# Patient Record
Sex: MALE | Race: WHITE | NOT HISPANIC OR LATINO | Employment: OTHER | URBAN - METROPOLITAN AREA
[De-identification: names, ages, dates, MRNs, and addresses within clinical notes are randomized per-mention and may not be internally consistent; named-entity substitution may affect disease eponyms.]

---

## 2022-09-19 ENCOUNTER — TELEPHONE (OUTPATIENT)
Dept: HEMATOLOGY/ONCOLOGY | Facility: CLINIC | Age: 62
End: 2022-09-19
Payer: COMMERCIAL

## 2022-10-12 ENCOUNTER — TELEPHONE (OUTPATIENT)
Dept: HEMATOLOGY/ONCOLOGY | Facility: CLINIC | Age: 62
End: 2022-10-12
Payer: COMMERCIAL

## 2022-10-12 NOTE — TELEPHONE ENCOUNTER
"----- Message from Toby Benítez sent at 10/12/2022 10:38 AM CDT -----  Scheduling Request        Patient Status: Np      Scheduling Appt: NFL, Injection (Velcaid)      Time/Date Preference: 10/19      Contact Preference?: [phone] (Mobile)      Treating Provider: Carmel      Do you feel you need to be seen today? No      Additional Notes:  "Thank you for all that you do for our patients"     "

## 2022-10-17 DIAGNOSIS — C90.01 MULTIPLE MYELOMA IN REMISSION: Primary | ICD-10-CM

## 2022-10-18 NOTE — PROGRESS NOTES
Chief Complaint : Multiple myeloma, hematology consultation      History of Present Illness : Vicente Connors is a 62 y.o. male here for hematology consultation. He has recently moved to [place] from [place]. He has history of stage IIIA, International Staging System stage II IgA kappa multiple myeloma, which is likely intermediate-risk based upon the presence of t(4;14) with hyperdiploidy, based on records available through care everywhere.    IgA Kappa multiple Myeloma was diagnosed in 2014. Started on Velcade, Revlimid and Dexamethasone at diagnosis. Of note, he developed a popliteal DVT and was started on Lovenox and later switched Xarelto. Completed a stem cell transplant with Dr Schuster in May 2015. He is s/p  tandem autologous transplant in 2015 and has been on triple maintenance with bortezomib, lenalidomide and dexamethasone since then.   He also has peripheral neuropathy, h/o LE DVT on chronic anti-coagulation, GERD, vertebral fracture. He has history of DVT and remains on chronic anticoagulation.  He has had multiple skeletal complications since his original diagnosis.    Medical history:    1. Multiple myeloma    2.  Vitamin B12 deficiency   3. Vertebral fracture   4. Peripheral neuropathy   5. Xerostomia   6. Posterior subcapsular polar age-related cataract, bilateral   7. Posterior vitreous detachment   8. Immunoglobulin deficiency   9. DVT, bilateral lower limbs  10. GERD without esophagitis     Surgereis:    1. Bone marrow biopsy  2. Colonoscopy      Social History     Socioeconomic History    Marital status:        No family history on file.      Review of patient's allergies indicates:  No Known Allergies        Current Outpatient Medications   Medication Sig    acyclovir (ZOVIRAX) 400 MG tablet Take 1 tablet by mouth once daily.    bortezomib (VELCADE INJ) Inject as directed. Pt gets every month    calcium carb/vit D3/minerals (CALCIUM-VITAMIN D ORAL)     dexAMETHasone (DECADRON) 4 MG Tab TAKE  10 TABLETS (40 MG DOSE) BY MOUTH DAY OF AND DAY AFTER VELCADE.    folic acid-vit B6-vit B12 2.5-25-2 mg (FOLBIC) 2.5-25-2 mg Tab Take 1 tablet by mouth once daily.    ibuprofen (ADVIL,MOTRIN) 600 MG tablet PRN    Lactobacillus acidophilus (PROBIOTIC ORAL) Take by mouth. 24 billion CFU    lenalidomide 10 mg Cap TAKE 1 CAPSULE DAILY FOR 21 DAYS, FOLLOWED BY 7 DAYS OFF  Indications: auth [id number]    magnesium 250 mg Tab     multivitamin with minerals tablet Take 1 tablet by mouth once daily.    omeprazole (PRILOSEC OTC) 20 MG tablet Take 1 tablet by mouth once daily.    rivaroxaban (XARELTO) 15 mg Tab Take 1 tablet by mouth once daily.    sulfamethoxazole-trimethoprim 800-160mg (BACTRIM DS) 800-160 mg Tab TAKE 1 TABLET EVERY MONDAY, WEDNESDAY AND FRIDAY    UNABLE TO FIND medication name: Benefiber Prebiotic    zoledronic acid (ZOMETA IV) Inject into the vein. Pt gets every 3 months     No current facility-administered medications for this visit.      Review of Systems   Constitutional:  Positive for malaise/fatigue. Negative for chills, fever and weight loss.   HENT:  Negative for congestion, ear pain and sinus pain.    Eyes:  Negative for double vision, photophobia and pain.   Respiratory:  Negative for sputum production and stridor.    Cardiovascular:  Negative for chest pain, palpitations, orthopnea and claudication.   Gastrointestinal:  Negative for blood in stool, constipation, diarrhea, melena and nausea.   Genitourinary:  Negative for dysuria, frequency and urgency.   Musculoskeletal:  Negative for myalgias and neck pain.   Neurological:  Positive for tingling. Negative for dizziness, tremors and speech change.   Endo/Heme/Allergies:  Negative for environmental allergies. Does not bruise/bleed easily.   Psychiatric/Behavioral:  Negative for substance abuse and suicidal ideas.           Vitals:    10/19/22 0937   BP: (!) 143/79   Pulse: 72   Resp: 16   Temp: 98 °F (36.7 °C)       PS: ECOG 1    Physical Exam  HENT:       Head: Normocephalic and atraumatic.      Mouth/Throat:      Pharynx: No posterior oropharyngeal erythema.   Eyes:      General: No scleral icterus.  Cardiovascular:      Rate and Rhythm: Normal rate and regular rhythm.   Pulmonary:      Effort: Pulmonary effort is normal. No respiratory distress.      Breath sounds: Normal breath sounds.   Abdominal:      General: There is no distension.      Palpations: There is no mass.      Tenderness: There is no abdominal tenderness.   Musculoskeletal:         General: No swelling.   Lymphadenopathy:      Cervical: No cervical adenopathy.   Neurological:      General: No focal deficit present.      Mental Status: He is alert and oriented to person, place, and time.      Cranial Nerves: No cranial nerve deficit.      1/9/2021 MRI Pelvis    IMPRESSION:   DIFFUSE PELVIC, PROXIMAL FEMORAL AND LOWER LUMBAR HETEROGENEOUS MARROW SIGNAL ABNORMALITY COULD RELATE TO HEMATOPOIETIC RED MARROW RECONVERSION. INFILTRATIVE MYELOMA CANNOT BE EXCLUDED. THERE IS A MORE DISCRETE 1.2 CM ENHANCING LESION IN THE LEFT FEMORAL HEAD THAT COULD ALSO REPRESENT AN ISLAND OF HEMATOPOIETIC RED MARROW. HOWEVER, CONTINUED ATTENTION ON FOLLOW-UP IS RECOMMENDED.    1/9/2021 MRI Lumbar spine/THoracic/Cervical  IMPRESSION:     1. There is interval increase in T2/STIR hyperintensity with enhancement extending from the left pedicle into the articular pillar and left transverse process of the T1 vertebra, although there is no definite decrease in the intrinsic T1 hypointense signal in this location. This may also be a degenerative process, although focal myeloma is not definitely excluded. Clinical correlation is recommended, and follow-up is suggested on future examinations.      2. There has been interval increase in STIR hyperintensity and enhancement along the posterior endplates at the T1-T2 level, most likely progressive degenerative endplate marrow signal changes.     3. No other focal suspicious STIR  hyperintense enhancing signal abnormalities are identified.     4. Stable multilevel compression fracture deformities      11/25/2020 Bone survey    IMPRESSION:   1. No lytic or blastic lesions are noted.      2. Stable compression fractures, as above.     3. Chronic changes, as above.       Assessment:    1. IgA kappa multiple myeloma in remission     -Vicente Connors is a 62 y.o.-year-old male with history of stage IIIA, International Staging System stage II IgA kappa multiple myeloma, which is likely intermediate-risk based upon the presence of t(4;14) with hyperdiploidy   -S/ p  tandem auto stem cell transplant in 2015 with a good biochemical remission  -Now on triple maintenance: velcade 1.3 mg/m2 sub q monthly, dex 40 mg PO day of and day after monthly velcade , revlimid 10 mg  PO for 21 days on/ 7 days off every 28 days  --Repeat 24 hour urine completed 4/2022  --He has been getting MRI spine every 2 years, next due Jan 2023    2. Low Back Pain: chronic and unchanged; not on any analgesic at this time    --Spine MRI done 1/21/21    3. History of LE DVT:     --Continue Xarelto   --Take with largest meal of the day  --Notify office of any scheduled procedure/surgery as Xarelto will need to be interrupted   -no bleeding diathesis reported    4. Infectious disease      --Continue prophylactic bactrim and acyclovir   --Tested for measles and ok no booster recommended  --Additional tdep recommended,  last in 2011  --completed Hep B series  --recommend annual flu vaccine, COVID 19 booster      5. Bone health:     --MRI spine1/21 neg   --Repeat 2023   --Last Zometa given 7/8/22, q3 months    6. Health Maintenance:     --Colonoscopy 2011 and 2017, repeat 2022   --PSA  up to date   --covid vaccine 3/31 pfizer booster done  --Got second booster 2/17/22          BMT Chart Routing      Follow up with physician . F/u before NEXT velcade treatmernt ( he can recieve velcad ein 1-2 weeks, when scheduled; next treatm\,ent  will be 4 weeks miko with clinic follow up on day of )   Follow up with SIGRID    Provider visit type    Infusion scheduling note zometa in 1-2 weeks ( when he comes for velcade) and then every 3 months   Injection scheduling note velcade once MONTHLY , to start in 1-2 weeks   Labs CBC, CMP, immunoglobulins, SPEP, beta 2 microglobulin, immunofixation and free light chains   Lab interval:  labs today; then cbc, cmp, spep, light chains on day of NEXT velcade treatment in 6 weeks   Imaging    Pharmacy appointment    Other referrals

## 2022-10-19 ENCOUNTER — LAB VISIT (OUTPATIENT)
Dept: LAB | Facility: HOSPITAL | Age: 62
End: 2022-10-19
Attending: INTERNAL MEDICINE
Payer: COMMERCIAL

## 2022-10-19 ENCOUNTER — OFFICE VISIT (OUTPATIENT)
Dept: HEMATOLOGY/ONCOLOGY | Facility: CLINIC | Age: 62
End: 2022-10-19
Payer: COMMERCIAL

## 2022-10-19 VITALS
TEMPERATURE: 98 F | SYSTOLIC BLOOD PRESSURE: 143 MMHG | HEART RATE: 72 BPM | DIASTOLIC BLOOD PRESSURE: 79 MMHG | WEIGHT: 175.63 LBS | RESPIRATION RATE: 16 BRPM | OXYGEN SATURATION: 98 % | HEIGHT: 69 IN | BODY MASS INDEX: 26.01 KG/M2

## 2022-10-19 DIAGNOSIS — G62.9 PERIPHERAL POLYNEUROPATHY: ICD-10-CM

## 2022-10-19 DIAGNOSIS — C90.01 MULTIPLE MYELOMA IN REMISSION: ICD-10-CM

## 2022-10-19 DIAGNOSIS — Z86.718 HISTORY OF DVT (DEEP VEIN THROMBOSIS): ICD-10-CM

## 2022-10-19 DIAGNOSIS — G89.3 CANCER ASSOCIATED PAIN: ICD-10-CM

## 2022-10-19 LAB
ALBUMIN SERPL BCP-MCNC: 3.5 G/DL (ref 3.5–5.2)
ALP SERPL-CCNC: 61 U/L (ref 55–135)
ALT SERPL W/O P-5'-P-CCNC: 24 U/L (ref 10–44)
ANION GAP SERPL CALC-SCNC: 5 MMOL/L (ref 8–16)
AST SERPL-CCNC: 25 U/L (ref 10–40)
B2 MICROGLOB SERPL-MCNC: 1.5 UG/ML (ref 0–2.5)
BASOPHILS # BLD AUTO: 0.03 K/UL (ref 0–0.2)
BASOPHILS NFR BLD: 1 % (ref 0–1.9)
BILIRUB SERPL-MCNC: 1.7 MG/DL (ref 0.1–1)
BUN SERPL-MCNC: 12 MG/DL (ref 8–23)
CALCIUM SERPL-MCNC: 9 MG/DL (ref 8.7–10.5)
CHLORIDE SERPL-SCNC: 104 MMOL/L (ref 95–110)
CO2 SERPL-SCNC: 27 MMOL/L (ref 23–29)
CREAT SERPL-MCNC: 0.8 MG/DL (ref 0.5–1.4)
DIFFERENTIAL METHOD: ABNORMAL
EOSINOPHIL # BLD AUTO: 0 K/UL (ref 0–0.5)
EOSINOPHIL NFR BLD: 0.3 % (ref 0–8)
ERYTHROCYTE [DISTWIDTH] IN BLOOD BY AUTOMATED COUNT: 15.4 % (ref 11.5–14.5)
EST. GFR  (NO RACE VARIABLE): >60 ML/MIN/1.73 M^2
GLUCOSE SERPL-MCNC: 91 MG/DL (ref 70–110)
HCT VFR BLD AUTO: 40.2 % (ref 40–54)
HGB BLD-MCNC: 13.4 G/DL (ref 14–18)
IGA SERPL-MCNC: 235 MG/DL (ref 40–350)
IGG SERPL-MCNC: 730 MG/DL (ref 650–1600)
IGM SERPL-MCNC: 16 MG/DL (ref 50–300)
IMM GRANULOCYTES # BLD AUTO: 0.01 K/UL (ref 0–0.04)
IMM GRANULOCYTES NFR BLD AUTO: 0.3 % (ref 0–0.5)
LYMPHOCYTES # BLD AUTO: 1.1 K/UL (ref 1–4.8)
LYMPHOCYTES NFR BLD: 35.6 % (ref 18–48)
MCH RBC QN AUTO: 31.9 PG (ref 27–31)
MCHC RBC AUTO-ENTMCNC: 33.3 G/DL (ref 32–36)
MCV RBC AUTO: 96 FL (ref 82–98)
MONOCYTES # BLD AUTO: 0.4 K/UL (ref 0.3–1)
MONOCYTES NFR BLD: 12.3 % (ref 4–15)
NEUTROPHILS # BLD AUTO: 1.6 K/UL (ref 1.8–7.7)
NEUTROPHILS NFR BLD: 50.5 % (ref 38–73)
NRBC BLD-RTO: 0 /100 WBC
PLATELET # BLD AUTO: 154 K/UL (ref 150–450)
PMV BLD AUTO: 9.8 FL (ref 9.2–12.9)
POTASSIUM SERPL-SCNC: 4.2 MMOL/L (ref 3.5–5.1)
PROT SERPL-MCNC: 6.4 G/DL (ref 6–8.4)
RBC # BLD AUTO: 4.2 M/UL (ref 4.6–6.2)
SODIUM SERPL-SCNC: 136 MMOL/L (ref 136–145)
WBC # BLD AUTO: 3.09 K/UL (ref 3.9–12.7)

## 2022-10-19 PROCEDURE — 80053 COMPREHEN METABOLIC PANEL: CPT | Performed by: INTERNAL MEDICINE

## 2022-10-19 PROCEDURE — 1159F MED LIST DOCD IN RCRD: CPT | Mod: CPTII,S$GLB,, | Performed by: INTERNAL MEDICINE

## 2022-10-19 PROCEDURE — 83520 IMMUNOASSAY QUANT NOS NONAB: CPT | Mod: 59 | Performed by: INTERNAL MEDICINE

## 2022-10-19 PROCEDURE — 3078F DIAST BP <80 MM HG: CPT | Mod: CPTII,S$GLB,, | Performed by: INTERNAL MEDICINE

## 2022-10-19 PROCEDURE — 99205 PR OFFICE/OUTPT VISIT, NEW, LEVL V, 60-74 MIN: ICD-10-PCS | Mod: S$GLB,,, | Performed by: INTERNAL MEDICINE

## 2022-10-19 PROCEDURE — 99999 PR PBB SHADOW E&M-EST. PATIENT-LVL IV: ICD-10-PCS | Mod: PBBFAC,,, | Performed by: INTERNAL MEDICINE

## 2022-10-19 PROCEDURE — 86334 IMMUNOFIX E-PHORESIS SERUM: CPT | Performed by: INTERNAL MEDICINE

## 2022-10-19 PROCEDURE — 85025 COMPLETE CBC W/AUTO DIFF WBC: CPT | Performed by: INTERNAL MEDICINE

## 2022-10-19 PROCEDURE — 99999 PR PBB SHADOW E&M-EST. PATIENT-LVL IV: CPT | Mod: PBBFAC,,, | Performed by: INTERNAL MEDICINE

## 2022-10-19 PROCEDURE — 82784 ASSAY IGA/IGD/IGG/IGM EACH: CPT | Performed by: INTERNAL MEDICINE

## 2022-10-19 PROCEDURE — 84165 PROTEIN E-PHORESIS SERUM: CPT | Performed by: INTERNAL MEDICINE

## 2022-10-19 PROCEDURE — 3077F SYST BP >= 140 MM HG: CPT | Mod: CPTII,S$GLB,, | Performed by: INTERNAL MEDICINE

## 2022-10-19 PROCEDURE — 82232 ASSAY OF BETA-2 PROTEIN: CPT | Performed by: INTERNAL MEDICINE

## 2022-10-19 PROCEDURE — 84165 PROTEIN E-PHORESIS SERUM: CPT | Mod: 26,,, | Performed by: PATHOLOGY

## 2022-10-19 PROCEDURE — 36415 COLL VENOUS BLD VENIPUNCTURE: CPT | Performed by: INTERNAL MEDICINE

## 2022-10-19 PROCEDURE — 1159F PR MEDICATION LIST DOCUMENTED IN MEDICAL RECORD: ICD-10-PCS | Mod: CPTII,S$GLB,, | Performed by: INTERNAL MEDICINE

## 2022-10-19 PROCEDURE — 86334 PATHOLOGIST INTERPRETATION IFE: ICD-10-PCS | Mod: 26,,, | Performed by: PATHOLOGY

## 2022-10-19 PROCEDURE — 86334 IMMUNOFIX E-PHORESIS SERUM: CPT | Mod: 26,,, | Performed by: PATHOLOGY

## 2022-10-19 PROCEDURE — 84165 PATHOLOGIST INTERPRETATION SPE: ICD-10-PCS | Mod: 26,,, | Performed by: PATHOLOGY

## 2022-10-19 PROCEDURE — 3078F PR MOST RECENT DIASTOLIC BLOOD PRESSURE < 80 MM HG: ICD-10-PCS | Mod: CPTII,S$GLB,, | Performed by: INTERNAL MEDICINE

## 2022-10-19 PROCEDURE — 3077F PR MOST RECENT SYSTOLIC BLOOD PRESSURE >= 140 MM HG: ICD-10-PCS | Mod: CPTII,S$GLB,, | Performed by: INTERNAL MEDICINE

## 2022-10-19 PROCEDURE — 99205 OFFICE O/P NEW HI 60 MIN: CPT | Mod: S$GLB,,, | Performed by: INTERNAL MEDICINE

## 2022-10-19 RX ORDER — SULFAMETHOXAZOLE AND TRIMETHOPRIM 800; 160 MG/1; MG/1
TABLET ORAL
COMMUNITY
Start: 2022-07-22 | End: 2023-06-29 | Stop reason: SDUPTHER

## 2022-10-19 RX ORDER — IBUPROFEN 600 MG/1
TABLET ORAL
COMMUNITY
Start: 2021-11-12 | End: 2023-06-02

## 2022-10-19 RX ORDER — OMEPRAZOLE 20 MG/1
1 TABLET, DELAYED RELEASE ORAL DAILY
COMMUNITY
End: 2023-03-13 | Stop reason: SDUPTHER

## 2022-10-19 RX ORDER — FOLIC ACID-PYRIDOXINE-CYANOCOBALAMIN TAB 2.5-25-2 MG 2.5-25-2 MG
1 TAB ORAL DAILY
COMMUNITY
Start: 2015-06-01 | End: 2023-02-02 | Stop reason: SDUPTHER

## 2022-10-19 RX ORDER — ACYCLOVIR 400 MG/1
1 TABLET ORAL DAILY
COMMUNITY
Start: 2015-06-12 | End: 2023-02-02 | Stop reason: SDUPTHER

## 2022-10-19 RX ORDER — LENALIDOMIDE 10 MG/1
CAPSULE ORAL
COMMUNITY
Start: 2015-06-01 | End: 2022-11-10 | Stop reason: SDUPTHER

## 2022-10-19 RX ORDER — MAGNESIUM 250 MG
TABLET ORAL
COMMUNITY
Start: 2015-06-01 | End: 2023-03-13 | Stop reason: SDUPTHER

## 2022-10-19 RX ORDER — DEXAMETHASONE 4 MG/1
TABLET ORAL
COMMUNITY
Start: 2015-06-12 | End: 2023-02-02 | Stop reason: SDUPTHER

## 2022-10-19 NOTE — PLAN OF CARE
START ON PATHWAY REGIMEN - Multiple Myeloma and Other Plasma Cell Dyscrasias    [id number]        Dexamethasone       Lenalidomide (Revlimid)       Bortezomib (Velcade)       Lenalidomide (Revlimid)           Additional Orders: In the Shay et al. study, maintenance therapy with   bortezomib + lenalidomide + dexamethasone was given for up to 3 years, followed   by lenalidomide monotherapy. Thromboprophylaxis recommended with lenalidomide.   Consider antiviral prophylaxis with bortezomib. See PI for details.    **Always confirm dose/schedule in your pharmacy ordering system**    Patient Characteristics:  Multiple Myeloma, Post-Transplant Maintenance Therapy, High Risk  Disease Classification: Multiple Myeloma  R-ISS Staging: II  Therapeutic Status: Post-transplant Maintenance Therapy  Risk Status: High Risk  Intent of Therapy:  Non-Curative / Palliative Intent, Discussed with Patient

## 2022-10-20 LAB
ALBUMIN SERPL ELPH-MCNC: 3.57 G/DL (ref 3.35–5.55)
ALPHA1 GLOB SERPL ELPH-MCNC: 0.28 G/DL (ref 0.17–0.41)
ALPHA2 GLOB SERPL ELPH-MCNC: 0.67 G/DL (ref 0.43–0.99)
B-GLOBULIN SERPL ELPH-MCNC: 0.79 G/DL (ref 0.5–1.1)
GAMMA GLOB SERPL ELPH-MCNC: 0.7 G/DL (ref 0.67–1.58)
INTERPRETATION SERPL IFE-IMP: NORMAL
KAPPA LC SER QL IA: 1.33 MG/DL (ref 0.33–1.94)
KAPPA LC/LAMBDA SER IA: 1.29 (ref 0.26–1.65)
LAMBDA LC SER QL IA: 1.03 MG/DL (ref 0.57–2.63)
PROT SERPL-MCNC: 6 G/DL (ref 6–8.4)

## 2022-10-21 ENCOUNTER — TELEPHONE (OUTPATIENT)
Dept: HEMATOLOGY/ONCOLOGY | Facility: CLINIC | Age: 62
End: 2022-10-21
Payer: COMMERCIAL

## 2022-10-24 LAB
PATHOLOGIST INTERPRETATION IFE: NORMAL
PATHOLOGIST INTERPRETATION SPE: NORMAL

## 2022-10-25 ENCOUNTER — LAB VISIT (OUTPATIENT)
Dept: LAB | Facility: HOSPITAL | Age: 62
End: 2022-10-25
Attending: INTERNAL MEDICINE
Payer: COMMERCIAL

## 2022-10-25 DIAGNOSIS — C90.01 MULTIPLE MYELOMA IN REMISSION: ICD-10-CM

## 2022-10-25 PROCEDURE — 88321 CONSLTJ&REPRT SLD PREP ELSWR: CPT | Performed by: PATHOLOGY

## 2022-10-25 PROCEDURE — 88325 CONSLTJ COMPRE RVW REC REPRT: CPT | Performed by: PATHOLOGY

## 2022-10-25 PROCEDURE — 88325 CONSLTJ COMPRE RVW REC REPRT: CPT | Mod: ,,, | Performed by: PATHOLOGY

## 2022-10-25 PROCEDURE — 88325 PR  COMPREHENSIVE REVIEW OF DATA: ICD-10-PCS | Mod: ,,, | Performed by: PATHOLOGY

## 2022-10-31 ENCOUNTER — PATIENT MESSAGE (OUTPATIENT)
Dept: HEMATOLOGY/ONCOLOGY | Facility: CLINIC | Age: 62
End: 2022-10-31
Payer: COMMERCIAL

## 2022-10-31 ENCOUNTER — TELEPHONE (OUTPATIENT)
Dept: HEMATOLOGY/ONCOLOGY | Facility: CLINIC | Age: 62
End: 2022-10-31
Payer: COMMERCIAL

## 2022-10-31 NOTE — TELEPHONE ENCOUNTER
----- Message from Angie Jolly sent at 10/31/2022  9:07 AM CDT -----  Regarding: cancel  Contact: [phone]  Cancel Request    Date of Appt: 10/31  Contact #:[phone]  Additional info: Pt states he was not aware of the appt

## 2022-11-01 DIAGNOSIS — C90.01 MULTIPLE MYELOMA IN REMISSION: Primary | ICD-10-CM

## 2022-11-03 ENCOUNTER — INFUSION (OUTPATIENT)
Dept: INFUSION THERAPY | Facility: OTHER | Age: 62
End: 2022-11-03
Attending: OBSTETRICS & GYNECOLOGY
Payer: COMMERCIAL

## 2022-11-03 ENCOUNTER — LAB VISIT (OUTPATIENT)
Dept: LAB | Facility: OTHER | Age: 62
End: 2022-11-03
Attending: INTERNAL MEDICINE
Payer: COMMERCIAL

## 2022-11-03 VITALS
HEART RATE: 70 BPM | WEIGHT: 180.75 LBS | DIASTOLIC BLOOD PRESSURE: 74 MMHG | OXYGEN SATURATION: 98 % | RESPIRATION RATE: 16 BRPM | HEIGHT: 68 IN | SYSTOLIC BLOOD PRESSURE: 132 MMHG | TEMPERATURE: 98 F | BODY MASS INDEX: 27.39 KG/M2

## 2022-11-03 DIAGNOSIS — C90.01 MULTIPLE MYELOMA IN REMISSION: ICD-10-CM

## 2022-11-03 DIAGNOSIS — C90.01 MULTIPLE MYELOMA IN REMISSION: Primary | ICD-10-CM

## 2022-11-03 LAB
ALBUMIN SERPL BCP-MCNC: 3.3 G/DL (ref 3.5–5.2)
ALP SERPL-CCNC: 58 U/L (ref 55–135)
ALT SERPL W/O P-5'-P-CCNC: 30 U/L (ref 10–44)
ANION GAP SERPL CALC-SCNC: 6 MMOL/L (ref 8–16)
AST SERPL-CCNC: 28 U/L (ref 10–40)
BASOPHILS # BLD AUTO: 0.01 K/UL (ref 0–0.2)
BASOPHILS NFR BLD: 0.4 % (ref 0–1.9)
BILIRUB SERPL-MCNC: 1.5 MG/DL (ref 0.1–1)
BUN SERPL-MCNC: 13 MG/DL (ref 8–23)
CALCIUM SERPL-MCNC: 8.7 MG/DL (ref 8.7–10.5)
CHLORIDE SERPL-SCNC: 109 MMOL/L (ref 95–110)
CO2 SERPL-SCNC: 24 MMOL/L (ref 23–29)
CREAT SERPL-MCNC: 1.1 MG/DL (ref 0.5–1.4)
DIFFERENTIAL METHOD: ABNORMAL
EOSINOPHIL # BLD AUTO: 0 K/UL (ref 0–0.5)
EOSINOPHIL NFR BLD: 1.4 % (ref 0–8)
ERYTHROCYTE [DISTWIDTH] IN BLOOD BY AUTOMATED COUNT: 15.8 % (ref 11.5–14.5)
EST. GFR  (NO RACE VARIABLE): >60 ML/MIN/1.73 M^2
GLUCOSE SERPL-MCNC: 108 MG/DL (ref 70–110)
HCT VFR BLD AUTO: 39.1 % (ref 40–54)
HGB BLD-MCNC: 12.8 G/DL (ref 14–18)
IMM GRANULOCYTES # BLD AUTO: 0 K/UL (ref 0–0.04)
IMM GRANULOCYTES NFR BLD AUTO: 0 % (ref 0–0.5)
LYMPHOCYTES # BLD AUTO: 1 K/UL (ref 1–4.8)
LYMPHOCYTES NFR BLD: 37 % (ref 18–48)
MCH RBC QN AUTO: 32.2 PG (ref 27–31)
MCHC RBC AUTO-ENTMCNC: 32.7 G/DL (ref 32–36)
MCV RBC AUTO: 98 FL (ref 82–98)
MONOCYTES # BLD AUTO: 0.5 K/UL (ref 0.3–1)
MONOCYTES NFR BLD: 16.4 % (ref 4–15)
NEUTROPHILS # BLD AUTO: 1.3 K/UL (ref 1.8–7.7)
NEUTROPHILS NFR BLD: 44.8 % (ref 38–73)
NRBC BLD-RTO: 0 /100 WBC
PLATELET # BLD AUTO: 130 K/UL (ref 150–450)
PMV BLD AUTO: 9.7 FL (ref 9.2–12.9)
POTASSIUM SERPL-SCNC: 4.3 MMOL/L (ref 3.5–5.1)
PROT SERPL-MCNC: 6.1 G/DL (ref 6–8.4)
RBC # BLD AUTO: 3.98 M/UL (ref 4.6–6.2)
SODIUM SERPL-SCNC: 139 MMOL/L (ref 136–145)
WBC # BLD AUTO: 2.81 K/UL (ref 3.9–12.7)

## 2022-11-03 PROCEDURE — 85025 COMPLETE CBC W/AUTO DIFF WBC: CPT | Performed by: INTERNAL MEDICINE

## 2022-11-03 PROCEDURE — 96401 CHEMO ANTI-NEOPL SQ/IM: CPT

## 2022-11-03 PROCEDURE — 96365 THER/PROPH/DIAG IV INF INIT: CPT

## 2022-11-03 PROCEDURE — 80053 COMPREHEN METABOLIC PANEL: CPT | Performed by: INTERNAL MEDICINE

## 2022-11-03 PROCEDURE — 36415 COLL VENOUS BLD VENIPUNCTURE: CPT | Performed by: INTERNAL MEDICINE

## 2022-11-03 PROCEDURE — 25000003 PHARM REV CODE 250: Performed by: INTERNAL MEDICINE

## 2022-11-03 PROCEDURE — 63600175 PHARM REV CODE 636 W HCPCS: Mod: JG | Performed by: INTERNAL MEDICINE

## 2022-11-03 RX ORDER — SODIUM CHLORIDE 0.9 % (FLUSH) 0.9 %
10 SYRINGE (ML) INJECTION
Status: DISCONTINUED | OUTPATIENT
Start: 2022-11-03 | End: 2022-11-03 | Stop reason: HOSPADM

## 2022-11-03 RX ORDER — HEPARIN 100 UNIT/ML
500 SYRINGE INTRAVENOUS
Status: DISCONTINUED | OUTPATIENT
Start: 2022-11-03 | End: 2022-11-03 | Stop reason: HOSPADM

## 2022-11-03 RX ORDER — LENALIDOMIDE 10 MG/1
1 CAPSULE ORAL SEE ADMIN INSTRUCTIONS
Qty: 21 EACH | Refills: 0 | Status: SHIPPED | OUTPATIENT
Start: 2022-11-03 | End: 2022-11-05 | Stop reason: SDUPTHER

## 2022-11-03 RX ORDER — BORTEZOMIB 3.5 MG/1
1.3 INJECTION, POWDER, LYOPHILIZED, FOR SOLUTION INTRAVENOUS; SUBCUTANEOUS
Status: COMPLETED | OUTPATIENT
Start: 2022-11-03 | End: 2022-11-03

## 2022-11-03 RX ADMIN — BORTEZOMIB 2.6 MG: 3.5 INJECTION, POWDER, LYOPHILIZED, FOR SOLUTION INTRAVENOUS; SUBCUTANEOUS at 11:11

## 2022-11-03 RX ADMIN — SODIUM CHLORIDE: 0.9 INJECTION, SOLUTION INTRAVENOUS at 10:11

## 2022-11-03 RX ADMIN — ZOLEDRONIC ACID 4 MG: 4 INJECTION, SOLUTION, CONCENTRATE INTRAVENOUS at 10:11

## 2022-11-03 NOTE — PLAN OF CARE
Vital Signs Stable, No apparent distress noted; Pt tolerated _Zometa/Velcade  treatments w/o difficulty.  24g piv remvd;No bleeding,swelling or drainage noted;coban and gauze applied  Discharge instructions given;Pt verbalizes understanding. Voices no questions or concerns  Next appointment scheduled;ambulated from clinic in NAD

## 2022-11-04 ENCOUNTER — PATIENT MESSAGE (OUTPATIENT)
Dept: HEMATOLOGY/ONCOLOGY | Facility: CLINIC | Age: 62
End: 2022-11-04
Payer: COMMERCIAL

## 2022-11-04 LAB
COMMENT: NORMAL
FINAL PATHOLOGIC DIAGNOSIS: NORMAL
GROSS: NORMAL
Lab: NORMAL
MICROSCOPIC EXAM: NORMAL

## 2022-11-06 ENCOUNTER — PATIENT MESSAGE (OUTPATIENT)
Dept: HEMATOLOGY/ONCOLOGY | Facility: CLINIC | Age: 62
End: 2022-11-06
Payer: COMMERCIAL

## 2022-11-07 ENCOUNTER — TELEPHONE (OUTPATIENT)
Dept: HEMATOLOGY/ONCOLOGY | Facility: CLINIC | Age: 62
End: 2022-11-07
Payer: COMMERCIAL

## 2022-11-07 NOTE — TELEPHONE ENCOUNTER
"----- Message from Janae Jj sent at 11/7/2022  8:30 AM CST -----  Regarding: Speak with office  Contact: Vicente  Consult/Advisory:       Name Of Caller: Vicente      Contact Preference?: [phone]         Does patient feel the need to be seen today? No       What is the nature of the call?: Pt tested postive for Covid 19 and would like to know if he will need viral treatment.       Additional Notes:  "Thank you for all that you do for our patients'"      "

## 2022-11-08 ENCOUNTER — TELEPHONE (OUTPATIENT)
Dept: HEMATOLOGY/ONCOLOGY | Facility: CLINIC | Age: 62
End: 2022-11-08
Payer: COMMERCIAL

## 2022-11-08 NOTE — TELEPHONE ENCOUNTER
----- Message from Nakia Wilde sent at 11/8/2022  8:36 AM CST -----  Regarding: [employer] authorization  Reason for Call:    Pharmacy calling for assistance    Name of caller:Stephani    Pharmacy name and phone number: [facility]  [phone]   Ref  [id number]    Need celgene authorization for Revlamid 10 mg

## 2022-11-08 NOTE — TELEPHONE ENCOUNTER
Provided pharmacy with [employer] auth [id number]. Per Dr. Burt, pt may take Revlimid and Paxlovid simultaneously.

## 2022-11-10 ENCOUNTER — PATIENT MESSAGE (OUTPATIENT)
Dept: HEMATOLOGY/ONCOLOGY | Facility: CLINIC | Age: 62
End: 2022-11-10
Payer: COMMERCIAL

## 2022-11-10 DIAGNOSIS — C90.01 MULTIPLE MYELOMA IN REMISSION: ICD-10-CM

## 2022-11-10 DIAGNOSIS — C90.01 MULTIPLE MYELOMA IN REMISSION: Primary | ICD-10-CM

## 2022-11-10 RX ORDER — LENALIDOMIDE 10 MG/1
1 CAPSULE ORAL SEE ADMIN INSTRUCTIONS
Qty: 21 EACH | Refills: 0 | Status: SHIPPED | OUTPATIENT
Start: 2022-11-10 | End: 2022-12-05 | Stop reason: SDUPTHER

## 2022-11-10 RX ORDER — LENALIDOMIDE 10 MG/1
CAPSULE ORAL
Qty: 21 EACH | Refills: 0 | Status: SHIPPED | OUTPATIENT
Start: 2022-11-10 | End: 2023-01-10 | Stop reason: SDUPTHER

## 2022-11-10 NOTE — TELEPHONE ENCOUNTER
"----- Message from Aurora Morrell sent at 11/10/2022 11:02 AM CST -----  Regarding: New Prescription  Contact: Clementina roca/ [facility]  Clementina roca/ [facility] requesting a New Prescription For medication  ( Revlimid 10 mg ) , For Pt States That  Lost Medication, Please sent [employer] Autho Number as well    Pharmacy " [facility] [address] [place] [address] [place], [address]   ,,,... E Prescribing Address ,  [address] [place] [address], [address]     Phone [phone]    Reference #[id number]    "

## 2022-12-01 DIAGNOSIS — C90.01 MULTIPLE MYELOMA IN REMISSION: ICD-10-CM

## 2022-12-01 RX ORDER — LENALIDOMIDE 10 MG/1
1 CAPSULE ORAL SEE ADMIN INSTRUCTIONS
Qty: 21 EACH | Refills: 0 | Status: CANCELLED | OUTPATIENT
Start: 2022-12-01

## 2022-12-05 ENCOUNTER — OFFICE VISIT (OUTPATIENT)
Dept: HEMATOLOGY/ONCOLOGY | Facility: CLINIC | Age: 62
End: 2022-12-05
Payer: COMMERCIAL

## 2022-12-05 ENCOUNTER — LAB VISIT (OUTPATIENT)
Dept: LAB | Facility: HOSPITAL | Age: 62
End: 2022-12-05
Attending: INTERNAL MEDICINE
Payer: COMMERCIAL

## 2022-12-05 ENCOUNTER — INFUSION (OUTPATIENT)
Dept: INFUSION THERAPY | Facility: HOSPITAL | Age: 62
End: 2022-12-05
Attending: INTERNAL MEDICINE
Payer: COMMERCIAL

## 2022-12-05 VITALS
SYSTOLIC BLOOD PRESSURE: 147 MMHG | RESPIRATION RATE: 18 BRPM | HEIGHT: 68 IN | DIASTOLIC BLOOD PRESSURE: 80 MMHG | BODY MASS INDEX: 27.78 KG/M2 | HEART RATE: 65 BPM | WEIGHT: 183.31 LBS

## 2022-12-05 VITALS
TEMPERATURE: 98 F | HEART RATE: 69 BPM | HEIGHT: 68 IN | BODY MASS INDEX: 27.78 KG/M2 | WEIGHT: 183.31 LBS | RESPIRATION RATE: 17 BRPM | OXYGEN SATURATION: 97 % | DIASTOLIC BLOOD PRESSURE: 75 MMHG | SYSTOLIC BLOOD PRESSURE: 159 MMHG

## 2022-12-05 DIAGNOSIS — D61.818 PANCYTOPENIA: ICD-10-CM

## 2022-12-05 DIAGNOSIS — Z79.01 CURRENT USE OF LONG TERM ANTICOAGULATION: ICD-10-CM

## 2022-12-05 DIAGNOSIS — G62.9 PERIPHERAL POLYNEUROPATHY: ICD-10-CM

## 2022-12-05 DIAGNOSIS — C90.01 MULTIPLE MYELOMA IN REMISSION: ICD-10-CM

## 2022-12-05 DIAGNOSIS — C90.01 MULTIPLE MYELOMA IN REMISSION: Primary | ICD-10-CM

## 2022-12-05 DIAGNOSIS — Z94.81 AUTOLOGOUS BONE MARROW TRANSPLANTATION STATUS: ICD-10-CM

## 2022-12-05 DIAGNOSIS — Z86.718 HISTORY OF DVT (DEEP VEIN THROMBOSIS): ICD-10-CM

## 2022-12-05 LAB
ALBUMIN SERPL BCP-MCNC: 3.4 G/DL (ref 3.5–5.2)
ALP SERPL-CCNC: 64 U/L (ref 55–135)
ALT SERPL W/O P-5'-P-CCNC: 28 U/L (ref 10–44)
ANION GAP SERPL CALC-SCNC: 8 MMOL/L (ref 8–16)
ANISOCYTOSIS BLD QL SMEAR: SLIGHT
AST SERPL-CCNC: 29 U/L (ref 10–40)
B2 MICROGLOB SERPL-MCNC: 1.7 UG/ML (ref 0–2.5)
BASOPHILS # BLD AUTO: 0.02 K/UL (ref 0–0.2)
BASOPHILS NFR BLD: 0.8 % (ref 0–1.9)
BILIRUB SERPL-MCNC: 1.5 MG/DL (ref 0.1–1)
BUN SERPL-MCNC: 13 MG/DL (ref 8–23)
CALCIUM SERPL-MCNC: 8.5 MG/DL (ref 8.7–10.5)
CHLORIDE SERPL-SCNC: 109 MMOL/L (ref 95–110)
CO2 SERPL-SCNC: 24 MMOL/L (ref 23–29)
CREAT SERPL-MCNC: 0.8 MG/DL (ref 0.5–1.4)
DIFFERENTIAL METHOD: ABNORMAL
EOSINOPHIL # BLD AUTO: 0.1 K/UL (ref 0–0.5)
EOSINOPHIL NFR BLD: 2.4 % (ref 0–8)
ERYTHROCYTE [DISTWIDTH] IN BLOOD BY AUTOMATED COUNT: 15.4 % (ref 11.5–14.5)
EST. GFR  (NO RACE VARIABLE): >60 ML/MIN/1.73 M^2
GLUCOSE SERPL-MCNC: 100 MG/DL (ref 70–110)
HCT VFR BLD AUTO: 37.2 % (ref 40–54)
HGB BLD-MCNC: 12 G/DL (ref 14–18)
HYPOCHROMIA BLD QL SMEAR: ABNORMAL
IGA SERPL-MCNC: 190 MG/DL (ref 40–350)
IGG SERPL-MCNC: 749 MG/DL (ref 650–1600)
IGM SERPL-MCNC: 14 MG/DL (ref 50–300)
IMM GRANULOCYTES # BLD AUTO: 0 K/UL (ref 0–0.04)
IMM GRANULOCYTES NFR BLD AUTO: 0 % (ref 0–0.5)
LYMPHOCYTES # BLD AUTO: 1.1 K/UL (ref 1–4.8)
LYMPHOCYTES NFR BLD: 44.1 % (ref 18–48)
MCH RBC QN AUTO: 31.2 PG (ref 27–31)
MCHC RBC AUTO-ENTMCNC: 32.3 G/DL (ref 32–36)
MCV RBC AUTO: 97 FL (ref 82–98)
MONOCYTES # BLD AUTO: 0.4 K/UL (ref 0.3–1)
MONOCYTES NFR BLD: 18 % (ref 4–15)
NEUTROPHILS # BLD AUTO: 0.9 K/UL (ref 1.8–7.7)
NEUTROPHILS NFR BLD: 34.7 % (ref 38–73)
NRBC BLD-RTO: 0 /100 WBC
OVALOCYTES BLD QL SMEAR: ABNORMAL
PLATELET # BLD AUTO: 136 K/UL (ref 150–450)
PMV BLD AUTO: 10.1 FL (ref 9.2–12.9)
POIKILOCYTOSIS BLD QL SMEAR: SLIGHT
POLYCHROMASIA BLD QL SMEAR: ABNORMAL
POTASSIUM SERPL-SCNC: 4 MMOL/L (ref 3.5–5.1)
PROT SERPL-MCNC: 6.1 G/DL (ref 6–8.4)
RBC # BLD AUTO: 3.85 M/UL (ref 4.6–6.2)
SODIUM SERPL-SCNC: 141 MMOL/L (ref 136–145)
SPHEROCYTES BLD QL SMEAR: ABNORMAL
WBC # BLD AUTO: 2.45 K/UL (ref 3.9–12.7)

## 2022-12-05 PROCEDURE — 84165 PATHOLOGIST INTERPRETATION SPE: ICD-10-PCS | Mod: 26,,, | Performed by: PATHOLOGY

## 2022-12-05 PROCEDURE — 85025 COMPLETE CBC W/AUTO DIFF WBC: CPT | Performed by: INTERNAL MEDICINE

## 2022-12-05 PROCEDURE — 82232 ASSAY OF BETA-2 PROTEIN: CPT | Performed by: INTERNAL MEDICINE

## 2022-12-05 PROCEDURE — 3008F BODY MASS INDEX DOCD: CPT | Mod: CPTII,S$GLB,, | Performed by: INTERNAL MEDICINE

## 2022-12-05 PROCEDURE — 96401 CHEMO ANTI-NEOPL SQ/IM: CPT

## 2022-12-05 PROCEDURE — 99999 PR PBB SHADOW E&M-EST. PATIENT-LVL III: CPT | Mod: PBBFAC,,, | Performed by: INTERNAL MEDICINE

## 2022-12-05 PROCEDURE — 80053 COMPREHEN METABOLIC PANEL: CPT | Performed by: INTERNAL MEDICINE

## 2022-12-05 PROCEDURE — 86334 PATHOLOGIST INTERPRETATION IFE: ICD-10-PCS | Mod: 26,,, | Performed by: PATHOLOGY

## 2022-12-05 PROCEDURE — 86334 IMMUNOFIX E-PHORESIS SERUM: CPT | Mod: 26,,, | Performed by: PATHOLOGY

## 2022-12-05 PROCEDURE — 3077F SYST BP >= 140 MM HG: CPT | Mod: CPTII,S$GLB,, | Performed by: INTERNAL MEDICINE

## 2022-12-05 PROCEDURE — 99215 PR OFFICE/OUTPT VISIT, EST, LEVL V, 40-54 MIN: ICD-10-PCS | Mod: S$GLB,,, | Performed by: INTERNAL MEDICINE

## 2022-12-05 PROCEDURE — 3008F PR BODY MASS INDEX (BMI) DOCUMENTED: ICD-10-PCS | Mod: CPTII,S$GLB,, | Performed by: INTERNAL MEDICINE

## 2022-12-05 PROCEDURE — 99999 PR PBB SHADOW E&M-EST. PATIENT-LVL III: ICD-10-PCS | Mod: PBBFAC,,, | Performed by: INTERNAL MEDICINE

## 2022-12-05 PROCEDURE — 63600175 PHARM REV CODE 636 W HCPCS: Mod: JG | Performed by: INTERNAL MEDICINE

## 2022-12-05 PROCEDURE — 86334 IMMUNOFIX E-PHORESIS SERUM: CPT | Performed by: INTERNAL MEDICINE

## 2022-12-05 PROCEDURE — 83521 IG LIGHT CHAINS FREE EACH: CPT | Performed by: INTERNAL MEDICINE

## 2022-12-05 PROCEDURE — 84165 PROTEIN E-PHORESIS SERUM: CPT | Mod: 26,,, | Performed by: PATHOLOGY

## 2022-12-05 PROCEDURE — 82784 ASSAY IGA/IGD/IGG/IGM EACH: CPT | Mod: 59 | Performed by: INTERNAL MEDICINE

## 2022-12-05 PROCEDURE — 84165 PROTEIN E-PHORESIS SERUM: CPT | Performed by: INTERNAL MEDICINE

## 2022-12-05 PROCEDURE — 3077F PR MOST RECENT SYSTOLIC BLOOD PRESSURE >= 140 MM HG: ICD-10-PCS | Mod: CPTII,S$GLB,, | Performed by: INTERNAL MEDICINE

## 2022-12-05 PROCEDURE — 99215 OFFICE O/P EST HI 40 MIN: CPT | Mod: S$GLB,,, | Performed by: INTERNAL MEDICINE

## 2022-12-05 PROCEDURE — 3078F PR MOST RECENT DIASTOLIC BLOOD PRESSURE < 80 MM HG: ICD-10-PCS | Mod: CPTII,S$GLB,, | Performed by: INTERNAL MEDICINE

## 2022-12-05 PROCEDURE — 3078F DIAST BP <80 MM HG: CPT | Mod: CPTII,S$GLB,, | Performed by: INTERNAL MEDICINE

## 2022-12-05 PROCEDURE — 36415 COLL VENOUS BLD VENIPUNCTURE: CPT | Performed by: INTERNAL MEDICINE

## 2022-12-05 RX ORDER — HEPARIN 100 UNIT/ML
500 SYRINGE INTRAVENOUS
Status: DISCONTINUED | OUTPATIENT
Start: 2022-12-05 | End: 2022-12-05 | Stop reason: HOSPADM

## 2022-12-05 RX ORDER — BORTEZOMIB 3.5 MG/1
1.3 INJECTION, POWDER, LYOPHILIZED, FOR SOLUTION INTRAVENOUS; SUBCUTANEOUS
Status: CANCELLED | OUTPATIENT
Start: 2022-12-05

## 2022-12-05 RX ORDER — HEPARIN 100 UNIT/ML
500 SYRINGE INTRAVENOUS
Status: CANCELLED | OUTPATIENT
Start: 2022-12-05

## 2022-12-05 RX ORDER — SODIUM CHLORIDE 0.9 % (FLUSH) 0.9 %
10 SYRINGE (ML) INJECTION
Status: CANCELLED | OUTPATIENT
Start: 2022-12-05

## 2022-12-05 RX ORDER — LENALIDOMIDE 10 MG/1
1 CAPSULE ORAL SEE ADMIN INSTRUCTIONS
Qty: 21 EACH | Refills: 0 | Status: SHIPPED | OUTPATIENT
Start: 2022-12-05 | End: 2023-02-02

## 2022-12-05 RX ORDER — BORTEZOMIB 3.5 MG/1
1.3 INJECTION, POWDER, LYOPHILIZED, FOR SOLUTION INTRAVENOUS; SUBCUTANEOUS
Status: COMPLETED | OUTPATIENT
Start: 2022-12-05 | End: 2022-12-05

## 2022-12-05 RX ORDER — LENALIDOMIDE 10 MG/1
1 CAPSULE ORAL SEE ADMIN INSTRUCTIONS
Qty: 21 EACH | Refills: 0 | Status: SHIPPED | OUTPATIENT
Start: 2022-12-05 | End: 2023-01-03 | Stop reason: SDUPTHER

## 2022-12-05 RX ORDER — SODIUM CHLORIDE 0.9 % (FLUSH) 0.9 %
10 SYRINGE (ML) INJECTION
Status: DISCONTINUED | OUTPATIENT
Start: 2022-12-05 | End: 2022-12-05 | Stop reason: HOSPADM

## 2022-12-05 RX ADMIN — BORTEZOMIB 2.6 MG: 3.5 INJECTION, POWDER, LYOPHILIZED, FOR SOLUTION INTRAVENOUS; SUBCUTANEOUS at 12:12

## 2022-12-05 NOTE — PROGRESS NOTES
Chief Complaint : Multiple myeloma, s/p tandem auto SCT, on VRd maintenance, hematology follow up      History of Present Illness : Vicente Connors is a 62 y.o. male here for hematology follow up. He has recently moved to [place] from [place]. He has history of stage IIIA, International Staging System stage II IgA kappa multiple myeloma, which is likely intermediate-risk based upon the presence of t(4;14) with hyperdiploidy, based on records available through care everywhere.    IgA Kappa multiple Myeloma was diagnosed in 2014.   He was started on velcade, revlimid and dexamethasone at diagnosis.   Of note, he developed a popliteal DVT and was started on Lovenox and later switched Xarelto.   He completed a stem cell transplant with Dr Schuster in May 2015. He is s/p  tandem autologous transplant in 2015 and has been on triple maintenance with bortezomib, lenalidomide and dexamethasone since then.   He also has peripheral neuropathy, h/o LE DVT on chronic anti-coagulation, GERD, vertebral fracture. He has history of DVT and remains on chronic anticoagulation.  He has had multiple skeletal complications since his original diagnosis.      Interval History: He is here for a follow up visit. He was treated with paxlovid. He is asymptomatic now.      Review of patient's allergies indicates:  No Known Allergies        Current Outpatient Medications   Medication Sig    acyclovir (ZOVIRAX) 400 MG tablet Take 1 tablet by mouth once daily.    bortezomib (VELCADE INJ) Inject as directed. Pt gets every month    calcium carb/vit D3/minerals (CALCIUM-VITAMIN D ORAL)     dexAMETHasone (DECADRON) 4 MG Tab TAKE 10 TABLETS (40 MG DOSE) BY MOUTH DAY OF AND DAY AFTER VELCADE.    folic acid-vit B6-vit B12 2.5-25-2 mg (FOLBIC) 2.5-25-2 mg Tab Take 1 tablet by mouth once daily.    ibuprofen (ADVIL,MOTRIN) 600 MG tablet PRN    Lactobacillus acidophilus (PROBIOTIC ORAL) Take by mouth. 24 billion CFU    lenalidomide 10 mg Cap Take 1 capsule by  mouth As instructed.    lenalidomide 10 mg Cap TAKE 1 CAPSULE DAILY FOR 21 DAYS, FOLLOWED BY 7 DAYS OFF  Indications: auth [id number]  Strength: 10 mg.    magnesium 250 mg Tab     multivitamin with minerals tablet Take 1 tablet by mouth once daily.    omeprazole (PRILOSEC OTC) 20 MG tablet Take 1 tablet by mouth once daily.    rivaroxaban (XARELTO) 15 mg Tab Take 1 tablet by mouth once daily.    sulfamethoxazole-trimethoprim 800-160mg (BACTRIM DS) 800-160 mg Tab TAKE 1 TABLET EVERY MONDAY, WEDNESDAY AND FRIDAY    UNABLE TO FIND medication name: Benefiber Prebiotic    zoledronic acid (ZOMETA IV) Inject into the vein. Pt gets every 3 months     No current facility-administered medications for this visit.      Review of Systems   Constitutional:  Positive for malaise/fatigue. Negative for chills, fever and weight loss.   HENT:  Negative for congestion, ear pain and sinus pain.    Eyes:  Negative for double vision, photophobia and pain.   Respiratory:  Negative for sputum production and stridor.    Cardiovascular:  Negative for chest pain, palpitations, orthopnea and claudication.   Gastrointestinal:  Negative for blood in stool, constipation, diarrhea, melena and nausea.   Genitourinary:  Negative for dysuria, frequency and urgency.   Musculoskeletal:  Negative for myalgias and neck pain.   Neurological:  Positive for tingling. Negative for dizziness, tremors and speech change.   Endo/Heme/Allergies:  Negative for environmental allergies. Does not bruise/bleed easily.   Psychiatric/Behavioral:  Negative for substance abuse and suicidal ideas.       Vitals:    12/05/22 1022   BP: (!) 159/75   Pulse: 69   Resp: 17   Temp: 97.7 °F (36.5 °C)       PS: ECOG 1    Physical Exam  HENT:      Head: Normocephalic and atraumatic.      Mouth/Throat:      Pharynx: No posterior oropharyngeal erythema.   Eyes:      General: No scleral icterus.  Cardiovascular:      Rate and Rhythm: Normal rate and regular rhythm.   Pulmonary:       Effort: Pulmonary effort is normal. No respiratory distress.      Breath sounds: Normal breath sounds.   Abdominal:      General: There is no distension.      Palpations: There is no mass.      Tenderness: There is no abdominal tenderness.   Musculoskeletal:         General: No swelling.   Lymphadenopathy:      Cervical: No cervical adenopathy.   Neurological:      General: No focal deficit present.      Mental Status: He is alert and oriented to person, place, and time.      Cranial Nerves: No cranial nerve deficit.      1/9/2021 MRI Pelvis    IMPRESSION:   DIFFUSE PELVIC, PROXIMAL FEMORAL AND LOWER LUMBAR HETEROGENEOUS MARROW SIGNAL ABNORMALITY COULD RELATE TO HEMATOPOIETIC RED MARROW RECONVERSION. INFILTRATIVE MYELOMA CANNOT BE EXCLUDED. THERE IS A MORE DISCRETE 1.2 CM ENHANCING LESION IN THE LEFT FEMORAL HEAD THAT COULD ALSO REPRESENT AN ISLAND OF HEMATOPOIETIC RED MARROW. HOWEVER, CONTINUED ATTENTION ON FOLLOW-UP IS RECOMMENDED.    1/9/2021 MRI Lumbar spine/THoracic/Cervical  IMPRESSION:     1. There is interval increase in T2/STIR hyperintensity with enhancement extending from the left pedicle into the articular pillar and left transverse process of the T1 vertebra, although there is no definite decrease in the intrinsic T1 hypointense signal in this location. This may also be a degenerative process, although focal myeloma is not definitely excluded. Clinical correlation is recommended, and follow-up is suggested on future examinations.      2. There has been interval increase in STIR hyperintensity and enhancement along the posterior endplates at the T1-T2 level, most likely progressive degenerative endplate marrow signal changes.     3. No other focal suspicious STIR hyperintense enhancing signal abnormalities are identified.     4. Stable multilevel compression fracture deformities      11/25/2020 Bone survey    IMPRESSION:   1. No lytic or blastic lesions are noted.      2. Stable compression fractures, as  above.     3. Chronic changes, as above.       10/19/22 SPEP: Normal total protein, normal pattern.   10/19/22 sIFE: No monoclonal peaks identified.     Component      Latest Ref Rng & Units 10/19/2022   IgG      650 - 1600 mg/dL 730   IgA      40 - 350 mg/dL 235   IgM      50 - 300 mg/dL 16 (L)   Kappa Free Light Chains      0.33 - 1.94 mg/dL 1.33   Lambda Free Light Chains      0.57 - 2.63 mg/dL 1.03   Kappa/Lambda FLC Ratio      0.26 - 1.65 1.29   Beta-2 Microglobulin      0.0 - 2.5 ug/mL 1.5     Component      Latest Ref Rng & Units 12/5/2022   WBC      3.90 - 12.70 K/uL 2.45 (L)   RBC      4.60 - 6.20 M/uL 3.85 (L)   Hemoglobin      14.0 - 18.0 g/dL 12.0 (L)   Hematocrit      40.0 - 54.0 % 37.2 (L)   MCV      82 - 98 fL 97   MCH      27.0 - 31.0 pg 31.2 (H)   MCHC      32.0 - 36.0 g/dL 32.3   RDW      11.5 - 14.5 % 15.4 (H)   Platelets      150 - 450 K/uL 136 (L)   MPV      9.2 - 12.9 fL 10.1   Beta-2 Microglobulin      0.0 - 2.5 ug/mL 1.7     Assessment:    1. IgA kappa multiple myeloma in remission     -Vicente Connors is a 62 y.o.-year-old male with history of stage IIIA, ISS II IgA kappa multiple myeloma, which is likely intermediate-risk based upon the presence of t(4;14) with hyperdiploidy   -S/ p  tandem auto stem cell transplant in 2015 with a good biochemical remission  -Now on triple maintenance: velcade 1.3 mg/m2 sub q monthly, dex 40 mg PO day of and day after monthly velcade , revlimid 10 mg  PO for 21 days on/ 7 days off every 28 days  --Repeat 24 hour urine completed 4/2022  -No monoclonal protein on SPEP/sIFE done on 10/19/22    --He has been getting MRI spine every 2 years, next due Jan 2023    2. Pancytopenia    --grade 1  -likely secondary to chemotherapy      3. Low Back Pain: chronic and unchanged; not on any analgesic at this time    --Spine MRI done 1/21/21, result detailed above under imaging    4. History of LE DVT:     --Continue Xarelto   --Take with largest meal of the  day  --Notify office of any scheduled procedure/surgery as Xarelto will need to be interrupted   -no bleeding diathesis reported    5. Infectious disease      --Continue prophylactic bactrim and acyclovir   --Tested for measles and ok no booster recommended  --Additional tdep recommended,  last in 2011  --completed Hep B series  --recommend annual flu vaccine, COVID 19 booster      6. Bone health:     --MRI spine1/21 neg   --Repeat 2023   --Last Zometa given 11/3/22, q3 months    7. Health Maintenance:     --Colonoscopy 2011 and 2017, repeat 2022   --PSA  up to date   --covid vaccine 3/31 pfizer booster done  --Got second booster 2/17/22          BMT Chart Routing      Follow up with physician 2 months.   Follow up with SIGRID    Provider visit type    Infusion scheduling note    Injection scheduling note velcade today here; [facility]ade [facility], with labson same day; f/u with labs and for velcade here in 8 weeks   Labs CBC, CMP, immunoglobulins, SPEP, immunofixation and free light chains   Lab interval:  cbc, cmp in 4 weeks; cbc, cmp, spep, light chains, immunofixation, igg, igm, iga  in 8 weeks   Imaging    Pharmacy appointment    Other referrals

## 2022-12-05 NOTE — NURSING
Patient tolerated Velcade with no complications. VSS. Pt instructed to call MD with any problems. Pt discharged home independently.

## 2022-12-06 LAB
ALBUMIN SERPL ELPH-MCNC: 3.52 G/DL (ref 3.35–5.55)
ALPHA1 GLOB SERPL ELPH-MCNC: 0.27 G/DL (ref 0.17–0.41)
ALPHA2 GLOB SERPL ELPH-MCNC: 0.55 G/DL (ref 0.43–0.99)
B-GLOBULIN SERPL ELPH-MCNC: 0.74 G/DL (ref 0.5–1.1)
GAMMA GLOB SERPL ELPH-MCNC: 0.72 G/DL (ref 0.67–1.58)
INTERPRETATION SERPL IFE-IMP: NORMAL
KAPPA LC SER QL IA: 1.96 MG/DL (ref 0.33–1.94)
KAPPA LC/LAMBDA SER IA: 1.88 (ref 0.26–1.65)
LAMBDA LC SER QL IA: 1.04 MG/DL (ref 0.57–2.63)
PATHOLOGIST INTERPRETATION IFE: NORMAL
PATHOLOGIST INTERPRETATION SPE: NORMAL
PROT SERPL-MCNC: 5.8 G/DL (ref 6–8.4)

## 2022-12-27 ENCOUNTER — TELEPHONE (OUTPATIENT)
Dept: HEMATOLOGY/ONCOLOGY | Facility: CLINIC | Age: 62
End: 2022-12-27
Payer: COMMERCIAL

## 2022-12-27 NOTE — TELEPHONE ENCOUNTER
"----- Message from Toby Benítez sent at 12/27/2022  4:46 PM CST -----  Consult/Advisory:          Name Of Caller: [facility]       Contact Preference?:  [phone]    Fax [phone]      Provider Name: Carmel      Does patient feel the need to be seen today? No      What is the nature of the call?: Requesting celgene auth for lenalidomide (REVLIMID) 10 mg Cap              Additional Notes:  "Thank you for all that you do for our patients"      "

## 2023-01-03 ENCOUNTER — LAB VISIT (OUTPATIENT)
Dept: LAB | Facility: OTHER | Age: 63
End: 2023-01-03
Attending: INTERNAL MEDICINE
Payer: COMMERCIAL

## 2023-01-03 DIAGNOSIS — C90.01 MULTIPLE MYELOMA IN REMISSION: ICD-10-CM

## 2023-01-03 DIAGNOSIS — Z94.81 AUTOLOGOUS BONE MARROW TRANSPLANTATION STATUS: ICD-10-CM

## 2023-01-03 LAB
ALBUMIN SERPL BCP-MCNC: 3.3 G/DL (ref 3.5–5.2)
ALP SERPL-CCNC: 52 U/L (ref 55–135)
ALT SERPL W/O P-5'-P-CCNC: 30 U/L (ref 10–44)
ANION GAP SERPL CALC-SCNC: 9 MMOL/L (ref 8–16)
AST SERPL-CCNC: 30 U/L (ref 10–40)
BASOPHILS # BLD AUTO: 0.01 K/UL (ref 0–0.2)
BASOPHILS NFR BLD: 0.3 % (ref 0–1.9)
BILIRUB SERPL-MCNC: 1.5 MG/DL (ref 0.1–1)
BUN SERPL-MCNC: 11 MG/DL (ref 8–23)
CALCIUM SERPL-MCNC: 8.6 MG/DL (ref 8.7–10.5)
CHLORIDE SERPL-SCNC: 109 MMOL/L (ref 95–110)
CO2 SERPL-SCNC: 22 MMOL/L (ref 23–29)
CREAT SERPL-MCNC: 0.9 MG/DL (ref 0.5–1.4)
DIFFERENTIAL METHOD: ABNORMAL
EOSINOPHIL # BLD AUTO: 0.1 K/UL (ref 0–0.5)
EOSINOPHIL NFR BLD: 2.8 % (ref 0–8)
ERYTHROCYTE [DISTWIDTH] IN BLOOD BY AUTOMATED COUNT: 15.9 % (ref 11.5–14.5)
EST. GFR  (NO RACE VARIABLE): >60 ML/MIN/1.73 M^2
GLUCOSE SERPL-MCNC: 107 MG/DL (ref 70–110)
HCT VFR BLD AUTO: 38.8 % (ref 40–54)
HGB BLD-MCNC: 13 G/DL (ref 14–18)
IMM GRANULOCYTES # BLD AUTO: 0.01 K/UL (ref 0–0.04)
IMM GRANULOCYTES NFR BLD AUTO: 0.3 % (ref 0–0.5)
LYMPHOCYTES # BLD AUTO: 1.3 K/UL (ref 1–4.8)
LYMPHOCYTES NFR BLD: 43.4 % (ref 18–48)
MCH RBC QN AUTO: 32 PG (ref 27–31)
MCHC RBC AUTO-ENTMCNC: 33.5 G/DL (ref 32–36)
MCV RBC AUTO: 96 FL (ref 82–98)
MONOCYTES # BLD AUTO: 0.5 K/UL (ref 0.3–1)
MONOCYTES NFR BLD: 15.9 % (ref 4–15)
NEUTROPHILS # BLD AUTO: 1.1 K/UL (ref 1.8–7.7)
NEUTROPHILS NFR BLD: 37.3 % (ref 38–73)
NRBC BLD-RTO: 0 /100 WBC
PLATELET # BLD AUTO: 136 K/UL (ref 150–450)
PMV BLD AUTO: 10 FL (ref 9.2–12.9)
POTASSIUM SERPL-SCNC: 3.9 MMOL/L (ref 3.5–5.1)
PROT SERPL-MCNC: 6.3 G/DL (ref 6–8.4)
RBC # BLD AUTO: 4.06 M/UL (ref 4.6–6.2)
SODIUM SERPL-SCNC: 140 MMOL/L (ref 136–145)
WBC # BLD AUTO: 2.9 K/UL (ref 3.9–12.7)

## 2023-01-03 PROCEDURE — 80053 COMPREHEN METABOLIC PANEL: CPT | Performed by: INTERNAL MEDICINE

## 2023-01-03 PROCEDURE — 85025 COMPLETE CBC W/AUTO DIFF WBC: CPT | Performed by: INTERNAL MEDICINE

## 2023-01-03 PROCEDURE — 36415 COLL VENOUS BLD VENIPUNCTURE: CPT | Performed by: INTERNAL MEDICINE

## 2023-01-03 RX ORDER — LENALIDOMIDE 10 MG/1
1 CAPSULE ORAL SEE ADMIN INSTRUCTIONS
Qty: 21 EACH | Refills: 0 | Status: SHIPPED | OUTPATIENT
Start: 2023-01-03 | End: 2023-01-05 | Stop reason: SDUPTHER

## 2023-01-03 NOTE — TELEPHONE ENCOUNTER
"----- Message from Toby Benítez sent at 1/3/2023  8:34 AM CST -----  Consult/Advisory:          Name Of Caller: [facility]          Contact Preference?:   [phone]    Fax: [phone]        Provider Name: Carmel      Does patient feel the need to be seen today? No      What is the nature of the call?: Requesting clarification of status for lenalidomide (REVLIMID) 10 mg Cap refill              Additional Notes:  "Thank you for all that you do for our patients"      "

## 2023-01-04 ENCOUNTER — SPECIALTY PHARMACY (OUTPATIENT)
Dept: PHARMACY | Facility: CLINIC | Age: 63
End: 2023-01-04
Payer: COMMERCIAL

## 2023-01-04 ENCOUNTER — INFUSION (OUTPATIENT)
Dept: INFUSION THERAPY | Facility: OTHER | Age: 63
End: 2023-01-04
Attending: OBSTETRICS & GYNECOLOGY
Payer: COMMERCIAL

## 2023-01-04 VITALS
OXYGEN SATURATION: 97 % | HEIGHT: 68 IN | TEMPERATURE: 98 F | RESPIRATION RATE: 16 BRPM | SYSTOLIC BLOOD PRESSURE: 142 MMHG | BODY MASS INDEX: 27.96 KG/M2 | DIASTOLIC BLOOD PRESSURE: 83 MMHG | HEART RATE: 77 BPM | WEIGHT: 184.5 LBS

## 2023-01-04 DIAGNOSIS — C90.01 MULTIPLE MYELOMA IN REMISSION: Primary | ICD-10-CM

## 2023-01-04 PROCEDURE — 96401 CHEMO ANTI-NEOPL SQ/IM: CPT

## 2023-01-04 PROCEDURE — 63600175 PHARM REV CODE 636 W HCPCS: Mod: JG | Performed by: NURSE PRACTITIONER

## 2023-01-04 RX ORDER — HEPARIN 100 UNIT/ML
500 SYRINGE INTRAVENOUS
Status: CANCELLED | OUTPATIENT
Start: 2023-01-04

## 2023-01-04 RX ORDER — SODIUM CHLORIDE 0.9 % (FLUSH) 0.9 %
10 SYRINGE (ML) INJECTION
Status: CANCELLED | OUTPATIENT
Start: 2023-01-04

## 2023-01-04 RX ORDER — HEPARIN 100 UNIT/ML
500 SYRINGE INTRAVENOUS
Status: DISCONTINUED | OUTPATIENT
Start: 2023-01-04 | End: 2023-01-04 | Stop reason: HOSPADM

## 2023-01-04 RX ORDER — LENALIDOMIDE 10 MG/1
1 CAPSULE ORAL SEE ADMIN INSTRUCTIONS
Qty: 21 EACH | Refills: 0 | Status: CANCELLED | OUTPATIENT
Start: 2023-01-04

## 2023-01-04 RX ORDER — BORTEZOMIB 3.5 MG/1
1.3 INJECTION, POWDER, LYOPHILIZED, FOR SOLUTION INTRAVENOUS; SUBCUTANEOUS
Status: CANCELLED | OUTPATIENT
Start: 2023-01-04

## 2023-01-04 RX ORDER — SODIUM CHLORIDE 0.9 % (FLUSH) 0.9 %
10 SYRINGE (ML) INJECTION
Status: DISCONTINUED | OUTPATIENT
Start: 2023-01-04 | End: 2023-01-04 | Stop reason: HOSPADM

## 2023-01-04 RX ORDER — BORTEZOMIB 3.5 MG/1
1.3 INJECTION, POWDER, LYOPHILIZED, FOR SOLUTION INTRAVENOUS; SUBCUTANEOUS
Status: COMPLETED | OUTPATIENT
Start: 2023-01-04 | End: 2023-01-04

## 2023-01-04 RX ORDER — LENALIDOMIDE 10 MG/1
1 CAPSULE ORAL SEE ADMIN INSTRUCTIONS
Qty: 21 EACH | Refills: 0 | Status: SHIPPED | OUTPATIENT
Start: 2023-01-04 | End: 2023-02-02

## 2023-01-04 RX ADMIN — BORTEZOMIB 2.6 MG: 3.5 INJECTION, POWDER, LYOPHILIZED, FOR SOLUTION INTRAVENOUS; SUBCUTANEOUS at 09:01

## 2023-01-04 NOTE — TELEPHONE ENCOUNTER
From previous fills, pt has been filling with [facility]. Called and LVM with patient to confirm that he would like to continue filling with [facility]. Provided pt with [facility] information to call back if he has any questions.

## 2023-01-04 NOTE — PLAN OF CARE
Velcade subcutaneous injection administered, no reaction. Patient tolerated well. No apparent distress noted. Discharge instructions given to patient. Patient understands instructions. Follow up appointment scheduled.

## 2023-01-05 ENCOUNTER — SPECIALTY PHARMACY (OUTPATIENT)
Dept: PHARMACY | Facility: CLINIC | Age: 63
End: 2023-01-05
Payer: COMMERCIAL

## 2023-01-05 DIAGNOSIS — C90.01 MULTIPLE MYELOMA IN REMISSION: ICD-10-CM

## 2023-01-05 RX ORDER — LENALIDOMIDE 10 MG/1
1 CAPSULE ORAL SEE ADMIN INSTRUCTIONS
Qty: 21 EACH | Refills: 0 | Status: SHIPPED | OUTPATIENT
Start: 2023-01-05 | End: 2023-02-02

## 2023-01-05 NOTE — TELEPHONE ENCOUNTER
"----- Message from Toby Benítez sent at 1/5/2023  8:35 AM CST -----  Consult/Advisory:        Name Of Caller: [facility]        Contact Preference?:  [phone]   Fax: [phone]        Provider Name: Penny      Does patient feel the need to be seen today? No      What is the nature of the call?: Requesting prior auth for lenalidomide 10 mg Cap          Additional Notes:  "Thank you for all that you do for our patients"      "

## 2023-01-05 NOTE — TELEPHONE ENCOUNTER
Incoming call from Emily from [facility]. Stated that she [facility] needed [facility]. Advised Emily to send over new order and PA could be submitted.     Order received. PA submitted as urgent. Will continue to follow up.

## 2023-01-06 NOTE — TELEPHONE ENCOUNTER
PA approved. Case ID#[id number]. Approved 1/5/23-1/4/24. Forwarding to [facility] so pt can continue filling.

## 2023-01-09 ENCOUNTER — PATIENT MESSAGE (OUTPATIENT)
Dept: HEMATOLOGY/ONCOLOGY | Facility: CLINIC | Age: 63
End: 2023-01-09
Payer: COMMERCIAL

## 2023-01-10 ENCOUNTER — PATIENT MESSAGE (OUTPATIENT)
Dept: HEMATOLOGY/ONCOLOGY | Facility: CLINIC | Age: 63
End: 2023-01-10
Payer: COMMERCIAL

## 2023-01-10 DIAGNOSIS — C90.01 MULTIPLE MYELOMA IN REMISSION: ICD-10-CM

## 2023-01-10 RX ORDER — LENALIDOMIDE 10 MG/1
CAPSULE ORAL
Qty: 21 EACH | Refills: 0 | Status: SHIPPED | OUTPATIENT
Start: 2023-01-10 | End: 2023-01-30 | Stop reason: SDUPTHER

## 2023-01-10 NOTE — TELEPHONE ENCOUNTER
Spoke to patient, everything is approved for revlimid. Will have provider send a new prescription with [employer] authorization number.

## 2023-01-10 NOTE — TELEPHONE ENCOUNTER
"----- Message from Nakia Wilde sent at 1/10/2023 10:04 AM CST -----  Consult/Advisory    Name Of Caller: self    Contact Preference?: [phone]    What is the nature of the call?: calling to check status on prior authorization for Revlimid. Requesting a call back with an update           Additional Notes:  "Thank you for all that you do for our patients'"     "

## 2023-01-11 ENCOUNTER — PATIENT MESSAGE (OUTPATIENT)
Dept: HEMATOLOGY/ONCOLOGY | Facility: CLINIC | Age: 63
End: 2023-01-11
Payer: COMMERCIAL

## 2023-01-11 DIAGNOSIS — C90.01 MULTIPLE MYELOMA IN REMISSION: Primary | ICD-10-CM

## 2023-01-11 NOTE — TELEPHONE ENCOUNTER
"----- Message from Dejon Dick sent at 1/11/2023  3:22 PM CST -----  Regarding: Consult/Advisory    Name Of Caller: Self    Contact Preference?:[phone]      What is the nature of the call?: Pt states that he have been experiencing diarrhea and nausea since last Monday. Would like to know if a rx can be sent into the pharmacy and have a call back           Additional Notes:  "Thank you for all that you do for our patients'"     "

## 2023-01-11 NOTE — TELEPHONE ENCOUNTER
"Patient started diarrhea Monday afternoon, has been going about 6-8 times a day. Comes on suddenly, some abdominal cramping, passing lots of gas, feels an "acidic burn in abd." Has tried imodium 2 times yesterday and one double does today with no relief.   "

## 2023-01-12 ENCOUNTER — INFUSION (OUTPATIENT)
Dept: INFUSION THERAPY | Facility: HOSPITAL | Age: 63
End: 2023-01-12
Attending: INTERNAL MEDICINE
Payer: COMMERCIAL

## 2023-01-12 VITALS
SYSTOLIC BLOOD PRESSURE: 129 MMHG | DIASTOLIC BLOOD PRESSURE: 72 MMHG | HEART RATE: 80 BPM | RESPIRATION RATE: 18 BRPM | OXYGEN SATURATION: 97 % | TEMPERATURE: 98 F

## 2023-01-12 DIAGNOSIS — E86.0 DEHYDRATION: Primary | ICD-10-CM

## 2023-01-12 PROCEDURE — 96360 HYDRATION IV INFUSION INIT: CPT

## 2023-01-12 PROCEDURE — 25000003 PHARM REV CODE 250: Performed by: INTERNAL MEDICINE

## 2023-01-12 PROCEDURE — 89055 LEUKOCYTE ASSESSMENT FECAL: CPT | Performed by: INTERNAL MEDICINE

## 2023-01-12 PROCEDURE — 87324 CLOSTRIDIUM AG IA: CPT | Performed by: INTERNAL MEDICINE

## 2023-01-12 RX ORDER — SODIUM CHLORIDE 9 MG/ML
INJECTION, SOLUTION INTRAVENOUS ONCE
Status: COMPLETED | OUTPATIENT
Start: 2023-01-12 | End: 2023-01-12

## 2023-01-12 RX ADMIN — SODIUM CHLORIDE: 9 INJECTION, SOLUTION INTRAVENOUS at 02:01

## 2023-01-12 NOTE — PLAN OF CARE
1400: Pt arrived. Pt A&Ox4. VSS. Labs reviewed. PIV inserted. All benefits of IVFs review and verbal understanding noted. Positive blood return noted prior to infusion. Will continue to monitor.

## 2023-01-13 ENCOUNTER — LAB VISIT (OUTPATIENT)
Dept: LAB | Facility: HOSPITAL | Age: 63
End: 2023-01-13
Payer: COMMERCIAL

## 2023-01-13 ENCOUNTER — PATIENT MESSAGE (OUTPATIENT)
Dept: HEMATOLOGY/ONCOLOGY | Facility: CLINIC | Age: 63
End: 2023-01-13
Payer: COMMERCIAL

## 2023-01-13 DIAGNOSIS — R19.7 DIARRHEA OF PRESUMED INFECTIOUS ORIGIN: ICD-10-CM

## 2023-01-13 LAB
C DIFF GDH STL QL: NEGATIVE
C DIFF TOX A+B STL QL IA: NEGATIVE
WBC #/AREA STL HPF: NORMAL /[HPF]

## 2023-01-13 PROCEDURE — 87329 GIARDIA AG IA: CPT | Performed by: INTERNAL MEDICINE

## 2023-01-13 PROCEDURE — 87045 FECES CULTURE AEROBIC BACT: CPT | Performed by: INTERNAL MEDICINE

## 2023-01-13 PROCEDURE — 87798 DETECT AGENT NOS DNA AMP: CPT | Performed by: INTERNAL MEDICINE

## 2023-01-13 PROCEDURE — 87427 SHIGA-LIKE TOXIN AG IA: CPT | Performed by: INTERNAL MEDICINE

## 2023-01-13 PROCEDURE — 83993 ASSAY FOR CALPROTECTIN FECAL: CPT | Performed by: INTERNAL MEDICINE

## 2023-01-13 PROCEDURE — 87425 ROTAVIRUS AG IA: CPT | Performed by: INTERNAL MEDICINE

## 2023-01-13 PROCEDURE — 87209 SMEAR COMPLEX STAIN: CPT | Performed by: INTERNAL MEDICINE

## 2023-01-13 PROCEDURE — 87046 STOOL CULTR AEROBIC BACT EA: CPT | Mod: 59 | Performed by: INTERNAL MEDICINE

## 2023-01-16 ENCOUNTER — PATIENT MESSAGE (OUTPATIENT)
Dept: HEMATOLOGY/ONCOLOGY | Facility: CLINIC | Age: 63
End: 2023-01-16
Payer: COMMERCIAL

## 2023-01-17 LAB
CRYPTOSP AG STL QL IA: NEGATIVE
G LAMBLIA AG STL QL IA: NEGATIVE
RV AG STL QL IA.RAPID: NEGATIVE

## 2023-01-18 LAB
E COLI SXT1 STL QL IA: NEGATIVE
E COLI SXT2 STL QL IA: NEGATIVE

## 2023-01-20 LAB
BACTERIA STL CULT: NORMAL
BACTERIA STL CULT: NORMAL
HADV DNA SERPL QL NAA+PROBE: NEGATIVE
SPECIMEN SOURCE: NORMAL

## 2023-01-21 LAB — O+P STL MICRO: NORMAL

## 2023-01-24 LAB — CALPROTECTIN STL-MCNT: 33.9 MCG/G

## 2023-01-30 ENCOUNTER — PATIENT MESSAGE (OUTPATIENT)
Dept: HEMATOLOGY/ONCOLOGY | Facility: CLINIC | Age: 63
End: 2023-01-30
Payer: COMMERCIAL

## 2023-01-31 ENCOUNTER — PATIENT MESSAGE (OUTPATIENT)
Dept: HEMATOLOGY/ONCOLOGY | Facility: CLINIC | Age: 63
End: 2023-01-31
Payer: COMMERCIAL

## 2023-01-31 DIAGNOSIS — C90.01 MULTIPLE MYELOMA IN REMISSION: ICD-10-CM

## 2023-01-31 RX ORDER — LENALIDOMIDE 10 MG/1
CAPSULE ORAL
Qty: 21 EACH | Refills: 0 | Status: SHIPPED | OUTPATIENT
Start: 2023-01-31 | End: 2023-03-13 | Stop reason: SDUPTHER

## 2023-02-02 ENCOUNTER — INFUSION (OUTPATIENT)
Dept: INFUSION THERAPY | Facility: HOSPITAL | Age: 63
End: 2023-02-02
Attending: INTERNAL MEDICINE
Payer: COMMERCIAL

## 2023-02-02 ENCOUNTER — OFFICE VISIT (OUTPATIENT)
Dept: HEMATOLOGY/ONCOLOGY | Facility: CLINIC | Age: 63
End: 2023-02-02
Payer: COMMERCIAL

## 2023-02-02 ENCOUNTER — TELEPHONE (OUTPATIENT)
Dept: HEMATOLOGY/ONCOLOGY | Facility: CLINIC | Age: 63
End: 2023-02-02

## 2023-02-02 VITALS
TEMPERATURE: 98 F | HEART RATE: 64 BPM | RESPIRATION RATE: 17 BRPM | HEIGHT: 68 IN | BODY MASS INDEX: 27.76 KG/M2 | SYSTOLIC BLOOD PRESSURE: 143 MMHG | OXYGEN SATURATION: 98 % | DIASTOLIC BLOOD PRESSURE: 80 MMHG | WEIGHT: 183.19 LBS

## 2023-02-02 VITALS — HEART RATE: 75 BPM | RESPIRATION RATE: 18 BRPM | SYSTOLIC BLOOD PRESSURE: 163 MMHG | DIASTOLIC BLOOD PRESSURE: 68 MMHG

## 2023-02-02 DIAGNOSIS — D61.818 PANCYTOPENIA: ICD-10-CM

## 2023-02-02 DIAGNOSIS — G89.3 CANCER ASSOCIATED PAIN: ICD-10-CM

## 2023-02-02 DIAGNOSIS — C90.01 MULTIPLE MYELOMA IN REMISSION: Primary | ICD-10-CM

## 2023-02-02 DIAGNOSIS — Z86.718 HISTORY OF DVT (DEEP VEIN THROMBOSIS): ICD-10-CM

## 2023-02-02 DIAGNOSIS — Z94.81 AUTOLOGOUS BONE MARROW TRANSPLANTATION STATUS: ICD-10-CM

## 2023-02-02 PROCEDURE — 99999 PR PBB SHADOW E&M-EST. PATIENT-LVL III: ICD-10-PCS | Mod: PBBFAC,,, | Performed by: INTERNAL MEDICINE

## 2023-02-02 PROCEDURE — 99215 PR OFFICE/OUTPT VISIT, EST, LEVL V, 40-54 MIN: ICD-10-PCS | Mod: S$GLB,,, | Performed by: INTERNAL MEDICINE

## 2023-02-02 PROCEDURE — 96401 CHEMO ANTI-NEOPL SQ/IM: CPT

## 2023-02-02 PROCEDURE — 3077F SYST BP >= 140 MM HG: CPT | Mod: CPTII,S$GLB,, | Performed by: INTERNAL MEDICINE

## 2023-02-02 PROCEDURE — 3008F PR BODY MASS INDEX (BMI) DOCUMENTED: ICD-10-PCS | Mod: CPTII,S$GLB,, | Performed by: INTERNAL MEDICINE

## 2023-02-02 PROCEDURE — 3077F PR MOST RECENT SYSTOLIC BLOOD PRESSURE >= 140 MM HG: ICD-10-PCS | Mod: CPTII,S$GLB,, | Performed by: INTERNAL MEDICINE

## 2023-02-02 PROCEDURE — 3079F PR MOST RECENT DIASTOLIC BLOOD PRESSURE 80-89 MM HG: ICD-10-PCS | Mod: CPTII,S$GLB,, | Performed by: INTERNAL MEDICINE

## 2023-02-02 PROCEDURE — 3079F DIAST BP 80-89 MM HG: CPT | Mod: CPTII,S$GLB,, | Performed by: INTERNAL MEDICINE

## 2023-02-02 PROCEDURE — 99215 OFFICE O/P EST HI 40 MIN: CPT | Mod: S$GLB,,, | Performed by: INTERNAL MEDICINE

## 2023-02-02 PROCEDURE — 99999 PR PBB SHADOW E&M-EST. PATIENT-LVL III: CPT | Mod: PBBFAC,,, | Performed by: INTERNAL MEDICINE

## 2023-02-02 PROCEDURE — 3008F BODY MASS INDEX DOCD: CPT | Mod: CPTII,S$GLB,, | Performed by: INTERNAL MEDICINE

## 2023-02-02 PROCEDURE — 63600175 PHARM REV CODE 636 W HCPCS: Mod: JW,TB,JG | Performed by: INTERNAL MEDICINE

## 2023-02-02 RX ORDER — LENALIDOMIDE 10 MG/1
CAPSULE ORAL
Qty: 21 EACH | Refills: 0 | Status: SHIPPED | OUTPATIENT
Start: 2023-02-02 | End: 2023-02-26 | Stop reason: SDUPTHER

## 2023-02-02 RX ORDER — DEXAMETHASONE 4 MG/1
TABLET ORAL
Qty: 80 TABLET | Refills: 11 | Status: SHIPPED | OUTPATIENT
Start: 2023-02-02

## 2023-02-02 RX ORDER — SODIUM CHLORIDE 0.9 % (FLUSH) 0.9 %
10 SYRINGE (ML) INJECTION
Status: CANCELLED | OUTPATIENT
Start: 2023-02-02

## 2023-02-02 RX ORDER — HEPARIN 100 UNIT/ML
500 SYRINGE INTRAVENOUS
Status: CANCELLED | OUTPATIENT
Start: 2023-02-02

## 2023-02-02 RX ORDER — ACYCLOVIR 400 MG/1
400 TABLET ORAL 2 TIMES DAILY
Qty: 60 TABLET | Refills: 5 | Status: SHIPPED | OUTPATIENT
Start: 2023-02-02 | End: 2023-02-06 | Stop reason: SDUPTHER

## 2023-02-02 RX ORDER — BORTEZOMIB 3.5 MG/1
1.3 INJECTION, POWDER, LYOPHILIZED, FOR SOLUTION INTRAVENOUS; SUBCUTANEOUS
Status: CANCELLED | OUTPATIENT
Start: 2023-02-02

## 2023-02-02 RX ORDER — LENALIDOMIDE 10 MG/1
1 CAPSULE ORAL SEE ADMIN INSTRUCTIONS
Qty: 21 EACH | Refills: 0 | Status: SHIPPED | OUTPATIENT
Start: 2023-02-02 | End: 2023-03-23

## 2023-02-02 RX ORDER — HEPARIN 100 UNIT/ML
500 SYRINGE INTRAVENOUS
Status: DISCONTINUED | OUTPATIENT
Start: 2023-02-02 | End: 2023-02-02 | Stop reason: HOSPADM

## 2023-02-02 RX ORDER — FOLIC ACID-PYRIDOXINE-CYANOCOBALAMIN TAB 2.5-25-2 MG 2.5-25-2 MG
1 TAB ORAL DAILY
Qty: 30 TABLET | Refills: 11 | Status: SHIPPED | OUTPATIENT
Start: 2023-02-02 | End: 2023-06-14 | Stop reason: SDUPTHER

## 2023-02-02 RX ORDER — BACLOFEN 20 MG
TABLET ORAL DAILY
COMMUNITY

## 2023-02-02 RX ORDER — ACETAMINOPHEN 325 MG/1
TABLET ORAL
COMMUNITY
Start: 2022-08-05 | End: 2023-03-13 | Stop reason: ALTCHOICE

## 2023-02-02 RX ORDER — OMEPRAZOLE 20 MG/1
CAPSULE, DELAYED RELEASE ORAL
COMMUNITY
Start: 2015-06-01

## 2023-02-02 RX ORDER — BORTEZOMIB 3.5 MG/1
1.3 INJECTION, POWDER, LYOPHILIZED, FOR SOLUTION INTRAVENOUS; SUBCUTANEOUS
Status: COMPLETED | OUTPATIENT
Start: 2023-02-02 | End: 2023-02-02

## 2023-02-02 RX ORDER — SODIUM CHLORIDE 0.9 % (FLUSH) 0.9 %
10 SYRINGE (ML) INJECTION
Status: DISCONTINUED | OUTPATIENT
Start: 2023-02-02 | End: 2023-02-02 | Stop reason: HOSPADM

## 2023-02-02 RX ORDER — ZOLEDRONIC ACID 0.04 MG/ML
4 INJECTION, SOLUTION INTRAVENOUS
Status: CANCELLED | OUTPATIENT
Start: 2023-02-02

## 2023-02-02 RX ADMIN — BORTEZOMIB 2.6 MG: 3.5 INJECTION, POWDER, LYOPHILIZED, FOR SOLUTION INTRAVENOUS; SUBCUTANEOUS at 11:02

## 2023-02-02 NOTE — TELEPHONE ENCOUNTER
Verified with [facility] that they received the Rx for Revlimid, and they are currently processing the Rx.    Notified pt.

## 2023-02-02 NOTE — TELEPHONE ENCOUNTER
----- Message from Jessie Zayas sent at 2/2/2023  3:32 PM CST -----  Regarding: Consult/Advisory      Name Of Caller: Vicente Connors      Contact Preference?: [phone]      Nature of Call? Patient calling to make sure his Revlimid went to [facility].

## 2023-02-02 NOTE — PROGRESS NOTES
Chief Complaint : Multiple myeloma, s/p tandem auto SCT, on VRd maintenance, hematology follow up      History of Present Illness : Vicente Connors is a 62 y.o. male here for hematology follow up. He has recently moved to [place] from [place]. He has history of stage IIIA, [employer] stage II IgA kappa multiple myeloma, which is likely intermediate-risk based upon the presence of t(4;14) with hyperdiploidy, based on records available through care everywhere.    IgA Kappa multiple Myeloma was diagnosed in 2014.   He was started on velcade, revlimid and dexamethasone at diagnosis.   Of note, he developed a popliteal DVT and was started on Lovenox and later switched Xarelto.   He completed a stem cell transplant with Dr Schuster in May 2015. He is s/p  tandem autologous transplant in 2015 and has been on triple maintenance with bortezomib, lenalidomide and dexamethasone since then.   He also has peripheral neuropathy, h/o LE DVT on chronic anti-coagulation, GERD, vertebral fracture. He has history of DVT and remains on chronic anticoagulation.  He has had multiple skeletal complications since his original diagnosis.      Interval History: He is here for a follow up visit. He had prolonged bout of diarrhea in January 2023. All stool studies were negative/ unremarkable.      Review of patient's allergies indicates:  No Known Allergies        Current Outpatient Medications   Medication Sig    acyclovir (ZOVIRAX) 400 MG tablet Take 1 tablet by mouth once daily.    bortezomib (VELCADE INJ) Inject as directed. Pt gets every month    calcium carb/vit D3/minerals (CALCIUM-VITAMIN D ORAL)     dexAMETHasone (DECADRON) 4 MG Tab TAKE 10 TABLETS (40 MG DOSE) BY MOUTH DAY OF AND DAY AFTER VELCADE.    folic acid-vit B6-vit B12 2.5-25-2 mg (FOLBIC) 2.5-25-2 mg Tab Take 1 tablet by mouth once daily.    ibuprofen (ADVIL,MOTRIN) 600 MG tablet PRN    Lactobacillus acidophilus (PROBIOTIC ORAL) Take by mouth. 24 billion CFU     lenalidomide (REVLIMID) 10 mg Cap TAKE 1 CAPSULE ON DAYS 1 THROUGH 21 OF A 28 DAY CYCLE AS INSTRUCTED    lenalidomide 10 mg Cap Take 1 capsule by mouth As instructed.    lenalidomide 10 mg Cap Take 1 capsule by mouth As instructed.    lenalidomide 10 mg Cap Take 1 capsule by mouth As instructed.    lenalidomide 10 mg Cap Take 1 capsule by mouth As instructed (days 1-21 of a 28 day cycle).    lenalidomide 10 mg Cap TAKE 1 CAPSULE DAILY FOR 21 DAYS, FOLLOWED BY 7 DAYS OFF  Indications: auth [id number] 2/1/23  Strength: 10 mg.    lenalidomide 10 mg Cap TAKE 1 CAPSULE DAILY FOR 21 DAYS, FOLLOWED BY 7 DAYS OFF  Indications: [id number] 12/27/22  Strength: 10 mg.    magnesium 250 mg Tab     multivitamin with minerals tablet Take 1 tablet by mouth once daily.    omeprazole (PRILOSEC OTC) 20 MG tablet Take 1 tablet by mouth once daily.    rivaroxaban (XARELTO) 15 mg Tab Take 1 tablet by mouth once daily.    sulfamethoxazole-trimethoprim 800-160mg (BACTRIM DS) 800-160 mg Tab TAKE 1 TABLET EVERY MONDAY, WEDNESDAY AND FRIDAY    UNABLE TO FIND medication name: Benefiber Prebiotic    zoledronic acid (ZOMETA IV) Inject into the vein. Pt gets every 3 months     No current facility-administered medications for this visit.      Review of Systems   Constitutional:  Positive for malaise/fatigue. Negative for chills, fever and weight loss.   HENT:  Negative for congestion, ear pain and sinus pain.    Eyes:  Negative for double vision, photophobia and pain.   Respiratory:  Negative for sputum production and stridor.    Cardiovascular:  Negative for chest pain, palpitations, orthopnea and claudication.   Gastrointestinal:  Negative for blood in stool, constipation, diarrhea, melena and nausea.   Genitourinary:  Negative for dysuria, frequency and urgency.   Musculoskeletal:  Negative for myalgias and neck pain.   Neurological:  Positive for tingling. Negative for dizziness, tremors and speech change.   Endo/Heme/Allergies:  Negative for  environmental allergies. Does not bruise/bleed easily.   Psychiatric/Behavioral:  Negative for substance abuse and suicidal ideas.       Vitals:    02/02/23 0916   BP: (!) 143/80   Pulse: 64   Resp: 17   Temp: 98 °F (36.7 °C)         PS: ECOG 1    Physical Exam  HENT:      Head: Normocephalic and atraumatic.      Mouth/Throat:      Pharynx: No posterior oropharyngeal erythema.   Eyes:      General: No scleral icterus.  Cardiovascular:      Rate and Rhythm: Normal rate and regular rhythm.   Pulmonary:      Effort: Pulmonary effort is normal. No respiratory distress.      Breath sounds: Normal breath sounds.   Abdominal:      General: There is no distension.      Palpations: There is no mass.      Tenderness: There is no abdominal tenderness.   Musculoskeletal:         General: No swelling.   Lymphadenopathy:      Cervical: No cervical adenopathy.   Neurological:      General: No focal deficit present.      Mental Status: He is alert and oriented to person, place, and time.      Cranial Nerves: No cranial nerve deficit.      Component      Latest Ref Rng & Units 2/2/2023   WBC      3.90 - 12.70 K/uL 2.49 (L)   RBC      4.60 - 6.20 M/uL 4.10 (L)   Hemoglobin      14.0 - 18.0 g/dL 13.0 (L)   Hematocrit      40.0 - 54.0 % 39.9 (L)   MCV      82 - 98 fL 97   MCH      27.0 - 31.0 pg 31.7 (H)   MCHC      32.0 - 36.0 g/dL 32.6   RDW      11.5 - 14.5 % 15.3 (H)   Platelets      150 - 450 K/uL 149 (L)   MPV      9.2 - 12.9 fL 9.8   Immature Granulocytes      0.0 - 0.5 % 0.4   Gran # (ANC)      1.8 - 7.7 K/uL 0.8 (L)   Immature Grans (Abs)      0.00 - 0.04 K/uL 0.01   Lymph #      1.0 - 4.8 K/uL 1.2   Mono #      0.3 - 1.0 K/uL 0.4   Eos #      0.0 - 0.5 K/uL 0.1   Baso #      0.00 - 0.20 K/uL 0.02   nRBC      0 /100 WBC 0   Gran %      38.0 - 73.0 % 30.1 (L)   Lymph %      18.0 - 48.0 % 48.6 (H)   Mono %      4.0 - 15.0 % 17.3 (H)   Eosinophil %      0.0 - 8.0 % 2.8   Basophil %      0.0 - 1.9 % 0.8   Differential Method        Automated   Sodium      136 - 145 mmol/L 141   Potassium      3.5 - 5.1 mmol/L 4.1   Chloride      95 - 110 mmol/L 107   CO2      23 - 29 mmol/L 27   Glucose      70 - 110 mg/dL 95   BUN      8 - 23 mg/dL 11   Creatinine      0.5 - 1.4 mg/dL 0.9   Calcium      8.7 - 10.5 mg/dL 9.4   PROTEIN TOTAL      6.0 - 8.4 g/dL 6.4   Albumin      3.5 - 5.2 g/dL 3.5   BILIRUBIN TOTAL      0.1 - 1.0 mg/dL 1.5 (H)   Alkaline Phosphatase      55 - 135 U/L 56   AST      10 - 40 U/L 24   ALT      10 - 44 U/L 26   Anion Gap      8 - 16 mmol/L 7 (L)   eGFR      >60 mL/min/1.73 m:2 >60.0   IgG      650 - 1600 mg/dL 703   IgA      40 - 350 mg/dL 195   IgM      50 - 300 mg/dL 22 (L)         1/9/2021 MRI Pelvis    IMPRESSION:   DIFFUSE PELVIC, PROXIMAL FEMORAL AND LOWER LUMBAR HETEROGENEOUS MARROW SIGNAL ABNORMALITY COULD RELATE TO HEMATOPOIETIC RED MARROW RECONVERSION. INFILTRATIVE MYELOMA CANNOT BE EXCLUDED. THERE IS A MORE DISCRETE 1.2 CM ENHANCING LESION IN THE LEFT FEMORAL HEAD THAT COULD ALSO REPRESENT AN ISLAND OF HEMATOPOIETIC RED MARROW. HOWEVER, CONTINUED ATTENTION ON FOLLOW-UP IS RECOMMENDED.    1/9/2021 MRI Lumbar spine/THoracic/Cervical  IMPRESSION:     1. There is interval increase in T2/STIR hyperintensity with enhancement extending from the left pedicle into the articular pillar and left transverse process of the T1 vertebra, although there is no definite decrease in the intrinsic T1 hypointense signal in this location. This may also be a degenerative process, although focal myeloma is not definitely excluded. Clinical correlation is recommended, and follow-up is suggested on future examinations.      2. There has been interval increase in STIR hyperintensity and enhancement along the posterior endplates at the T1-T2 level, most likely progressive degenerative endplate marrow signal changes.     3. No other focal suspicious STIR hyperintense enhancing signal abnormalities are identified.     4. Stable multilevel compression  fracture deformities      11/25/2020 Bone survey    IMPRESSION:   1. No lytic or blastic lesions are noted.      2. Stable compression fractures, as above.     3. Chronic changes, as above.       10/19/22 SPEP: Normal total protein, normal pattern.   10/19/22 sIFE: No monoclonal peaks identified.     Assessment:    1. IgA kappa multiple myeloma in remission     -Vicnete Connors is a 62 y.o.-year-old male with history of stage IIIA, ISS II IgA kappa multiple myeloma, which is likely intermediate-risk based upon the presence of t(4;14) with hyperdiploidy   -S/ p  tandem auto stem cell transplant in 2015 with a good biochemical remission  -Now on triple maintenance: velcade 1.3 mg/m2 sub q monthly, dex 40 mg PO day of and day after monthly velcade , revlimid 10 mg  PO for 21 days on/ 7 days off every 28 days  --Repeat 24 hour urine completed 4/2022  -No monoclonal protein on SPEP/sIFE done on 10/19/22    --He has been getting MRI spine every 2 years, next due Jan 2023    2. Pancytopenia    --grade 1  -likely secondary to chemotherapy      3. Low Back Pain: chronic and unchanged; not on any analgesic at this time    --Spine MRI done 1/21/21, result detailed above under imaging    4. History of LE DVT:     --Continue Xarelto   --Take with largest meal of the day  --Notify office of any scheduled procedure/surgery as Xarelto will need to be interrupted   -no bleeding diathesis reported    5. Infectious disease      --Continue prophylactic bactrim and acyclovir   --Tested for measles and ok no booster recommended  --Additional tdep recommended,  last in 2011  --completed Hep B series  --recommend annual flu vaccine, COVID 19 booster      6. Bone health:     --MRI spine1/21 neg   --Repeat 2023   --Last Zometa given 11/3/22, q3 months    7. Diarrhea    --now improved  -stool studies neagtive in jan 2023    8. Health Maintenance:     --Colonoscopy 2011 and 2017, repeat 2022   --PSA  up to date   --covid vaccine 3/31 pfizer  booster done  --Got second booster 2/17/22          BMT Chart Routing      Follow up with physician . In 8 weeks, same day as velcade inj   Follow up with [facility]    Provider visit type    Infusion scheduling note    Injection scheduling note velcade today,zometa today , velcade in in 4 weeks, velcade in 8 weeks   Labs CBC, CMP, SPEP, immunofixation, immunoglobulins and free light chains   Lab interval:  cbc, cmp in 4 weeks and in 8 weeks; cbc, cmp, spep, light chains, immunmofixation, igg, igm, iga, in 8 weeks   Imaging MRI   mri spine, mri pelvi sin 1-2 weeks   Pharmacy appointment    Other referrals

## 2023-02-03 ENCOUNTER — TELEPHONE (OUTPATIENT)
Dept: HEMATOLOGY/ONCOLOGY | Facility: CLINIC | Age: 63
End: 2023-02-03
Payer: COMMERCIAL

## 2023-02-03 ENCOUNTER — PATIENT MESSAGE (OUTPATIENT)
Dept: HEMATOLOGY/ONCOLOGY | Facility: CLINIC | Age: 63
End: 2023-02-03
Payer: COMMERCIAL

## 2023-02-03 NOTE — TELEPHONE ENCOUNTER
----- Message from Doug Fernando sent at 2/3/2023  4:16 PM CST -----      Name of Who is Calling: ELEAZAR DIEZ [[id number]]      What is the request in detail: Pt called to confirm if other order were put in to go with infusion.Please contact to further discuss and advise.          Can the clinic reply by [facility]: Y      What Number to Call Back if not in [facility]: [phone]

## 2023-02-06 ENCOUNTER — TELEPHONE (OUTPATIENT)
Dept: HEMATOLOGY/ONCOLOGY | Facility: CLINIC | Age: 63
End: 2023-02-06
Payer: COMMERCIAL

## 2023-02-06 ENCOUNTER — PATIENT MESSAGE (OUTPATIENT)
Dept: HEMATOLOGY/ONCOLOGY | Facility: CLINIC | Age: 63
End: 2023-02-06
Payer: COMMERCIAL

## 2023-02-06 DIAGNOSIS — Z94.81 AUTOLOGOUS BONE MARROW TRANSPLANTATION STATUS: ICD-10-CM

## 2023-02-06 DIAGNOSIS — Z86.718 HISTORY OF DVT (DEEP VEIN THROMBOSIS): ICD-10-CM

## 2023-02-06 DIAGNOSIS — C90.01 MULTIPLE MYELOMA IN REMISSION: ICD-10-CM

## 2023-02-06 RX ORDER — ACYCLOVIR 400 MG/1
400 TABLET ORAL 2 TIMES DAILY
Qty: 180 TABLET | Refills: 4 | Status: SHIPPED | OUTPATIENT
Start: 2023-02-06

## 2023-02-06 NOTE — TELEPHONE ENCOUNTER
Folbic picked up at local pharmacy per pt. Advised [facility], they can disregard Rx for folbic.

## 2023-02-06 NOTE — TELEPHONE ENCOUNTER
----- Message from Dejon Jennings sent at 2/6/2023  2:00 PM CST -----  Pharmacy calling for assistance [facility]        Reason for Call: Needs clarification on  (FOLBIC)     Name of caller: Lyle    Pharmacy name and phone number [phone] option number [phone]

## 2023-02-19 ENCOUNTER — PATIENT MESSAGE (OUTPATIENT)
Dept: HEMATOLOGY/ONCOLOGY | Facility: CLINIC | Age: 63
End: 2023-02-19
Payer: COMMERCIAL

## 2023-02-28 ENCOUNTER — PATIENT MESSAGE (OUTPATIENT)
Dept: HEMATOLOGY/ONCOLOGY | Facility: CLINIC | Age: 63
End: 2023-02-28
Payer: COMMERCIAL

## 2023-03-02 ENCOUNTER — INFUSION (OUTPATIENT)
Dept: INFUSION THERAPY | Facility: OTHER | Age: 63
End: 2023-03-02
Attending: OBSTETRICS & GYNECOLOGY
Payer: COMMERCIAL

## 2023-03-02 ENCOUNTER — LAB VISIT (OUTPATIENT)
Dept: LAB | Facility: OTHER | Age: 63
End: 2023-03-02
Attending: INTERNAL MEDICINE
Payer: COMMERCIAL

## 2023-03-02 VITALS
SYSTOLIC BLOOD PRESSURE: 149 MMHG | DIASTOLIC BLOOD PRESSURE: 77 MMHG | HEART RATE: 64 BPM | WEIGHT: 183.88 LBS | OXYGEN SATURATION: 97 % | TEMPERATURE: 98 F | RESPIRATION RATE: 17 BRPM | BODY MASS INDEX: 27.87 KG/M2 | HEIGHT: 68 IN

## 2023-03-02 DIAGNOSIS — D61.818 PANCYTOPENIA: ICD-10-CM

## 2023-03-02 DIAGNOSIS — C90.01 MULTIPLE MYELOMA IN REMISSION: ICD-10-CM

## 2023-03-02 DIAGNOSIS — Z94.81 AUTOLOGOUS BONE MARROW TRANSPLANTATION STATUS: ICD-10-CM

## 2023-03-02 DIAGNOSIS — C90.01 MULTIPLE MYELOMA IN REMISSION: Primary | ICD-10-CM

## 2023-03-02 LAB
ALBUMIN SERPL BCP-MCNC: 3.7 G/DL (ref 3.5–5.2)
ALP SERPL-CCNC: 56 U/L (ref 55–135)
ALT SERPL W/O P-5'-P-CCNC: 27 U/L (ref 10–44)
ANION GAP SERPL CALC-SCNC: 5 MMOL/L (ref 8–16)
ANISOCYTOSIS BLD QL SMEAR: SLIGHT
AST SERPL-CCNC: 28 U/L (ref 10–40)
BASOPHILS # BLD AUTO: 0.03 K/UL (ref 0–0.2)
BASOPHILS NFR BLD: 1 % (ref 0–1.9)
BILIRUB SERPL-MCNC: 2.1 MG/DL (ref 0.1–1)
BUN SERPL-MCNC: 12 MG/DL (ref 8–23)
CALCIUM SERPL-MCNC: 9 MG/DL (ref 8.7–10.5)
CHLORIDE SERPL-SCNC: 109 MMOL/L (ref 95–110)
CO2 SERPL-SCNC: 26 MMOL/L (ref 23–29)
CREAT SERPL-MCNC: 1 MG/DL (ref 0.5–1.4)
DIFFERENTIAL METHOD: ABNORMAL
EOSINOPHIL # BLD AUTO: 0.1 K/UL (ref 0–0.5)
EOSINOPHIL NFR BLD: 2.9 % (ref 0–8)
ERYTHROCYTE [DISTWIDTH] IN BLOOD BY AUTOMATED COUNT: 14.9 % (ref 11.5–14.5)
EST. GFR  (NO RACE VARIABLE): >60 ML/MIN/1.73 M^2
GLUCOSE SERPL-MCNC: 117 MG/DL (ref 70–110)
HCT VFR BLD AUTO: 40.7 % (ref 40–54)
HGB BLD-MCNC: 13.5 G/DL (ref 14–18)
IGA SERPL-MCNC: 194 MG/DL (ref 40–350)
IGG SERPL-MCNC: 682 MG/DL (ref 650–1600)
IGM SERPL-MCNC: 17 MG/DL (ref 50–300)
IMM GRANULOCYTES # BLD AUTO: 0.01 K/UL (ref 0–0.04)
IMM GRANULOCYTES NFR BLD AUTO: 0.3 % (ref 0–0.5)
LYMPHOCYTES # BLD AUTO: 1.6 K/UL (ref 1–4.8)
LYMPHOCYTES NFR BLD: 49.8 % (ref 18–48)
MCH RBC QN AUTO: 32.5 PG (ref 27–31)
MCHC RBC AUTO-ENTMCNC: 33.2 G/DL (ref 32–36)
MCV RBC AUTO: 98 FL (ref 82–98)
MONOCYTES # BLD AUTO: 0.5 K/UL (ref 0.3–1)
MONOCYTES NFR BLD: 16 % (ref 4–15)
NEUTROPHILS # BLD AUTO: 0.9 K/UL (ref 1.8–7.7)
NEUTROPHILS NFR BLD: 30 % (ref 38–73)
NRBC BLD-RTO: 0 /100 WBC
OVALOCYTES BLD QL SMEAR: ABNORMAL
PHOSPHATE SERPL-MCNC: 2.9 MG/DL (ref 2.7–4.5)
PLATELET # BLD AUTO: 123 K/UL (ref 150–450)
PLATELET BLD QL SMEAR: ABNORMAL
PMV BLD AUTO: 9.4 FL (ref 9.2–12.9)
POIKILOCYTOSIS BLD QL SMEAR: SLIGHT
POTASSIUM SERPL-SCNC: 4 MMOL/L (ref 3.5–5.1)
PROT SERPL-MCNC: 6.5 G/DL (ref 6–8.4)
RBC # BLD AUTO: 4.16 M/UL (ref 4.6–6.2)
SODIUM SERPL-SCNC: 140 MMOL/L (ref 136–145)
WBC # BLD AUTO: 3.13 K/UL (ref 3.9–12.7)

## 2023-03-02 PROCEDURE — 36415 COLL VENOUS BLD VENIPUNCTURE: CPT | Performed by: INTERNAL MEDICINE

## 2023-03-02 PROCEDURE — 96365 THER/PROPH/DIAG IV INF INIT: CPT

## 2023-03-02 PROCEDURE — 86334 IMMUNOFIX E-PHORESIS SERUM: CPT | Mod: 26,,, | Performed by: PATHOLOGY

## 2023-03-02 PROCEDURE — 84165 PATHOLOGIST INTERPRETATION SPE: ICD-10-PCS | Mod: 26,,, | Performed by: PATHOLOGY

## 2023-03-02 PROCEDURE — 82784 ASSAY IGA/IGD/IGG/IGM EACH: CPT | Mod: 59 | Performed by: INTERNAL MEDICINE

## 2023-03-02 PROCEDURE — 85025 COMPLETE CBC W/AUTO DIFF WBC: CPT | Performed by: INTERNAL MEDICINE

## 2023-03-02 PROCEDURE — 86334 IMMUNOFIX E-PHORESIS SERUM: CPT | Performed by: INTERNAL MEDICINE

## 2023-03-02 PROCEDURE — 80053 COMPREHEN METABOLIC PANEL: CPT | Performed by: INTERNAL MEDICINE

## 2023-03-02 PROCEDURE — 63600175 PHARM REV CODE 636 W HCPCS: Performed by: INTERNAL MEDICINE

## 2023-03-02 PROCEDURE — 83521 IG LIGHT CHAINS FREE EACH: CPT | Performed by: INTERNAL MEDICINE

## 2023-03-02 PROCEDURE — 84165 PROTEIN E-PHORESIS SERUM: CPT | Mod: 26,,, | Performed by: PATHOLOGY

## 2023-03-02 PROCEDURE — 84165 PROTEIN E-PHORESIS SERUM: CPT | Performed by: INTERNAL MEDICINE

## 2023-03-02 PROCEDURE — 84100 ASSAY OF PHOSPHORUS: CPT | Performed by: INTERNAL MEDICINE

## 2023-03-02 PROCEDURE — 86334 PATHOLOGIST INTERPRETATION IFE: ICD-10-PCS | Mod: 26,,, | Performed by: PATHOLOGY

## 2023-03-02 PROCEDURE — 25000003 PHARM REV CODE 250: Performed by: INTERNAL MEDICINE

## 2023-03-02 RX ORDER — SODIUM CHLORIDE 0.9 % (FLUSH) 0.9 %
10 SYRINGE (ML) INJECTION
Status: DISCONTINUED | OUTPATIENT
Start: 2023-03-02 | End: 2023-03-02 | Stop reason: HOSPADM

## 2023-03-02 RX ORDER — HEPARIN 100 UNIT/ML
500 SYRINGE INTRAVENOUS
Status: DISCONTINUED | OUTPATIENT
Start: 2023-03-02 | End: 2023-03-02 | Stop reason: HOSPADM

## 2023-03-02 RX ADMIN — SODIUM CHLORIDE: 9 INJECTION, SOLUTION INTRAVENOUS at 08:03

## 2023-03-02 RX ADMIN — ZOLEDRONIC ACID 4 MG: 4 INJECTION, SOLUTION, CONCENTRATE INTRAVENOUS at 09:03

## 2023-03-02 NOTE — PLAN OF CARE
Zometa infusion administered, no reaction. Patient tolerated well. 24 gauge IV removed, catheter intact. No apparent distress noted. Discharge instructions given to patient. Patient understands instructions.

## 2023-03-03 LAB
ALBUMIN SERPL ELPH-MCNC: 3.73 G/DL (ref 3.35–5.55)
ALPHA1 GLOB SERPL ELPH-MCNC: 0.28 G/DL (ref 0.17–0.41)
ALPHA2 GLOB SERPL ELPH-MCNC: 0.56 G/DL (ref 0.43–0.99)
B-GLOBULIN SERPL ELPH-MCNC: 0.77 G/DL (ref 0.5–1.1)
GAMMA GLOB SERPL ELPH-MCNC: 0.67 G/DL (ref 0.67–1.58)
INTERPRETATION SERPL IFE-IMP: NORMAL
KAPPA LC SER QL IA: 1.79 MG/DL (ref 0.33–1.94)
KAPPA LC/LAMBDA SER IA: 1.52 (ref 0.26–1.65)
LAMBDA LC SER QL IA: 1.18 MG/DL (ref 0.57–2.63)
PROT SERPL-MCNC: 6 G/DL (ref 6–8.4)

## 2023-03-06 LAB
PATHOLOGIST INTERPRETATION IFE: NORMAL
PATHOLOGIST INTERPRETATION SPE: NORMAL

## 2023-03-09 ENCOUNTER — HOSPITAL ENCOUNTER (OUTPATIENT)
Dept: RADIOLOGY | Facility: HOSPITAL | Age: 63
Discharge: HOME | End: 2023-03-09
Attending: INTERNAL MEDICINE
Payer: COMMERCIAL

## 2023-03-09 DIAGNOSIS — Z94.81 AUTOLOGOUS BONE MARROW TRANSPLANTATION STATUS: ICD-10-CM

## 2023-03-09 DIAGNOSIS — C90.01 MULTIPLE MYELOMA IN REMISSION: ICD-10-CM

## 2023-03-09 PROCEDURE — 72158 MRI LUMBAR SPINE W/O & W/DYE: CPT | Mod: 26,,, | Performed by: RADIOLOGY

## 2023-03-09 PROCEDURE — 72156 MRI SPINE CERVICAL-THORACIC-LUMBAR W W/O CONTRAST (XPD): ICD-10-PCS | Mod: 26,,, | Performed by: RADIOLOGY

## 2023-03-09 PROCEDURE — 72157 MRI CHEST SPINE W/O & W/DYE: CPT | Mod: 26,,, | Performed by: RADIOLOGY

## 2023-03-09 PROCEDURE — A9585 GADOBUTROL INJECTION: HCPCS | Performed by: INTERNAL MEDICINE

## 2023-03-09 PROCEDURE — 72157 MRI SPINE CERVICAL-THORACIC-LUMBAR W W/O CONTRAST (XPD): ICD-10-PCS | Mod: 26,,, | Performed by: RADIOLOGY

## 2023-03-09 PROCEDURE — 25500020 PHARM REV CODE 255: Performed by: INTERNAL MEDICINE

## 2023-03-09 PROCEDURE — 72156 MRI NECK SPINE W/O & W/DYE: CPT | Mod: 26,,, | Performed by: RADIOLOGY

## 2023-03-09 PROCEDURE — 72157 MRI CHEST SPINE W/O & W/DYE: CPT | Mod: TC

## 2023-03-09 PROCEDURE — 72158 MRI SPINE CERVICAL-THORACIC-LUMBAR W W/O CONTRAST (XPD): ICD-10-PCS | Mod: 26,,, | Performed by: RADIOLOGY

## 2023-03-09 PROCEDURE — 72158 MRI LUMBAR SPINE W/O & W/DYE: CPT | Mod: TC

## 2023-03-09 RX ORDER — GADOBUTROL 604.72 MG/ML
9 INJECTION INTRAVENOUS
Status: COMPLETED | OUTPATIENT
Start: 2023-03-09 | End: 2023-03-09

## 2023-03-09 RX ADMIN — GADOBUTROL 9 ML: 604.72 INJECTION INTRAVENOUS at 03:03

## 2023-03-13 ENCOUNTER — NURSE TRIAGE (OUTPATIENT)
Dept: ADMINISTRATIVE | Facility: CLINIC | Age: 63
End: 2023-03-13
Payer: COMMERCIAL

## 2023-03-13 ENCOUNTER — OFFICE VISIT (OUTPATIENT)
Dept: URGENT CARE | Facility: CLINIC | Age: 63
End: 2023-03-13
Payer: COMMERCIAL

## 2023-03-13 VITALS
HEIGHT: 69 IN | BODY MASS INDEX: 25.92 KG/M2 | SYSTOLIC BLOOD PRESSURE: 155 MMHG | HEART RATE: 77 BPM | WEIGHT: 175 LBS | RESPIRATION RATE: 18 BRPM | DIASTOLIC BLOOD PRESSURE: 95 MMHG | TEMPERATURE: 98 F | OXYGEN SATURATION: 96 %

## 2023-03-13 DIAGNOSIS — Z76.89 ENCOUNTER TO ESTABLISH CARE: ICD-10-CM

## 2023-03-13 DIAGNOSIS — U07.1 COVID-19: Primary | ICD-10-CM

## 2023-03-13 DIAGNOSIS — U07.1 COVID-19 VIRUS DETECTED: ICD-10-CM

## 2023-03-13 DIAGNOSIS — R50.9 FEVER, UNSPECIFIED FEVER CAUSE: ICD-10-CM

## 2023-03-13 PROBLEM — R97.20 ELEVATED PSA: Status: ACTIVE | Noted: 2023-03-13

## 2023-03-13 PROBLEM — D80.9 IMMUNOGLOBULIN DEFICIENCY: Status: ACTIVE | Noted: 2023-03-13

## 2023-03-13 PROBLEM — H43.819 POSTERIOR VITREOUS DETACHMENT: Status: ACTIVE | Noted: 2023-03-13

## 2023-03-13 PROBLEM — K21.9 GERD WITHOUT ESOPHAGITIS: Status: ACTIVE | Noted: 2021-03-15

## 2023-03-13 PROBLEM — Z92.21 STATUS POST CHEMOTHERAPY: Status: ACTIVE | Noted: 2023-03-13

## 2023-03-13 PROBLEM — H43.819 VITREOUS DEGENERATION: Status: ACTIVE | Noted: 2023-03-13

## 2023-03-13 PROBLEM — I82.403 DVT, BILATERAL LOWER LIMBS: Status: ACTIVE | Noted: 2023-03-13

## 2023-03-13 PROBLEM — K40.90 LEFT INGUINAL HERNIA: Status: ACTIVE | Noted: 2023-03-13

## 2023-03-13 PROBLEM — R03.0 ELEVATED BLOOD PRESSURE READING: Status: ACTIVE | Noted: 2023-03-13

## 2023-03-13 PROBLEM — Z87.81 HISTORY OF VERTEBRAL COMPRESSION FRACTURE: Status: ACTIVE | Noted: 2021-03-15

## 2023-03-13 PROBLEM — I10 MILD HYPERTENSION: Status: ACTIVE | Noted: 2021-03-15

## 2023-03-13 LAB
CTP QC/QA: YES
SARS-COV-2 AG RESP QL IA.RAPID: POSITIVE

## 2023-03-13 PROCEDURE — 99214 OFFICE O/P EST MOD 30 MIN: CPT | Mod: S$GLB,,, | Performed by: FAMILY MEDICINE

## 2023-03-13 PROCEDURE — 87811 SARS-COV-2 COVID19 W/OPTIC: CPT | Mod: QW,S$GLB,, | Performed by: FAMILY MEDICINE

## 2023-03-13 PROCEDURE — 87811 SARS CORONAVIRUS 2 ANTIGEN POCT, MANUAL READ: ICD-10-PCS | Mod: QW,S$GLB,, | Performed by: FAMILY MEDICINE

## 2023-03-13 PROCEDURE — 99214 PR OFFICE/OUTPT VISIT, EST, LEVL IV, 30-39 MIN: ICD-10-PCS | Mod: S$GLB,,, | Performed by: FAMILY MEDICINE

## 2023-03-13 RX ORDER — NIRMATRELVIR AND RITONAVIR 300-100 MG
KIT ORAL
Qty: 30 TABLET | Refills: 0 | Status: SHIPPED | OUTPATIENT
Start: 2023-03-13 | End: 2023-03-18

## 2023-03-13 NOTE — TELEPHONE ENCOUNTER
Temp is 101.5 rt ear left 100.8. runny nose, sneezing, coughing. Covid test was negative yesterday. Symptoms started last week. Pt stated he is scheduled to travel. Flight leaves at 9am. Care advice recommend pt see Md within 4 hours. Pt verbalized understanding and stated he will take care of it.   Reason for Disposition   [1] Fever > 101 F (38.3 C) AND [2] age > 60 years    Additional Information   Negative: SEVERE difficulty breathing (e.g., struggling for each breath, speaks in single words)   Negative: Sounds like a life-threatening emergency to the triager   Negative: Fever > 104 F (40 C)   Negative: Patient sounds very sick or weak to the triager    Protocols used: Common Cold-A-AH

## 2023-03-13 NOTE — TELEPHONE ENCOUNTER
Discussed with EZEQUIEL Francis who is advising  now for complete respiratory panel.     Notified pt of the above. Pt denies SOB and CP. Pt reports fever began today. Pt is postponing flight due to illness. Pt reporting to  this AM and will call clinic if unable to obtain complete respiratory panel. Reviewed when to seek emergent care. Pt verbalized understanding.

## 2023-03-13 NOTE — PROGRESS NOTES
"Subjective:       Patient ID: Vicente Connors is a 62 y.o. male.    Vitals:  height is 5' 9" (1.753 m) and weight is 79.4 kg (175 lb). His oral temperature is 98.3 °F (36.8 °C). His blood pressure is 155/95 (abnormal) and his pulse is 77. His respiration is 18 and oxygen saturation is 96%.     Chief Complaint: Fever    Patient presents with c.o fever that started today. Tmax 102.0 F. Patient with congestion, cough, and runny nose 5 days before fever. Patient with at home neg covid test. He agrees to repeat the test today. Tylenol taken before coming has helped reduce temp.Please note that patient is currently undergoing chem for cancer treatment.           Fever   This is a new problem. The current episode started today. The problem occurs intermittently. The problem has been unchanged. Associated symptoms include congestion, coughing, headaches and muscle aches. He has tried acetaminophen for the symptoms. The treatment provided moderate relief.     Constitution: Positive for fever.   HENT:  Positive for congestion.    Respiratory:  Positive for cough.    Neurological:  Positive for headaches.     Objective:      Physical Exam   Constitutional: He is oriented to person, place, and time. He appears well-developed. He is cooperative.  Non-toxic appearance. He does not appear ill. No distress.   HENT:   Head: Normocephalic and atraumatic.   Ears:   Right Ear: Hearing, tympanic membrane, external ear and ear canal normal.   Left Ear: Hearing, tympanic membrane, external ear and ear canal normal.   Nose: Nose normal. No mucosal edema, rhinorrhea or nasal deformity. No epistaxis. Right sinus exhibits no maxillary sinus tenderness and no frontal sinus tenderness. Left sinus exhibits no maxillary sinus tenderness and no frontal sinus tenderness.   Mouth/Throat: Uvula is midline, oropharynx is clear and moist and mucous membranes are normal. Mucous membranes are moist. No trismus in the jaw. Normal dentition. No uvula " swelling. No oropharyngeal exudate, posterior oropharyngeal edema or posterior oropharyngeal erythema. Oropharynx is clear.   Eyes: Conjunctivae and lids are normal. No scleral icterus.   Neck: Trachea normal and phonation normal. Neck supple. No edema present. No erythema present. No neck rigidity present.   Cardiovascular: Normal rate, regular rhythm, normal heart sounds and normal pulses.   Pulmonary/Chest: Effort normal and breath sounds normal. No respiratory distress. He has no decreased breath sounds. He has no rhonchi.   Abdominal: Normal appearance.   Musculoskeletal: Normal range of motion.         General: No deformity. Normal range of motion.   Neurological: He is alert and oriented to person, place, and time. He exhibits normal muscle tone. Coordination normal.   Skin: Skin is warm, dry, intact, not diaphoretic and not pale.   Psychiatric: His speech is normal and behavior is normal. Judgment and thought content normal.   Nursing note and vitals reviewed.      Assessment:       1. COVID-19    2. Fever, unspecified fever cause    3. Encounter to establish care          Plan:         COVID-19  -     nirmatrelvir-ritonavir (PAXLOVID, EUA,) 300 mg (150 mg x 2)-100 mg copackaged tablets (EUA); Take 3 tablets by mouth 2 (two) times daily. Each dose contains 2 nirmatrelvir (pink tablets) and 1 ritonavir (white tablet). Take all 3 tablets together  Dispense: 30 tablet; Refill: 0    Fever, unspecified fever cause  -     SARS Coronavirus 2 Antigen, POCT Manual Read    Encounter to establish care  -     Ambulatory referral/consult to Internal Medicine    Pt or guardian provided educational materials and instructions regarding their visit diagnosis.

## 2023-03-20 ENCOUNTER — HOSPITAL ENCOUNTER (OUTPATIENT)
Dept: RADIOLOGY | Facility: OTHER | Age: 63
Discharge: HOME | End: 2023-03-20
Attending: INTERNAL MEDICINE
Payer: COMMERCIAL

## 2023-03-20 DIAGNOSIS — C90.01 MULTIPLE MYELOMA IN REMISSION: ICD-10-CM

## 2023-03-20 DIAGNOSIS — Z94.81 AUTOLOGOUS BONE MARROW TRANSPLANTATION STATUS: ICD-10-CM

## 2023-03-20 PROCEDURE — 25500020 PHARM REV CODE 255: Performed by: INTERNAL MEDICINE

## 2023-03-20 PROCEDURE — A9585 GADOBUTROL INJECTION: HCPCS | Performed by: INTERNAL MEDICINE

## 2023-03-20 PROCEDURE — 72197 MRI PELVIS W/O & W/DYE: CPT | Mod: 26,,, | Performed by: RADIOLOGY

## 2023-03-20 PROCEDURE — 72197 MRI PELVIS W/O & W/DYE: CPT | Mod: TC

## 2023-03-20 PROCEDURE — 72197 MRI PELVIS W WO CONTRAST: ICD-10-PCS | Mod: 26,,, | Performed by: RADIOLOGY

## 2023-03-20 RX ORDER — GADOBUTROL 604.72 MG/ML
7.5 INJECTION INTRAVENOUS
Status: COMPLETED | OUTPATIENT
Start: 2023-03-20 | End: 2023-03-20

## 2023-03-20 RX ADMIN — GADOBUTROL 7.5 ML: 604.72 INJECTION INTRAVENOUS at 07:03

## 2023-03-23 ENCOUNTER — PATIENT MESSAGE (OUTPATIENT)
Dept: HEMATOLOGY/ONCOLOGY | Facility: CLINIC | Age: 63
End: 2023-03-23
Payer: COMMERCIAL

## 2023-03-31 ENCOUNTER — INFUSION (OUTPATIENT)
Dept: INFUSION THERAPY | Facility: HOSPITAL | Age: 63
End: 2023-03-31
Attending: INTERNAL MEDICINE
Payer: COMMERCIAL

## 2023-03-31 ENCOUNTER — OFFICE VISIT (OUTPATIENT)
Dept: HEMATOLOGY/ONCOLOGY | Facility: CLINIC | Age: 63
End: 2023-03-31
Payer: COMMERCIAL

## 2023-03-31 ENCOUNTER — LAB VISIT (OUTPATIENT)
Dept: LAB | Facility: HOSPITAL | Age: 63
End: 2023-03-31
Payer: COMMERCIAL

## 2023-03-31 VITALS
OXYGEN SATURATION: 97 % | TEMPERATURE: 98 F | HEART RATE: 67 BPM | WEIGHT: 181.13 LBS | HEIGHT: 69 IN | SYSTOLIC BLOOD PRESSURE: 155 MMHG | DIASTOLIC BLOOD PRESSURE: 86 MMHG | BODY MASS INDEX: 26.83 KG/M2 | RESPIRATION RATE: 18 BRPM

## 2023-03-31 DIAGNOSIS — C90.01 MULTIPLE MYELOMA IN REMISSION: ICD-10-CM

## 2023-03-31 DIAGNOSIS — D61.818 PANCYTOPENIA: ICD-10-CM

## 2023-03-31 DIAGNOSIS — D80.9 IMMUNOGLOBULIN DEFICIENCY: ICD-10-CM

## 2023-03-31 DIAGNOSIS — Z94.81 AUTOLOGOUS BONE MARROW TRANSPLANTATION STATUS: ICD-10-CM

## 2023-03-31 DIAGNOSIS — C90.01 MULTIPLE MYELOMA IN REMISSION: Primary | ICD-10-CM

## 2023-03-31 DIAGNOSIS — I10 MILD HYPERTENSION: Primary | ICD-10-CM

## 2023-03-31 DIAGNOSIS — G62.9 PERIPHERAL POLYNEUROPATHY: ICD-10-CM

## 2023-03-31 LAB
ALBUMIN SERPL BCP-MCNC: 3.6 G/DL (ref 3.5–5.2)
ALP SERPL-CCNC: 63 U/L (ref 55–135)
ALT SERPL W/O P-5'-P-CCNC: 31 U/L (ref 10–44)
ANION GAP SERPL CALC-SCNC: 6 MMOL/L (ref 8–16)
AST SERPL-CCNC: 29 U/L (ref 10–40)
BASOPHILS # BLD AUTO: 0.02 K/UL (ref 0–0.2)
BASOPHILS NFR BLD: 0.6 % (ref 0–1.9)
BILIRUB SERPL-MCNC: 1 MG/DL (ref 0.1–1)
BUN SERPL-MCNC: 15 MG/DL (ref 8–23)
CALCIUM SERPL-MCNC: 9.3 MG/DL (ref 8.7–10.5)
CHLORIDE SERPL-SCNC: 106 MMOL/L (ref 95–110)
CO2 SERPL-SCNC: 27 MMOL/L (ref 23–29)
CREAT SERPL-MCNC: 1 MG/DL (ref 0.5–1.4)
DIFFERENTIAL METHOD: ABNORMAL
EOSINOPHIL # BLD AUTO: 0.1 K/UL (ref 0–0.5)
EOSINOPHIL NFR BLD: 2 % (ref 0–8)
ERYTHROCYTE [DISTWIDTH] IN BLOOD BY AUTOMATED COUNT: 14.7 % (ref 11.5–14.5)
EST. GFR  (NO RACE VARIABLE): >60 ML/MIN/1.73 M^2
GLUCOSE SERPL-MCNC: 99 MG/DL (ref 70–110)
HCT VFR BLD AUTO: 39.4 % (ref 40–54)
HGB BLD-MCNC: 12.9 G/DL (ref 14–18)
IGA SERPL-MCNC: 224 MG/DL (ref 40–350)
IGG SERPL-MCNC: 867 MG/DL (ref 650–1600)
IGM SERPL-MCNC: 52 MG/DL (ref 50–300)
IMM GRANULOCYTES # BLD AUTO: 0.01 K/UL (ref 0–0.04)
IMM GRANULOCYTES NFR BLD AUTO: 0.3 % (ref 0–0.5)
LYMPHOCYTES # BLD AUTO: 1.8 K/UL (ref 1–4.8)
LYMPHOCYTES NFR BLD: 50.1 % (ref 18–48)
MCH RBC QN AUTO: 31.2 PG (ref 27–31)
MCHC RBC AUTO-ENTMCNC: 32.7 G/DL (ref 32–36)
MCV RBC AUTO: 95 FL (ref 82–98)
MONOCYTES # BLD AUTO: 0.5 K/UL (ref 0.3–1)
MONOCYTES NFR BLD: 12.6 % (ref 4–15)
NEUTROPHILS # BLD AUTO: 1.2 K/UL (ref 1.8–7.7)
NEUTROPHILS NFR BLD: 34.4 % (ref 38–73)
NRBC BLD-RTO: 0 /100 WBC
PLATELET # BLD AUTO: 128 K/UL (ref 150–450)
PMV BLD AUTO: 9.7 FL (ref 9.2–12.9)
POTASSIUM SERPL-SCNC: 4.1 MMOL/L (ref 3.5–5.1)
PROT SERPL-MCNC: 6.5 G/DL (ref 6–8.4)
RBC # BLD AUTO: 4.13 M/UL (ref 4.6–6.2)
SODIUM SERPL-SCNC: 139 MMOL/L (ref 136–145)
WBC # BLD AUTO: 3.57 K/UL (ref 3.9–12.7)

## 2023-03-31 PROCEDURE — 85025 COMPLETE CBC W/AUTO DIFF WBC: CPT | Performed by: INTERNAL MEDICINE

## 2023-03-31 PROCEDURE — 99999 PR PBB SHADOW E&M-EST. PATIENT-LVL IV: ICD-10-PCS | Mod: PBBFAC,,, | Performed by: INTERNAL MEDICINE

## 2023-03-31 PROCEDURE — 86334 PATHOLOGIST INTERPRETATION IFE: ICD-10-PCS | Mod: 26,,, | Performed by: PATHOLOGY

## 2023-03-31 PROCEDURE — 3079F PR MOST RECENT DIASTOLIC BLOOD PRESSURE 80-89 MM HG: ICD-10-PCS | Mod: CPTII,S$GLB,, | Performed by: INTERNAL MEDICINE

## 2023-03-31 PROCEDURE — 80053 COMPREHEN METABOLIC PANEL: CPT | Performed by: INTERNAL MEDICINE

## 2023-03-31 PROCEDURE — 3008F BODY MASS INDEX DOCD: CPT | Mod: CPTII,S$GLB,, | Performed by: INTERNAL MEDICINE

## 2023-03-31 PROCEDURE — 99215 PR OFFICE/OUTPT VISIT, EST, LEVL V, 40-54 MIN: ICD-10-PCS | Mod: S$GLB,,, | Performed by: INTERNAL MEDICINE

## 2023-03-31 PROCEDURE — 3077F SYST BP >= 140 MM HG: CPT | Mod: CPTII,S$GLB,, | Performed by: INTERNAL MEDICINE

## 2023-03-31 PROCEDURE — 83521 IG LIGHT CHAINS FREE EACH: CPT | Mod: 59 | Performed by: INTERNAL MEDICINE

## 2023-03-31 PROCEDURE — 84165 PATHOLOGIST INTERPRETATION SPE: ICD-10-PCS | Mod: 26,,, | Performed by: PATHOLOGY

## 2023-03-31 PROCEDURE — 99999 PR PBB SHADOW E&M-EST. PATIENT-LVL IV: CPT | Mod: PBBFAC,,, | Performed by: INTERNAL MEDICINE

## 2023-03-31 PROCEDURE — 84165 PROTEIN E-PHORESIS SERUM: CPT | Performed by: INTERNAL MEDICINE

## 2023-03-31 PROCEDURE — 3079F DIAST BP 80-89 MM HG: CPT | Mod: CPTII,S$GLB,, | Performed by: INTERNAL MEDICINE

## 2023-03-31 PROCEDURE — 3077F PR MOST RECENT SYSTOLIC BLOOD PRESSURE >= 140 MM HG: ICD-10-PCS | Mod: CPTII,S$GLB,, | Performed by: INTERNAL MEDICINE

## 2023-03-31 PROCEDURE — 96401 CHEMO ANTI-NEOPL SQ/IM: CPT

## 2023-03-31 PROCEDURE — 1159F MED LIST DOCD IN RCRD: CPT | Mod: CPTII,S$GLB,, | Performed by: INTERNAL MEDICINE

## 2023-03-31 PROCEDURE — 99215 OFFICE O/P EST HI 40 MIN: CPT | Mod: S$GLB,,, | Performed by: INTERNAL MEDICINE

## 2023-03-31 PROCEDURE — 86334 IMMUNOFIX E-PHORESIS SERUM: CPT | Performed by: INTERNAL MEDICINE

## 2023-03-31 PROCEDURE — 86334 IMMUNOFIX E-PHORESIS SERUM: CPT | Mod: 26,,, | Performed by: PATHOLOGY

## 2023-03-31 PROCEDURE — 63600175 PHARM REV CODE 636 W HCPCS: Mod: JZ,TB,JG | Performed by: INTERNAL MEDICINE

## 2023-03-31 PROCEDURE — 36415 COLL VENOUS BLD VENIPUNCTURE: CPT | Performed by: INTERNAL MEDICINE

## 2023-03-31 PROCEDURE — 84165 PROTEIN E-PHORESIS SERUM: CPT | Mod: 26,,, | Performed by: PATHOLOGY

## 2023-03-31 PROCEDURE — 1159F PR MEDICATION LIST DOCUMENTED IN MEDICAL RECORD: ICD-10-PCS | Mod: CPTII,S$GLB,, | Performed by: INTERNAL MEDICINE

## 2023-03-31 PROCEDURE — 82784 ASSAY IGA/IGD/IGG/IGM EACH: CPT | Performed by: INTERNAL MEDICINE

## 2023-03-31 PROCEDURE — 3008F PR BODY MASS INDEX (BMI) DOCUMENTED: ICD-10-PCS | Mod: CPTII,S$GLB,, | Performed by: INTERNAL MEDICINE

## 2023-03-31 RX ORDER — HEPARIN 100 UNIT/ML
500 SYRINGE INTRAVENOUS
Status: CANCELLED | OUTPATIENT
Start: 2023-03-31

## 2023-03-31 RX ORDER — BORTEZOMIB 3.5 MG/1
1.3 INJECTION, POWDER, LYOPHILIZED, FOR SOLUTION INTRAVENOUS; SUBCUTANEOUS
Status: COMPLETED | OUTPATIENT
Start: 2023-03-31 | End: 2023-03-31

## 2023-03-31 RX ORDER — BORTEZOMIB 3.5 MG/1
1.3 INJECTION, POWDER, LYOPHILIZED, FOR SOLUTION INTRAVENOUS; SUBCUTANEOUS
Status: CANCELLED | OUTPATIENT
Start: 2023-03-31

## 2023-03-31 RX ORDER — LENALIDOMIDE 10 MG/1
1 CAPSULE ORAL SEE ADMIN INSTRUCTIONS
Qty: 21 EACH | Refills: 0 | Status: SHIPPED | OUTPATIENT
Start: 2023-03-31 | End: 2023-06-02

## 2023-03-31 RX ORDER — SODIUM CHLORIDE 0.9 % (FLUSH) 0.9 %
10 SYRINGE (ML) INJECTION
Status: CANCELLED | OUTPATIENT
Start: 2023-03-31

## 2023-03-31 RX ADMIN — BORTEZOMIB 2.5 MG: 3.5 INJECTION, POWDER, LYOPHILIZED, FOR SOLUTION INTRAVENOUS; SUBCUTANEOUS at 10:03

## 2023-03-31 NOTE — PROGRESS NOTES
Chief Complaint : Multiple myeloma, s/p tandem auto SCT, on VRd maintenance, hematology follow up      History of Present Illness : Vicente Connors is a 62 y.o. male here for hematology follow up. He has recently moved to [place] from [place]. He has history of stage IIIA, [employer] stage II IgA kappa multiple myeloma, which is likely intermediate-risk based upon the presence of t(4;14) with hyperdiploidy, based on records available through care everywhere.    IgA Kappa multiple Myeloma was diagnosed in 2014.   He was started on velcade, revlimid and dexamethasone at diagnosis.   Of note, he developed a popliteal DVT and was started on Lovenox and later switched Xarelto.   He completed a stem cell transplant with Dr Schuster in May 2015. He is s/p  tandem autologous transplant in 2015 and has been on triple maintenance with bortezomib, lenalidomide and dexamethasone since then.   He also has peripheral neuropathy, h/o LE DVT on chronic anti-coagulation, GERD, vertebral fracture. He has history of DVT and remains on chronic anticoagulation.  He has had multiple skeletal complications since his original diagnosis.      Interval History: He is here for a follow up visit. He had prolonged bout of diarrhea in January 2023. All stool studies were negative/ unremarkable.  H ehad COVID 19 infection in March 2023. He received paxlovid. He had MRI spine and hips.     Review of patient's allergies indicates:  No Known Allergies        Current Outpatient Medications   Medication Sig    acyclovir (ZOVIRAX) 400 MG tablet Take 1 tablet (400 mg total) by mouth 2 (two) times daily.    bortezomib (VELCADE INJ) Inject as directed. Pt gets every month    calcium carb/vit D3/minerals (CALCIUM-VITAMIN D ORAL)     dexAMETHasone (DECADRON) 4 MG Tab TAKE 10 TABLETS (40 MG DOSE) BY MOUTH DAY OF AND DAY AFTER VELCADE once monthly    folic acid-vit B6-vit B12 2.5-25-2 mg (FOLBIC) 2.5-25-2 mg Tab Take 1 tablet by mouth once  daily.    ibuprofen (ADVIL,MOTRIN) 600 MG tablet PRN    Lactobacillus acidophilus (PROBIOTIC ORAL) Take by mouth. 24 billion CFU    lenalidomide (REVLIMID) 10 mg Cap Take 1 capsule by mouth As instructed (take 1 capsule on days 1-21 of a 28 day cycle).    magnesium oxide 500 mg Tab once daily.    omeprazole (PRILOSEC) 20 MG capsule     rivaroxaban (XARELTO) 15 mg Tab Take 1 tablet (15 mg total) by mouth once daily.    sulfamethoxazole-trimethoprim 800-160mg (BACTRIM DS) 800-160 mg Tab TAKE 1 TABLET EVERY MONDAY, WEDNESDAY AND FRIDAY    zoledronic acid (ZOMETA IV) Inject into the vein. Pt gets every 3 months     No current facility-administered medications for this visit.      Review of Systems   Constitutional:  Positive for malaise/fatigue. Negative for chills, fever and weight loss.   HENT:  Negative for congestion, ear pain and sinus pain.    Eyes:  Negative for double vision, photophobia and pain.   Respiratory:  Negative for sputum production and stridor.    Cardiovascular:  Negative for chest pain, palpitations, orthopnea and claudication.   Gastrointestinal:  Negative for blood in stool, constipation, diarrhea, melena and nausea.   Genitourinary:  Negative for dysuria, frequency and urgency.   Musculoskeletal:  Negative for myalgias and neck pain.   Neurological:  Positive for tingling. Negative for dizziness, tremors and speech change.   Endo/Heme/Allergies:  Negative for environmental allergies. Does not bruise/bleed easily.   Psychiatric/Behavioral:  Negative for substance abuse and suicidal ideas.       Vitals:    03/31/23 0910   BP: (!) 155/86   Pulse: 67   Resp: 18   Temp: 97.8 °F (36.6 °C)         PS: ECOG 1    Physical Exam  HENT:      Head: Normocephalic and atraumatic.      Mouth/Throat:      Pharynx: No posterior oropharyngeal erythema.   Eyes:      General: No scleral icterus.  Cardiovascular:      Rate and Rhythm: Normal rate and regular rhythm.   Pulmonary:      Effort: Pulmonary effort is  normal. No respiratory distress.      Breath sounds: Normal breath sounds.   Abdominal:      General: There is no distension.      Palpations: There is no mass.      Tenderness: There is no abdominal tenderness.   Musculoskeletal:         General: No swelling.   Lymphadenopathy:      Cervical: No cervical adenopathy.   Neurological:      General: No focal deficit present.      Mental Status: He is alert and oriented to person, place, and time.      Cranial Nerves: No cranial nerve deficit.            1/9/2021 MRI Pelvis    IMPRESSION:   DIFFUSE PELVIC, PROXIMAL FEMORAL AND LOWER LUMBAR HETEROGENEOUS MARROW SIGNAL ABNORMALITY COULD RELATE TO HEMATOPOIETIC RED MARROW RECONVERSION. INFILTRATIVE MYELOMA CANNOT BE EXCLUDED. THERE IS A MORE DISCRETE 1.2 CM ENHANCING LESION IN THE LEFT FEMORAL HEAD THAT COULD ALSO REPRESENT AN ISLAND OF HEMATOPOIETIC RED MARROW. HOWEVER, CONTINUED ATTENTION ON FOLLOW-UP IS RECOMMENDED.    1/9/2021 MRI Lumbar spine/THoracic/Cervical  IMPRESSION:     1. There is interval increase in T2/STIR hyperintensity with enhancement extending from the left pedicle into the articular pillar and left transverse process of the T1 vertebra, although there is no definite decrease in the intrinsic T1 hypointense signal in this location. This may also be a degenerative process, although focal myeloma is not definitely excluded. Clinical correlation is recommended, and follow-up is suggested on future examinations.      2. There has been interval increase in STIR hyperintensity and enhancement along the posterior endplates at the T1-T2 level, most likely progressive degenerative endplate marrow signal changes.     3. No other focal suspicious STIR hyperintense enhancing signal abnormalities are identified.     4. Stable multilevel compression fracture deformities      11/25/2020 Bone survey    IMPRESSION:   1. No lytic or blastic lesions are noted.      2. Stable compression fractures, as above.     3. Chronic  changes, as above.       10/19/22 SPEP: Normal total protein, normal pattern.   10/19/22 sIFE: No monoclonal peaks identified.   Component      Latest Ref Rng & Units 2/2/2023   [place] Free Light Chains      0.33 - 1.94 mg/dL 1.88   Lambda Free Light Chains      0.57 - 2.63 mg/dL 1.08   Kappa/Lambda FLC Ratio      0.26 - 1.65 1.74 (H)       Component      Latest Ref Rng & Units 3/2/2023   [place] Free Light Chains      0.33 - 1.94 mg/dL 1.79   Lambda Free Light Chains      0.57 - 2.63 mg/dL 1.18   Kappa/Lambda FLC Ratio      0.26 - 1.65 1.52     3/2/23 SPEP: Normal total protein, normal pattern  3/2/23 sIFE: No monoclonal peaks identified.         3/9/23 MRI spine    FINDINGS:  Cervical spine:     Alignment: Normal.     Vertebrae: No fracture or marrow replacement process.     Discs: Multilevel mild disc height loss and desiccation.     Cord: Normal cord signal.     Craniocervical junction: Unremarkable.     C1-C2: Unremarkable.     Degenerative findings:     C2-C3: Small posterior disc osteophyte complex and bilateral facet arthropathy resulting in mild bilateral neural foraminal narrowing.  No significant spinal canal stenosis.     C3-C4: Posterior disc osteophyte complex and bilateral facet arthropathy resulting in moderate left and mild right neural foraminal narrowing.  Mild effacement of the anterior thecal sac without significant spinal canal stenosis.     C4-C5: Posterior disc osteophyte complex and bilateral facet arthropathy resulting in moderate predominantly left neural foraminal narrowing.  Effacement of the anterior thecal sac consistent with mild spinal canal stenosis.     C5-C6: Posterior disc osteophyte complex and bilateral uncovertebral spurring resulting in moderate bilateral neural foraminal narrowing.  Effacement of the anterior thecal sac consistent with mild spinal canal stenosis.     C6-C7: Posterior disc osteophyte complex and bilateral uncovertebral spurring findings result in moderate  bilateral neural foraminal narrowing.  Effacement of the anterior thecal sac consistent with mild spinal canal stenosis.     C7-T1: No spinal canal stenosis or neural foraminal narrowing.     Paraspinal muscles & soft tissues: Normal.     Enhancement: No abnormal areas of enhancement.     Thoracic Spine:     Alignment: Normal.     Vertebrae: No infiltrative marrow replacing process.  T2 hyperintense lesions consistent with probable hemangiomas of the anterior vertebral body of T4 and posterior and mid vertebral body of T7.  Mild height loss of the superior endplate of T8 and T11.     Discs: Multilevel mild disc height loss and desiccation.     Cord: Normal contour and signal.     Degenerative findings: No significant spinal canal stenosis or neural foraminal narrowing.     Soft tissue: Unremarkable.     Enhancement: No abnormal areas of enhancement.     Lumbar spine:     Alignment: Normal.     Vertebrae: No infiltrative marrow replacing process.  Compression fracture of L1 with retropulsion of posterior fragment approximately 0.6 cm which abuts the conus medullaris without causing deformity or signal abnormality.     Discs: Normal height and signal.     Cord: Multilevel disc height loss and desiccation most pronounced at T12-L1, L1-L2, and L4-L5.     Degenerative findings:     T12-L1: Central disc protrusion.  No spinal canal stenosis or neural foraminal narrowing.     L1-L2: Circumferential disc bulge resulting in near complete effacement of the bilateral perineural fat, right greater than left, consistent with moderate bilateral neural foraminal narrowing.  No significant spinal canal stenosis.     L2-L3: Circumferential disc bulge, bilateral facet arthropathy, and ligamentum flavum buckling findings result in partial effacement of the right perineural fat consistent with moderate right neural foraminal narrowing.  There is lateral effacement of the thecal sac and crowding of the cauda equina nerves with minimal  residual CSF, consistent with moderate spinal canal stenosis.     L3-L4: Circumferential disc bulge, bilateral facet arthropathy, and ligamentum flavum buckling.  No spinal canal stenosis or neural foraminal narrowing.     L4-L5: Circumferential disc bulge, bilateral facet arthropathy, and ligamentum flavum buckling, findings result in minimal effacement of the perineural fat, consistent with mild bilateral neural foraminal narrowing.  Mild effacement of the right lateral thecal sac.     L5-S1: Bilateral facet arthropathy.  No spinal canal stenosis or neural foraminal narrowing.     Upper SI joints/sacrum: Unremarkable.     Soft tissue: Unremarkable.     Enhancement: No abnormal areas of enhancement.     Impression:     1. No findings concerning for multiple myeloma lesion.  2. Compression fracture of L1 with retropulsion of the posterior fragment.  3. Additional minimal compression fractures of the superior endplates of T8 and T11.  4. Degenerative changes of the cervical, thoracic, and lumbar spine, as detailed above.    3/20/23 MRI pelvis      FINDINGS:  No masses or fluid collections. No pelvic ascites or lymphadenopathy.     Visualized bowel loops are unremarkable.  Urinary bladder is decompressed, noting circumferential wall thickening.  Prostate demonstrates heterogeneous signal, likely BPH.     Regional muscles and tendons are unremarkable.     Bone marrow signal is maintained. No fracture, focal lesion or marrow infiltrative process.     Note made of facet arthropathy in the lower lumbar spine.  Hip joints exhibit bilateral chondral thinning with labral fraying and osteophyte production.     Postoperative changes suggesting prior bilateral herniorrhaphy.     Impression:     1. No focal marrow lesion or diffuse infiltrative process.  2. Additional findings detailed above.      Component      Latest Ref Rng & Units 3/31/2023   WBC      3.90 - 12.70 K/uL 3.57 (L)   RBC      4.60 - 6.20 M/uL 4.13 (L)    Hemoglobin      14.0 - 18.0 g/dL 12.9 (L)   Hematocrit      40.0 - 54.0 % 39.4 (L)   MCV      82 - 98 fL 95   MCH      27.0 - 31.0 pg 31.2 (H)   MCHC      32.0 - 36.0 g/dL 32.7   RDW      11.5 - 14.5 % 14.7 (H)   Platelets      150 - 450 K/uL 128 (L)   MPV      9.2 - 12.9 fL 9.7   Immature Granulocytes      0.0 - 0.5 % 0.3   Gran # (ANC)      1.8 - 7.7 K/uL 1.2 (L)   Immature Grans (Abs)      0.00 - 0.04 K/uL 0.01   Lymph #      1.0 - 4.8 K/uL 1.8   Mono #      0.3 - 1.0 K/uL 0.5   Eos #      0.0 - 0.5 K/uL 0.1   Baso #      0.00 - 0.20 K/uL 0.02   nRBC      0 /100 WBC 0   Gran %      38.0 - 73.0 % 34.4 (L)   Lymph %      18.0 - 48.0 % 50.1 (H)   Mono %      4.0 - 15.0 % 12.6   Eosinophil %      0.0 - 8.0 % 2.0   Basophil %      0.0 - 1.9 % 0.6   Differential Method       Automated   Sodium      136 - 145 mmol/L 139   Potassium      3.5 - 5.1 mmol/L 4.1   Chloride      95 - 110 mmol/L 106   CO2      23 - 29 mmol/L 27   Glucose      70 - 110 mg/dL 99   BUN      8 - 23 mg/dL 15   Creatinine      0.5 - 1.4 mg/dL 1.0   Calcium      8.7 - 10.5 mg/dL 9.3   PROTEIN TOTAL      6.0 - 8.4 g/dL 6.5   Albumin      3.5 - 5.2 g/dL 3.6   BILIRUBIN TOTAL      0.1 - 1.0 mg/dL 1.0   Alkaline Phosphatase      55 - 135 U/L 63   AST      10 - 40 U/L 29   ALT      10 - 44 U/L 31   Anion Gap      8 - 16 mmol/L 6 (L)   eGFR      >60 mL/min/1.73 m:2 >60.0   IgG      650 - 1600 mg/dL 867   IgA      40 - 350 mg/dL 224   IgM      50 - 300 mg/dL 52           Assessment:    1. IgA kappa multiple myeloma in remission     -Vicente Connors is a 62 y.o.-year-old male with history of stage IIIA, ISS II IgA kappa multiple myeloma, which is likely intermediate-risk based upon the presence of t(4;14) with hyperdiploidy   -S/ p  tandem auto stem cell transplant in 2015 with a good biochemical remission  -Now on triple maintenance: velcade 1.3 mg/m2 sub q monthly, dex 40 mg PO day of and day after monthly velcade , revlimid 10 mg  PO for 21 days on/ 7  days off every 28 days  --Repeat 24 hour urine completed 4/2022  -No monoclonal protein on SPEP/sIFE done on 3/2/23; sFLC ratio normal  --He has been getting MRI spine every 2 years  -No lytic or enhancing lesions identified on MRI whole spine done on 3/9/23  -MRI pelvis on 3/20/23 also negative for enhancing/ lytic lesion      2. Pancytopenia    --grade 1  -likely secondary to chemotherapy      3. Low Back Pain: chronic and unchanged; not on any analgesic at this time    --Spine MRI done 1/21/21, result detailed above under imaging    4. History of LE DVT:     --Continue Xarelto   --Take with largest meal of the day  --Notify office of any scheduled procedure/surgery as Xarelto will need to be interrupted   -no bleeding diathesis reported    5. Infectious disease      --Continue prophylactic bactrim and acyclovir   --Tested for measles and ok no booster recommended  --Additional tdep recommended,  last in 2011  --completed Hep B series  --recommend annual flu vaccine, COVID 19 booster      6. Bone health:     --MRI spine1/21 neg   --Repeat 2023   --Last Zometa given 3/2/23, q3 months    7. Diarrhea    --now improved  -stool studies neagtive in jan 2023    8. Health Maintenance:     --Colonoscopy 2011 and 2017, repeat 2022   --PSA  up to date   --covid vaccine 3/31 pfizer booster done  --Got second booster 2/17/22          BMT Chart Routing      Follow up with physician    Follow up with [facility]    Provider visit type    Infusion scheduling note zometa on 5/25   Injection scheduling note velcade today, and in 5 weeks (5/5/23) at [facility]; and on 6/2/23   Labs CBC, CMP, SPEP, free light chains, immunofixation and phosphorus   Scheduling:  Preferred lab:  Lab interval:  cbc, cmp, spep. light chains, phos, immuniofixatiion every 4 weeks at [facility]   Imaging    Pharmacy appointment    Other referrals

## 2023-04-03 LAB
ALBUMIN SERPL ELPH-MCNC: 3.66 G/DL (ref 3.35–5.55)
ALPHA1 GLOB SERPL ELPH-MCNC: 0.27 G/DL (ref 0.17–0.41)
ALPHA2 GLOB SERPL ELPH-MCNC: 0.57 G/DL (ref 0.43–0.99)
B-GLOBULIN SERPL ELPH-MCNC: 0.76 G/DL (ref 0.5–1.1)
GAMMA GLOB SERPL ELPH-MCNC: 0.84 G/DL (ref 0.67–1.58)
INTERPRETATION SERPL IFE-IMP: NORMAL
KAPPA LC SER QL IA: 2.12 MG/DL (ref 0.33–1.94)
KAPPA LC/LAMBDA SER IA: 1.53 (ref 0.26–1.65)
LAMBDA LC SER QL IA: 1.39 MG/DL (ref 0.57–2.63)
PATHOLOGIST INTERPRETATION IFE: NORMAL
PATHOLOGIST INTERPRETATION SPE: NORMAL
PROT SERPL-MCNC: 6.1 G/DL (ref 6–8.4)

## 2023-04-19 ENCOUNTER — PATIENT MESSAGE (OUTPATIENT)
Dept: HEMATOLOGY/ONCOLOGY | Facility: CLINIC | Age: 63
End: 2023-04-19
Payer: COMMERCIAL

## 2023-04-20 DIAGNOSIS — C90.01 MULTIPLE MYELOMA IN REMISSION: ICD-10-CM

## 2023-04-20 RX ORDER — LENALIDOMIDE 10 MG/1
1 CAPSULE ORAL SEE ADMIN INSTRUCTIONS
Qty: 21 EACH | Refills: 0 | Status: SHIPPED | OUTPATIENT
Start: 2023-04-20 | End: 2023-05-23 | Stop reason: SDUPTHER

## 2023-05-05 ENCOUNTER — INFUSION (OUTPATIENT)
Dept: INFUSION THERAPY | Facility: OTHER | Age: 63
End: 2023-05-05
Attending: INTERNAL MEDICINE
Payer: COMMERCIAL

## 2023-05-05 ENCOUNTER — LAB VISIT (OUTPATIENT)
Dept: LAB | Facility: OTHER | Age: 63
End: 2023-05-05
Attending: INTERNAL MEDICINE
Payer: COMMERCIAL

## 2023-05-05 VITALS
HEIGHT: 69 IN | SYSTOLIC BLOOD PRESSURE: 151 MMHG | WEIGHT: 182.13 LBS | DIASTOLIC BLOOD PRESSURE: 72 MMHG | TEMPERATURE: 98 F | BODY MASS INDEX: 26.97 KG/M2 | RESPIRATION RATE: 16 BRPM | OXYGEN SATURATION: 98 % | HEART RATE: 68 BPM

## 2023-05-05 DIAGNOSIS — C90.01 MULTIPLE MYELOMA IN REMISSION: Primary | ICD-10-CM

## 2023-05-05 DIAGNOSIS — C90.01 MULTIPLE MYELOMA IN REMISSION: ICD-10-CM

## 2023-05-05 DIAGNOSIS — Z94.81 AUTOLOGOUS BONE MARROW TRANSPLANTATION STATUS: ICD-10-CM

## 2023-05-05 DIAGNOSIS — D61.818 PANCYTOPENIA: ICD-10-CM

## 2023-05-05 LAB
ALBUMIN SERPL BCP-MCNC: 3.5 G/DL (ref 3.5–5.2)
ALP SERPL-CCNC: 51 U/L (ref 55–135)
ALT SERPL W/O P-5'-P-CCNC: 27 U/L (ref 10–44)
ANION GAP SERPL CALC-SCNC: 7 MMOL/L (ref 8–16)
AST SERPL-CCNC: 27 U/L (ref 10–40)
BASOPHILS # BLD AUTO: 0.04 K/UL (ref 0–0.2)
BASOPHILS NFR BLD: 1.4 % (ref 0–1.9)
BILIRUB SERPL-MCNC: 1.6 MG/DL (ref 0.1–1)
BUN SERPL-MCNC: 12 MG/DL (ref 8–23)
CALCIUM SERPL-MCNC: 8.6 MG/DL (ref 8.7–10.5)
CHLORIDE SERPL-SCNC: 110 MMOL/L (ref 95–110)
CO2 SERPL-SCNC: 22 MMOL/L (ref 23–29)
CREAT SERPL-MCNC: 0.8 MG/DL (ref 0.5–1.4)
DIFFERENTIAL METHOD: ABNORMAL
EOSINOPHIL # BLD AUTO: 0 K/UL (ref 0–0.5)
EOSINOPHIL NFR BLD: 1.4 % (ref 0–8)
ERYTHROCYTE [DISTWIDTH] IN BLOOD BY AUTOMATED COUNT: 15.3 % (ref 11.5–14.5)
EST. GFR  (NO RACE VARIABLE): >60 ML/MIN/1.73 M^2
GLUCOSE SERPL-MCNC: 104 MG/DL (ref 70–110)
HCT VFR BLD AUTO: 38 % (ref 40–54)
HGB BLD-MCNC: 12.6 G/DL (ref 14–18)
IMM GRANULOCYTES # BLD AUTO: 0.01 K/UL (ref 0–0.04)
IMM GRANULOCYTES NFR BLD AUTO: 0.4 % (ref 0–0.5)
LYMPHOCYTES # BLD AUTO: 1.3 K/UL (ref 1–4.8)
LYMPHOCYTES NFR BLD: 46.2 % (ref 18–48)
MCH RBC QN AUTO: 31.6 PG (ref 27–31)
MCHC RBC AUTO-ENTMCNC: 33.2 G/DL (ref 32–36)
MCV RBC AUTO: 95 FL (ref 82–98)
MONOCYTES # BLD AUTO: 0.4 K/UL (ref 0.3–1)
MONOCYTES NFR BLD: 14.7 % (ref 4–15)
NEUTROPHILS # BLD AUTO: 1 K/UL (ref 1.8–7.7)
NEUTROPHILS NFR BLD: 35.9 % (ref 38–73)
NRBC BLD-RTO: 0 /100 WBC
PHOSPHATE SERPL-MCNC: 1.9 MG/DL (ref 2.7–4.5)
PLATELET # BLD AUTO: 140 K/UL (ref 150–450)
PMV BLD AUTO: 9.5 FL (ref 9.2–12.9)
POTASSIUM SERPL-SCNC: 3.9 MMOL/L (ref 3.5–5.1)
PROT SERPL-MCNC: 6.2 G/DL (ref 6–8.4)
RBC # BLD AUTO: 3.99 M/UL (ref 4.6–6.2)
SODIUM SERPL-SCNC: 139 MMOL/L (ref 136–145)
WBC # BLD AUTO: 2.79 K/UL (ref 3.9–12.7)

## 2023-05-05 PROCEDURE — 36415 COLL VENOUS BLD VENIPUNCTURE: CPT | Performed by: INTERNAL MEDICINE

## 2023-05-05 PROCEDURE — 96401 CHEMO ANTI-NEOPL SQ/IM: CPT

## 2023-05-05 PROCEDURE — 63600175 PHARM REV CODE 636 W HCPCS: Mod: JW,JG | Performed by: INTERNAL MEDICINE

## 2023-05-05 PROCEDURE — 84100 ASSAY OF PHOSPHORUS: CPT | Performed by: INTERNAL MEDICINE

## 2023-05-05 PROCEDURE — 80053 COMPREHEN METABOLIC PANEL: CPT | Performed by: INTERNAL MEDICINE

## 2023-05-05 PROCEDURE — 85025 COMPLETE CBC W/AUTO DIFF WBC: CPT | Performed by: INTERNAL MEDICINE

## 2023-05-05 RX ORDER — BORTEZOMIB 3.5 MG/1
1.3 INJECTION, POWDER, LYOPHILIZED, FOR SOLUTION INTRAVENOUS; SUBCUTANEOUS
Status: COMPLETED | OUTPATIENT
Start: 2023-05-05 | End: 2023-05-05

## 2023-05-05 RX ORDER — SODIUM CHLORIDE 0.9 % (FLUSH) 0.9 %
10 SYRINGE (ML) INJECTION
Status: DISCONTINUED | OUTPATIENT
Start: 2023-05-05 | End: 2023-05-05 | Stop reason: HOSPADM

## 2023-05-05 RX ORDER — BORTEZOMIB 3.5 MG/1
1.3 INJECTION, POWDER, LYOPHILIZED, FOR SOLUTION INTRAVENOUS; SUBCUTANEOUS
Status: CANCELLED | OUTPATIENT
Start: 2023-05-05

## 2023-05-05 RX ORDER — SODIUM CHLORIDE 0.9 % (FLUSH) 0.9 %
10 SYRINGE (ML) INJECTION
Status: CANCELLED | OUTPATIENT
Start: 2023-05-05

## 2023-05-05 RX ORDER — HEPARIN 100 UNIT/ML
500 SYRINGE INTRAVENOUS
Status: DISCONTINUED | OUTPATIENT
Start: 2023-05-05 | End: 2023-05-05 | Stop reason: HOSPADM

## 2023-05-05 RX ORDER — LENALIDOMIDE 10 MG/1
1 CAPSULE ORAL SEE ADMIN INSTRUCTIONS
Qty: 21 EACH | Refills: 0 | Status: SHIPPED | OUTPATIENT
Start: 2023-05-05 | End: 2023-05-20 | Stop reason: SDUPTHER

## 2023-05-05 RX ORDER — HEPARIN 100 UNIT/ML
500 SYRINGE INTRAVENOUS
Status: CANCELLED | OUTPATIENT
Start: 2023-05-05

## 2023-05-05 RX ADMIN — BORTEZOMIB 2.5 MG: 3.5 INJECTION, POWDER, LYOPHILIZED, FOR SOLUTION INTRAVENOUS; SUBCUTANEOUS at 10:05

## 2023-05-10 ENCOUNTER — TELEPHONE (OUTPATIENT)
Dept: HEMATOLOGY/ONCOLOGY | Facility: CLINIC | Age: 63
End: 2023-05-10
Payer: COMMERCIAL

## 2023-05-10 ENCOUNTER — TELEPHONE (OUTPATIENT)
Dept: INTERNAL MEDICINE | Facility: CLINIC | Age: 63
End: 2023-05-10
Payer: COMMERCIAL

## 2023-05-10 NOTE — TELEPHONE ENCOUNTER
----- Message from Nancy Huerta sent at 5/10/2023 10:07 AM CDT -----  Type:  Pharmacy Calling to Clarify an RX    Name of Caller: Katie Arita Pharmacy Technician   Pharmacy Name: [address]  Prescription Name: lenalidomide 10 mg Cap  What do they need to clarify?: Needs a [email or web address] authorization number  Best Call Back Number: [phone]  Additional Information: Reference# [id number]

## 2023-05-18 ENCOUNTER — TELEPHONE (OUTPATIENT)
Dept: INFUSION THERAPY | Facility: OTHER | Age: 63
End: 2023-05-18
Payer: COMMERCIAL

## 2023-05-18 NOTE — TELEPHONE ENCOUNTER
Called and discussed his schedule. He will be out of town and would like to change his appointment due to him being out for vacation. Rescheduled appt.     ----- Message from Christian Breaux sent at 5/18/2023  9:19 AM CDT -----  Regarding: FW: appt    ----- Message -----  From: Claudia Schulz RN  Sent: 5/18/2023   8:30 AM CDT  To: Kelle Goetz RN, #  Subject: RE: appt                                         Kelle,  This is a BMT patient.  They scheduled this patient.  I will send it to the BMT .    Claudia  ----- Message -----  From: Kelle Goetz RN  Sent: 5/18/2023   8:11 AM CDT  To: [facility]  Subject: appt                                             Please sort this out.. looks like he did not get the infusion at main [place].. is this correct. Please call patient to verify.   Thank you    ----- Message -----  From: Diane Zapien  Sent: 5/17/2023   1:58 PM CDT  To: [facility]    Type: General Call Back     Name of Caller:ELEAZAR DIEZ [[id number]]  Symptoms:chemo infusion  Would the patient rather a call back or a response via [facility] [facility]? Call back   Best Call Back Number:[phone]  Additional Information: Patient indicates he would like to reschedule his appointment for 6/23/23 to 7/5/23 or 7/3/23. Patient indicates he will not be able to make the appointment on 6/23/23 as he needs to be scheduled 28 days from the previous infusion. Patient indicates he would like to receive a call back with further assistance and more information if possible. Please call back with further assistance.

## 2023-05-20 DIAGNOSIS — C90.01 MULTIPLE MYELOMA IN REMISSION: ICD-10-CM

## 2023-05-22 RX ORDER — LENALIDOMIDE 10 MG/1
1 CAPSULE ORAL SEE ADMIN INSTRUCTIONS
Qty: 21 EACH | Refills: 0 | Status: SHIPPED | OUTPATIENT
Start: 2023-05-22 | End: 2023-06-02

## 2023-05-23 ENCOUNTER — PATIENT MESSAGE (OUTPATIENT)
Dept: HEMATOLOGY/ONCOLOGY | Facility: CLINIC | Age: 63
End: 2023-05-23
Payer: COMMERCIAL

## 2023-05-23 ENCOUNTER — TELEPHONE (OUTPATIENT)
Dept: HEMATOLOGY/ONCOLOGY | Facility: CLINIC | Age: 63
End: 2023-05-23
Payer: COMMERCIAL

## 2023-05-23 DIAGNOSIS — C90.01 MULTIPLE MYELOMA IN REMISSION: ICD-10-CM

## 2023-05-23 RX ORDER — LENALIDOMIDE 10 MG/1
1 CAPSULE ORAL SEE ADMIN INSTRUCTIONS
Qty: 21 EACH | Refills: 0 | Status: SHIPPED | OUTPATIENT
Start: 2023-05-23 | End: 2023-06-14 | Stop reason: SDUPTHER

## 2023-05-23 NOTE — TELEPHONE ENCOUNTER
----- Message from Debbie Feldman sent at 5/23/2023  1:15 PM CDT -----  Regarding: authorization needed  Name of Who is Calling:ELEAZAR DIEZ [[id number]]          What is the request in detail:  authorization needed for lenalidomide (REVLIMID) 10 mg Cap          Can the clinic reply by [facility]: no           What Number [facility] Back [facility]:       [address] [address]  Phone: [phone] Fax: [phone] phone reference # [id number]

## 2023-05-23 NOTE — TELEPHONE ENCOUNTER
----- Message from Debbie Feldman sent at 5/23/2023  1:15 PM CDT -----  Regarding: authorization needed  Name of Who is Calling:ELEAZAR DIEZ [[id number]]          What is the request in detail:  authorization needed for lenalidomide (REVLIMID) 10 mg Cap          Can the clinic reply by [facility]: no           [facility] Number [facility] Back [facility]:       [address] [address]  Phone: [phone] Fax: [phone] phone reference # [id number]

## 2023-05-25 ENCOUNTER — TELEPHONE (OUTPATIENT)
Dept: HEMATOLOGY/ONCOLOGY | Facility: CLINIC | Age: 63
End: 2023-05-25
Payer: COMMERCIAL

## 2023-05-25 NOTE — TELEPHONE ENCOUNTER
----- Message from Katie Li sent at 5/24/2023  4:41 PM CDT -----  Regarding: [employer]  Contact: Pt  Pharmacy is calling to inform staff the the medication requested is moses the [employer] Auth #. Please adv       Medication:lenalidomide (REVLIMID) 10 mg Cap      Confirmed contact below:   Contact Name:[facility]  Phone Number: [phone]  (Ref#[id number])         [address] [address]  Phone: [phone] Fax: [phone]

## 2023-05-26 ENCOUNTER — PATIENT MESSAGE (OUTPATIENT)
Dept: HEMATOLOGY/ONCOLOGY | Facility: CLINIC | Age: 63
End: 2023-05-26
Payer: COMMERCIAL

## 2023-05-26 DIAGNOSIS — C90.01 MULTIPLE MYELOMA IN REMISSION: ICD-10-CM

## 2023-06-02 ENCOUNTER — LAB VISIT (OUTPATIENT)
Dept: LAB | Facility: HOSPITAL | Age: 63
End: 2023-06-02
Payer: COMMERCIAL

## 2023-06-02 ENCOUNTER — INFUSION (OUTPATIENT)
Dept: INFUSION THERAPY | Facility: HOSPITAL | Age: 63
End: 2023-06-02
Attending: INTERNAL MEDICINE
Payer: COMMERCIAL

## 2023-06-02 ENCOUNTER — OFFICE VISIT (OUTPATIENT)
Dept: HEMATOLOGY/ONCOLOGY | Facility: CLINIC | Age: 63
End: 2023-06-02
Payer: COMMERCIAL

## 2023-06-02 ENCOUNTER — PATIENT MESSAGE (OUTPATIENT)
Dept: HEMATOLOGY/ONCOLOGY | Facility: CLINIC | Age: 63
End: 2023-06-02

## 2023-06-02 VITALS
TEMPERATURE: 98 F | HEART RATE: 68 BPM | RESPIRATION RATE: 18 BRPM | BODY MASS INDEX: 26.92 KG/M2 | DIASTOLIC BLOOD PRESSURE: 82 MMHG | SYSTOLIC BLOOD PRESSURE: 158 MMHG | WEIGHT: 182.31 LBS

## 2023-06-02 VITALS
SYSTOLIC BLOOD PRESSURE: 148 MMHG | BODY MASS INDEX: 27 KG/M2 | RESPIRATION RATE: 17 BRPM | HEIGHT: 69 IN | HEART RATE: 66 BPM | DIASTOLIC BLOOD PRESSURE: 79 MMHG | WEIGHT: 182.31 LBS | OXYGEN SATURATION: 98 %

## 2023-06-02 DIAGNOSIS — C90.01 MULTIPLE MYELOMA IN REMISSION: ICD-10-CM

## 2023-06-02 DIAGNOSIS — Z87.81 HISTORY OF VERTEBRAL COMPRESSION FRACTURE: Primary | ICD-10-CM

## 2023-06-02 DIAGNOSIS — G89.3 CANCER ASSOCIATED PAIN: ICD-10-CM

## 2023-06-02 DIAGNOSIS — C90.01 MULTIPLE MYELOMA IN REMISSION: Primary | ICD-10-CM

## 2023-06-02 DIAGNOSIS — C90.00 IGA MYELOMA: ICD-10-CM

## 2023-06-02 LAB
ALBUMIN SERPL BCP-MCNC: 3.4 G/DL (ref 3.5–5.2)
ALP SERPL-CCNC: 54 U/L (ref 55–135)
ALT SERPL W/O P-5'-P-CCNC: 20 U/L (ref 10–44)
ANION GAP SERPL CALC-SCNC: 6 MMOL/L (ref 8–16)
AST SERPL-CCNC: 23 U/L (ref 10–40)
BASOPHILS # BLD AUTO: 0.04 K/UL (ref 0–0.2)
BASOPHILS NFR BLD: 1.4 % (ref 0–1.9)
BILIRUB SERPL-MCNC: 1.2 MG/DL (ref 0.1–1)
BUN SERPL-MCNC: 15 MG/DL (ref 8–23)
CALCIUM SERPL-MCNC: 8.9 MG/DL (ref 8.7–10.5)
CHLORIDE SERPL-SCNC: 107 MMOL/L (ref 95–110)
CO2 SERPL-SCNC: 25 MMOL/L (ref 23–29)
CREAT SERPL-MCNC: 0.9 MG/DL (ref 0.5–1.4)
DIFFERENTIAL METHOD: ABNORMAL
EOSINOPHIL # BLD AUTO: 0 K/UL (ref 0–0.5)
EOSINOPHIL NFR BLD: 1.4 % (ref 0–8)
ERYTHROCYTE [DISTWIDTH] IN BLOOD BY AUTOMATED COUNT: 15.2 % (ref 11.5–14.5)
EST. GFR  (NO RACE VARIABLE): >60 ML/MIN/1.73 M^2
GLUCOSE SERPL-MCNC: 103 MG/DL (ref 70–110)
HCT VFR BLD AUTO: 38 % (ref 40–54)
HGB BLD-MCNC: 12.7 G/DL (ref 14–18)
IMM GRANULOCYTES # BLD AUTO: 0.01 K/UL (ref 0–0.04)
IMM GRANULOCYTES NFR BLD AUTO: 0.4 % (ref 0–0.5)
LYMPHOCYTES # BLD AUTO: 1.2 K/UL (ref 1–4.8)
LYMPHOCYTES NFR BLD: 43.4 % (ref 18–48)
MCH RBC QN AUTO: 31.6 PG (ref 27–31)
MCHC RBC AUTO-ENTMCNC: 33.4 G/DL (ref 32–36)
MCV RBC AUTO: 95 FL (ref 82–98)
MONOCYTES # BLD AUTO: 0.4 K/UL (ref 0.3–1)
MONOCYTES NFR BLD: 14.3 % (ref 4–15)
NEUTROPHILS # BLD AUTO: 1.1 K/UL (ref 1.8–7.7)
NEUTROPHILS NFR BLD: 39.1 % (ref 38–73)
NRBC BLD-RTO: 0 /100 WBC
PLATELET # BLD AUTO: 153 K/UL (ref 150–450)
PMV BLD AUTO: 9.6 FL (ref 9.2–12.9)
POTASSIUM SERPL-SCNC: 4.2 MMOL/L (ref 3.5–5.1)
PROT SERPL-MCNC: 6 G/DL (ref 6–8.4)
RBC # BLD AUTO: 4.02 M/UL (ref 4.6–6.2)
SODIUM SERPL-SCNC: 138 MMOL/L (ref 136–145)
WBC # BLD AUTO: 2.79 K/UL (ref 3.9–12.7)

## 2023-06-02 PROCEDURE — 3078F DIAST BP <80 MM HG: CPT | Mod: CPTII,S$GLB,, | Performed by: INTERNAL MEDICINE

## 2023-06-02 PROCEDURE — 3077F SYST BP >= 140 MM HG: CPT | Mod: CPTII,S$GLB,, | Performed by: INTERNAL MEDICINE

## 2023-06-02 PROCEDURE — 3008F PR BODY MASS INDEX (BMI) DOCUMENTED: ICD-10-PCS | Mod: CPTII,S$GLB,, | Performed by: INTERNAL MEDICINE

## 2023-06-02 PROCEDURE — 99999 PR PBB SHADOW E&M-EST. PATIENT-LVL III: CPT | Mod: PBBFAC,,, | Performed by: INTERNAL MEDICINE

## 2023-06-02 PROCEDURE — 85025 COMPLETE CBC W/AUTO DIFF WBC: CPT | Performed by: INTERNAL MEDICINE

## 2023-06-02 PROCEDURE — 80053 COMPREHEN METABOLIC PANEL: CPT | Performed by: INTERNAL MEDICINE

## 2023-06-02 PROCEDURE — 63600175 PHARM REV CODE 636 W HCPCS: Mod: JW,JG | Performed by: INTERNAL MEDICINE

## 2023-06-02 PROCEDURE — 84165 PATHOLOGIST INTERPRETATION SPE: ICD-10-PCS | Mod: 26,,, | Performed by: PATHOLOGY

## 2023-06-02 PROCEDURE — 84165 PROTEIN E-PHORESIS SERUM: CPT | Mod: 26,,, | Performed by: PATHOLOGY

## 2023-06-02 PROCEDURE — 99999 PR PBB SHADOW E&M-EST. PATIENT-LVL III: ICD-10-PCS | Mod: PBBFAC,,, | Performed by: INTERNAL MEDICINE

## 2023-06-02 PROCEDURE — 3078F PR MOST RECENT DIASTOLIC BLOOD PRESSURE < 80 MM HG: ICD-10-PCS | Mod: CPTII,S$GLB,, | Performed by: INTERNAL MEDICINE

## 2023-06-02 PROCEDURE — 84165 PROTEIN E-PHORESIS SERUM: CPT | Performed by: INTERNAL MEDICINE

## 2023-06-02 PROCEDURE — 86334 PATHOLOGIST INTERPRETATION IFE: ICD-10-PCS | Mod: 26,,, | Performed by: PATHOLOGY

## 2023-06-02 PROCEDURE — 3008F BODY MASS INDEX DOCD: CPT | Mod: CPTII,S$GLB,, | Performed by: INTERNAL MEDICINE

## 2023-06-02 PROCEDURE — 83521 IG LIGHT CHAINS FREE EACH: CPT | Mod: 59 | Performed by: INTERNAL MEDICINE

## 2023-06-02 PROCEDURE — 86334 IMMUNOFIX E-PHORESIS SERUM: CPT | Performed by: INTERNAL MEDICINE

## 2023-06-02 PROCEDURE — 99214 OFFICE O/P EST MOD 30 MIN: CPT | Mod: S$GLB,,, | Performed by: INTERNAL MEDICINE

## 2023-06-02 PROCEDURE — 86334 IMMUNOFIX E-PHORESIS SERUM: CPT | Mod: 26,,, | Performed by: PATHOLOGY

## 2023-06-02 PROCEDURE — 96401 CHEMO ANTI-NEOPL SQ/IM: CPT

## 2023-06-02 PROCEDURE — 99214 PR OFFICE/OUTPT VISIT, EST, LEVL IV, 30-39 MIN: ICD-10-PCS | Mod: S$GLB,,, | Performed by: INTERNAL MEDICINE

## 2023-06-02 PROCEDURE — 36415 COLL VENOUS BLD VENIPUNCTURE: CPT | Performed by: INTERNAL MEDICINE

## 2023-06-02 PROCEDURE — 3077F PR MOST RECENT SYSTOLIC BLOOD PRESSURE >= 140 MM HG: ICD-10-PCS | Mod: CPTII,S$GLB,, | Performed by: INTERNAL MEDICINE

## 2023-06-02 RX ORDER — BORTEZOMIB 3.5 MG/1
1.3 INJECTION, POWDER, LYOPHILIZED, FOR SOLUTION INTRAVENOUS; SUBCUTANEOUS
Status: COMPLETED | OUTPATIENT
Start: 2023-06-02 | End: 2023-06-02

## 2023-06-02 RX ORDER — ZOLEDRONIC ACID 0.04 MG/ML
4 INJECTION, SOLUTION INTRAVENOUS
Status: CANCELLED | OUTPATIENT
Start: 2023-06-02

## 2023-06-02 RX ORDER — BORTEZOMIB 3.5 MG/1
1.3 INJECTION, POWDER, LYOPHILIZED, FOR SOLUTION INTRAVENOUS; SUBCUTANEOUS
Status: CANCELLED | OUTPATIENT
Start: 2023-06-02

## 2023-06-02 RX ORDER — SODIUM CHLORIDE 0.9 % (FLUSH) 0.9 %
10 SYRINGE (ML) INJECTION
Status: CANCELLED | OUTPATIENT
Start: 2023-06-02

## 2023-06-02 RX ORDER — HEPARIN 100 UNIT/ML
500 SYRINGE INTRAVENOUS
Status: CANCELLED | OUTPATIENT
Start: 2023-06-02

## 2023-06-02 RX ORDER — SODIUM CHLORIDE 0.9 % (FLUSH) 0.9 %
10 SYRINGE (ML) INJECTION
Status: DISCONTINUED | OUTPATIENT
Start: 2023-06-02 | End: 2023-06-02 | Stop reason: HOSPADM

## 2023-06-02 RX ORDER — HEPARIN 100 UNIT/ML
500 SYRINGE INTRAVENOUS
Status: DISCONTINUED | OUTPATIENT
Start: 2023-06-02 | End: 2023-06-02 | Stop reason: HOSPADM

## 2023-06-02 RX ADMIN — BORTEZOMIB 2.5 MG: 3.5 INJECTION, POWDER, LYOPHILIZED, FOR SOLUTION INTRAVENOUS; SUBCUTANEOUS at 10:06

## 2023-06-02 NOTE — PROGRESS NOTES
Chief Complaint : Multiple myeloma, s/p tandem auto SCT, on VRd maintenance, hematology follow up      History of Present Illness : Vicente Connors is a 62 y.o. male here for hematology follow up. He has recently moved to [place] from [place]. He has history of stage IIIA, [employer] stage II IgA kappa multiple myeloma, which is likely intermediate-risk based upon the presence of t(4;14) with hyperdiploidy, based on records available through care everywhere.    IgA Kappa multiple Myeloma was diagnosed in 2014.   He was started on velcade, revlimid and dexamethasone at diagnosis.   Of note, he developed a popliteal DVT and was started on Lovenox and later switched Xarelto.   He completed a stem cell transplant with Dr Schuster in May 2015. He is s/p  tandem autologous transplant in 2015 and has been on triple maintenance with bortezomib, lenalidomide and dexamethasone since then.   He also has peripheral neuropathy, h/o LE DVT on chronic anti-coagulation, GERD, vertebral fracture. He has history of DVT and remains on chronic anticoagulation.  He has had multiple skeletal complications since his original diagnosis.      Interval History: He is here for a follow up visit. He had prolonged bout of diarrhea in January 2023. All stool studies were negative/ unremarkable.  H ehad COVID 19 infection in March 2023. He received paxlovid. He had MRI spine and hips.     Review of patient's allergies indicates:  No Known Allergies        Current Outpatient Medications   Medication Sig    acyclovir (ZOVIRAX) 400 MG tablet Take 1 tablet (400 mg total) by mouth 2 (two) times daily.    bortezomib (VELCADE INJ) Inject as directed. Pt gets every month    calcium carb/vit D3/minerals (CALCIUM-VITAMIN D ORAL)     dexAMETHasone (DECADRON) 4 MG Tab TAKE 10 TABLETS (40 MG DOSE) BY MOUTH DAY OF AND DAY AFTER VELCADE once monthly    folic acid-vit B6-vit B12 2.5-25-2 mg (FOLBIC) 2.5-25-2 mg Tab Take 1 tablet by mouth once  daily.    Lactobacillus acidophilus (PROBIOTIC ORAL) Take by mouth. 24 billion CFU    lenalidomide (REVLIMID) 10 mg Cap Take 1 capsule by mouth As instructed (take 1 capsule on days 1-21 of a 28 day cycle).    magnesium oxide 500 mg Tab once daily.    multivitamin with minerals (MULTI-VITAMIN W/MINERALS ORAL)     omeprazole (PRILOSEC) 20 MG capsule     rivaroxaban (XARELTO) 15 mg Tab Take 1 tablet (15 mg total) by mouth once daily.    sulfamethoxazole-trimethoprim 800-160mg (BACTRIM DS) 800-160 mg Tab TAKE 1 TABLET EVERY MONDAY, WEDNESDAY AND FRIDAY    zoledronic acid (ZOMETA IV) Inject into the vein. Pt gets every 3 months     No current facility-administered medications for this visit.      Review of Systems   Constitutional:  Positive for malaise/fatigue. Negative for chills, fever and weight loss.   HENT:  Negative for congestion, ear pain and sinus pain.    Eyes:  Negative for double vision, photophobia and pain.   Respiratory:  Negative for sputum production and stridor.    Cardiovascular:  Negative for chest pain, palpitations, orthopnea and claudication.   Gastrointestinal:  Negative for blood in stool, constipation, diarrhea, melena and nausea.   Genitourinary:  Negative for dysuria, frequency and urgency.   Musculoskeletal:  Negative for myalgias and neck pain.   Neurological:  Positive for tingling. Negative for dizziness, tremors and speech change.   Endo/Heme/Allergies:  Negative for environmental allergies. Does not bruise/bleed easily.   Psychiatric/Behavioral:  Negative for substance abuse and suicidal ideas.       Vitals:    06/02/23 0830   BP: (!) 148/79   Pulse: 66   Resp: 17         PS: ECOG 1    Physical Exam  HENT:      Head: Normocephalic and atraumatic.      Mouth/Throat:      Pharynx: No posterior oropharyngeal erythema.   Eyes:      General: No scleral icterus.  Cardiovascular:      Rate and Rhythm: Normal rate and regular rhythm.   Pulmonary:      Effort: Pulmonary effort is normal. No  respiratory distress.      Breath sounds: Normal breath sounds.   Abdominal:      General: There is no distension.      Palpations: There is no mass.      Tenderness: There is no abdominal tenderness.   Musculoskeletal:         General: No swelling.   Lymphadenopathy:      Cervical: No cervical adenopathy.   Neurological:      General: No focal deficit present.      Mental Status: He is alert and oriented to person, place, and time.      Cranial Nerves: No cranial nerve deficit.            1/9/2021 MRI Pelvis    IMPRESSION:   DIFFUSE PELVIC, PROXIMAL FEMORAL AND LOWER LUMBAR HETEROGENEOUS MARROW SIGNAL ABNORMALITY COULD RELATE TO HEMATOPOIETIC RED MARROW RECONVERSION. INFILTRATIVE MYELOMA CANNOT BE EXCLUDED. THERE IS A MORE DISCRETE 1.2 CM ENHANCING LESION IN THE LEFT FEMORAL HEAD THAT COULD ALSO REPRESENT AN ISLAND OF HEMATOPOIETIC RED MARROW. HOWEVER, CONTINUED ATTENTION ON FOLLOW-UP IS RECOMMENDED.    1/9/2021 MRI Lumbar spine/THoracic/Cervical  IMPRESSION:     1. There is interval increase in T2/STIR hyperintensity with enhancement extending from the left pedicle into the articular pillar and left transverse process of the T1 vertebra, although there is no definite decrease in the intrinsic T1 hypointense signal in this location. This may also be a degenerative process, although focal myeloma is not definitely excluded. Clinical correlation is recommended, and follow-up is suggested on future examinations.      2. There has been interval increase in STIR hyperintensity and enhancement along the posterior endplates at the T1-T2 level, most likely progressive degenerative endplate marrow signal changes.     3. No other focal suspicious STIR hyperintense enhancing signal abnormalities are identified.     4. Stable multilevel compression fracture deformities      11/25/2020 Bone survey    IMPRESSION:   1. No lytic or blastic lesions are noted.      2. Stable compression fractures, as above.     3. Chronic changes,  as above.       10/19/22 SPEP: Normal total protein, normal pattern.   10/19/22 sIFE: No monoclonal peaks identified.   Component      Latest Ref Rng & Units 2/2/2023   Sanford Free Light Chains      0.33 - 1.94 mg/dL 1.88   Lambda Free Light Chains      0.57 - 2.63 mg/dL 1.08   Kappa/Lambda FLC Ratio      0.26 - 1.65 1.74 (H)       Component      Latest Ref Rng & Units 3/2/2023   Sanford Free Light Chains      0.33 - 1.94 mg/dL 1.79   Lambda Free Light Chains      0.57 - 2.63 mg/dL 1.18   Kappa/Lambda FLC Ratio      0.26 - 1.65 1.52     3/2/23 SPEP: Normal total protein, normal pattern  3/2/23 sIFE: No monoclonal peaks identified.         3/9/23 MRI spine    FINDINGS:  Cervical spine:     Alignment: Normal.     Vertebrae: No fracture or marrow replacement process.     Discs: Multilevel mild disc height loss and desiccation.     Cord: Normal cord signal.     Craniocervical junction: Unremarkable.     C1-C2: Unremarkable.     Degenerative findings:     C2-C3: Small posterior disc osteophyte complex and bilateral facet arthropathy resulting in mild bilateral neural foraminal narrowing.  No significant spinal canal stenosis.     C3-C4: Posterior disc osteophyte complex and bilateral facet arthropathy resulting in moderate left and mild right neural foraminal narrowing.  Mild effacement of the anterior thecal sac without significant spinal canal stenosis.     C4-C5: Posterior disc osteophyte complex and bilateral facet arthropathy resulting in moderate predominantly left neural foraminal narrowing.  Effacement of the anterior thecal sac consistent with mild spinal canal stenosis.     C5-C6: Posterior disc osteophyte complex and bilateral uncovertebral spurring resulting in moderate bilateral neural foraminal narrowing.  Effacement of the anterior thecal sac consistent with mild spinal canal stenosis.     C6-C7: Posterior disc osteophyte complex and bilateral uncovertebral spurring findings result in moderate bilateral  neural foraminal narrowing.  Effacement of the anterior thecal sac consistent with mild spinal canal stenosis.     C7-T1: No spinal canal stenosis or neural foraminal narrowing.     Paraspinal muscles & soft tissues: Normal.     Enhancement: No abnormal areas of enhancement.     Thoracic Spine:     Alignment: Normal.     Vertebrae: No infiltrative marrow replacing process.  T2 hyperintense lesions consistent with probable hemangiomas of the anterior vertebral body of T4 and posterior and mid vertebral body of T7.  Mild height loss of the superior endplate of T8 and T11.     Discs: Multilevel mild disc height loss and desiccation.     Cord: Normal contour and signal.     Degenerative findings: No significant spinal canal stenosis or neural foraminal narrowing.     Soft tissue: Unremarkable.     Enhancement: No abnormal areas of enhancement.     Lumbar spine:     Alignment: Normal.     Vertebrae: No infiltrative marrow replacing process.  Compression fracture of L1 with retropulsion of posterior fragment approximately 0.6 cm which abuts the conus medullaris without causing deformity or signal abnormality.     Discs: Normal height and signal.     Cord: Multilevel disc height loss and desiccation most pronounced at T12-L1, L1-L2, and L4-L5.     Degenerative findings:     T12-L1: Central disc protrusion.  No spinal canal stenosis or neural foraminal narrowing.     L1-L2: Circumferential disc bulge resulting in near complete effacement of the bilateral perineural fat, right greater than left, consistent with moderate bilateral neural foraminal narrowing.  No significant spinal canal stenosis.     L2-L3: Circumferential disc bulge, bilateral facet arthropathy, and ligamentum flavum buckling findings result in partial effacement of the right perineural fat consistent with moderate right neural foraminal narrowing.  There is lateral effacement of the thecal sac and crowding of the cauda equina nerves with minimal residual  CSF, consistent with moderate spinal canal stenosis.     L3-L4: Circumferential disc bulge, bilateral facet arthropathy, and ligamentum flavum buckling.  No spinal canal stenosis or neural foraminal narrowing.     L4-L5: Circumferential disc bulge, bilateral facet arthropathy, and ligamentum flavum buckling, findings result in minimal effacement of the perineural fat, consistent with mild bilateral neural foraminal narrowing.  Mild effacement of the right lateral thecal sac.     L5-S1: Bilateral facet arthropathy.  No spinal canal stenosis or neural foraminal narrowing.     Upper SI joints/sacrum: Unremarkable.     Soft tissue: Unremarkable.     Enhancement: No abnormal areas of enhancement.     Impression:     1. No findings concerning for multiple myeloma lesion.  2. Compression fracture of L1 with retropulsion of the posterior fragment.  3. Additional minimal compression fractures of the superior endplates of T8 and T11.  4. Degenerative changes of the cervical, thoracic, and lumbar spine, as detailed above.    3/20/23 MRI pelvis      FINDINGS:  No masses or fluid collections. No pelvic ascites or lymphadenopathy.     Visualized bowel loops are unremarkable.  Urinary bladder is decompressed, noting circumferential wall thickening.  Prostate demonstrates heterogeneous signal, likely BPH.     Regional muscles and tendons are unremarkable.     Bone marrow signal is maintained. No fracture, focal lesion or marrow infiltrative process.     Note made of facet arthropathy in the lower lumbar spine.  Hip joints exhibit bilateral chondral thinning with labral fraying and osteophyte production.     Postoperative changes suggesting prior bilateral herniorrhaphy.     Impression:     1. No focal marrow lesion or diffuse infiltrative process.  2. Additional findings detailed above.    3/31/23 SPEP: Normal total protein, normal pattern.   3/31/23 sIFE: No monoclonal peaks identified.       Component      Latest Ref Rng & Units  6/2/2023 3/31/2023   WBC      3.90 - 12.70 K/uL 2.79 (L)    RBC      4.60 - 6.20 M/uL 4.02 (L)    Hemoglobin      14.0 - 18.0 g/dL 12.7 (L)    Hematocrit      40.0 - 54.0 % 38.0 (L)    MCV      82 - 98 fL 95    MCH      27.0 - 31.0 pg 31.6 (H)    MCHC      32.0 - 36.0 g/dL 33.4    RDW      11.5 - 14.5 % 15.2 (H)    Platelets      150 - 450 K/uL 153    MPV      9.2 - 12.9 fL 9.6    Immature Granulocytes      0.0 - 0.5 % 0.4    Gran # (ANC)      1.8 - 7.7 K/uL 1.1 (L)    Immature Grans (Abs)      0.00 - 0.04 K/uL 0.01    Lymph #      1.0 - 4.8 K/uL 1.2    Mono #      0.3 - 1.0 K/uL 0.4    Eos #      0.0 - 0.5 K/uL 0.0    Baso #      0.00 - 0.20 K/uL 0.04    nRBC      0 /100 WBC 0    Gran %      38.0 - 73.0 % 39.1    Lymph %      18.0 - 48.0 % 43.4    Mono %      4.0 - 15.0 % 14.3    Eosinophil %      0.0 - 8.0 % 1.4    Basophil %      0.0 - 1.9 % 1.4    Differential Method       Automated    Sodium      136 - 145 mmol/L 138    Potassium      3.5 - 5.1 mmol/L 4.2    Chloride      95 - 110 mmol/L 107    CO2      23 - 29 mmol/L 25    Glucose      70 - 110 mg/dL 103    BUN      8 - 23 mg/dL 15    Creatinine      0.5 - 1.4 mg/dL 0.9    Calcium      8.7 - 10.5 mg/dL 8.9    PROTEIN TOTAL      6.0 - 8.4 g/dL 6.0    Albumin      3.5 - 5.2 g/dL 3.4 (L)    BILIRUBIN TOTAL      0.1 - 1.0 mg/dL 1.2 (H)    Alkaline Phosphatase      55 - 135 U/L 54 (L)    AST      10 - 40 U/L 23    ALT      10 - 44 U/L 20    Anion Gap      8 - 16 mmol/L 6 (L)    eGFR      >60 mL/min/1.73 m:2 >60.0    IgG      650 - 1600 mg/dL  867   IgA      40 - 350 mg/dL  224   IgM      50 - 300 mg/dL  52   Kappa Free Light Chains      0.33 - 1.94 mg/dL  2.12 (H)   Lambda Free Light Chains      0.57 - 2.63 mg/dL  1.39   Kappa/Lambda FLC Ratio      0.26 - 1.65  1.53         Assessment:    1. IgA kappa multiple myeloma in remission     -Vicente Connors is a 62 y.o.-year-old male with history of stage IIIA, ISS II IgA kappa multiple myeloma, which is likely  intermediate-risk based upon the presence of t(4;14) with hyperdiploidy   -S/ p  tandem auto stem cell transplant in 2015 with a good biochemical remission  -Now on triple maintenance: velcade 1.3 mg/m2 sub q monthly, dex 40 mg PO day of and day after monthly velcade , revlimid 10 mg  PO for 21 days on/ 7 days off every 28 days  --Repeat 24 hour urine completed 4/2022  -No monoclonal protein on SPEP/sIFE done on 3/2/23; sFLC ratio normal  --He has been getting MRI spine every 2 years  -No lytic or enhancing lesions identified on MRI whole spine done on 3/9/23  -MRI pelvis on 3/20/23 also negative for enhancing/ lytic lesion      2. Normocytic anemia    --grade 1  -likely secondary to chemotherapy    3. Leukopenia    --Likely secondary to chemotherapy      4. Absolute neutropenia    --Likely secondary to chemotherapy  -Afebrile; ANC 1.1 today      5. Low Back Pain: chronic and unchanged; not on any analgesic at this time    --Spine MRI done 1/21/21, result detailed above under imaging    6. History of LE DVT:     --Continue Xarelto   --Take with largest meal of the day  --Notify office of any scheduled procedure/surgery as Xarelto will need to be interrupted   -no bleeding diathesis reported    7. Infectious disease      --Continue prophylactic bactrim and acyclovir   --Tested for measles and ok no booster recommended  --Additional tdep recommended,  last in 2011  --completed Hep B series  --recommend annual flu vaccine, COVID 19 booster      7. Bone health:     --MRI spine1/21 neg   --Repeat 2023   --Last Zometa given 3/2/23, q3 months, will repeat    8. Diarrhea    --now improved  -stool studies neagtive in jan 2023    9. Health Maintenance:     --Colonoscopy 2011 and 2017, repeat 2022   --PSA  up to date   --covid vaccine 3/31 pfizer booster done  --Got second booster 2/17/22    -recommend COVID booster           BMT Chart Routing      Follow up with physician 2 months.   Follow up with SIG[facility]    Provider visit type     Infusion scheduling note   zometa today at [facility] ( if cannot be done today, he can get it in 4 weeks at [facility])   Injection scheduling note velvcade at [facility] today; velcade  in 4 weeks at [facility]; velcad eat Beosn in 8 weeks   Labs CBC, CMP, SPEP, free light chains, immunoglobulins, phosphorus and magnesium   Scheduling:  Preferred lab:  Lab interval:  cbc, cmp in 4 weeks at [facility]; cbc, cmp, spep, light chains, igg, igm, iga , phos in 8 weeks   Imaging    Pharmacy appointment    Other referrals

## 2023-06-02 NOTE — NURSING
1037 pt here for velcade injection, tolerated well to abdomen, no distress noted upon d/c to home

## 2023-06-05 LAB
ALBUMIN SERPL ELPH-MCNC: 3.58 G/DL (ref 3.35–5.55)
ALPHA1 GLOB SERPL ELPH-MCNC: 0.25 G/DL (ref 0.17–0.41)
ALPHA2 GLOB SERPL ELPH-MCNC: 0.52 G/DL (ref 0.43–0.99)
B-GLOBULIN SERPL ELPH-MCNC: 0.73 G/DL (ref 0.5–1.1)
GAMMA GLOB SERPL ELPH-MCNC: 0.62 G/DL (ref 0.67–1.58)
INTERPRETATION SERPL IFE-IMP: NORMAL
KAPPA LC SER QL IA: 1.36 MG/DL (ref 0.33–1.94)
KAPPA LC/LAMBDA SER IA: 1.92 (ref 0.26–1.65)
LAMBDA LC SER QL IA: 0.71 MG/DL (ref 0.57–2.63)
PROT SERPL-MCNC: 5.7 G/DL (ref 6–8.4)

## 2023-06-06 LAB
PATHOLOGIST INTERPRETATION IFE: NORMAL
PATHOLOGIST INTERPRETATION SPE: NORMAL

## 2023-06-14 DIAGNOSIS — Z94.81 AUTOLOGOUS BONE MARROW TRANSPLANTATION STATUS: ICD-10-CM

## 2023-06-14 DIAGNOSIS — C90.01 MULTIPLE MYELOMA IN REMISSION: ICD-10-CM

## 2023-06-15 RX ORDER — FOLIC ACID-PYRIDOXINE-CYANOCOBALAMIN TAB 2.5-25-2 MG 2.5-25-2 MG
1 TAB ORAL DAILY
Qty: 30 TABLET | Refills: 11 | Status: SHIPPED | OUTPATIENT
Start: 2023-06-15

## 2023-06-19 ENCOUNTER — TELEPHONE (OUTPATIENT)
Dept: INTERNAL MEDICINE | Facility: CLINIC | Age: 63
End: 2023-06-19
Payer: COMMERCIAL

## 2023-06-19 ENCOUNTER — OFFICE VISIT (OUTPATIENT)
Dept: INTERNAL MEDICINE | Facility: CLINIC | Age: 63
End: 2023-06-19
Payer: COMMERCIAL

## 2023-06-19 ENCOUNTER — PATIENT MESSAGE (OUTPATIENT)
Dept: HEMATOLOGY/ONCOLOGY | Facility: CLINIC | Age: 63
End: 2023-06-19
Payer: COMMERCIAL

## 2023-06-19 VITALS
RESPIRATION RATE: 18 BRPM | WEIGHT: 183.44 LBS | HEART RATE: 88 BPM | DIASTOLIC BLOOD PRESSURE: 70 MMHG | HEIGHT: 69 IN | OXYGEN SATURATION: 97 % | SYSTOLIC BLOOD PRESSURE: 128 MMHG | BODY MASS INDEX: 27.17 KG/M2

## 2023-06-19 DIAGNOSIS — C90.01 MULTIPLE MYELOMA IN REMISSION: ICD-10-CM

## 2023-06-19 DIAGNOSIS — Z00.00 ROUTINE GENERAL MEDICAL EXAMINATION AT A HEALTH CARE FACILITY: Primary | ICD-10-CM

## 2023-06-19 DIAGNOSIS — I10 ESSENTIAL HYPERTENSION: ICD-10-CM

## 2023-06-19 PROCEDURE — 1160F PR REVIEW ALL MEDS BY PRESCRIBER/CLIN PHARMACIST DOCUMENTED: ICD-10-PCS | Mod: CPTII,S$GLB,, | Performed by: INTERNAL MEDICINE

## 2023-06-19 PROCEDURE — 1160F RVW MEDS BY RX/DR IN RCRD: CPT | Mod: CPTII,S$GLB,, | Performed by: INTERNAL MEDICINE

## 2023-06-19 PROCEDURE — 90677 PCV20 VACCINE IM: CPT | Mod: S$GLB,,, | Performed by: INTERNAL MEDICINE

## 2023-06-19 PROCEDURE — 1159F MED LIST DOCD IN RCRD: CPT | Mod: CPTII,S$GLB,, | Performed by: INTERNAL MEDICINE

## 2023-06-19 PROCEDURE — 1159F PR MEDICATION LIST DOCUMENTED IN MEDICAL RECORD: ICD-10-PCS | Mod: CPTII,S$GLB,, | Performed by: INTERNAL MEDICINE

## 2023-06-19 PROCEDURE — 3074F SYST BP LT 130 MM HG: CPT | Mod: CPTII,S$GLB,, | Performed by: INTERNAL MEDICINE

## 2023-06-19 PROCEDURE — 3008F PR BODY MASS INDEX (BMI) DOCUMENTED: ICD-10-PCS | Mod: CPTII,S$GLB,, | Performed by: INTERNAL MEDICINE

## 2023-06-19 PROCEDURE — 99386 PR PREVENTIVE VISIT,NEW,40-64: ICD-10-PCS | Mod: 25,S$GLB,, | Performed by: INTERNAL MEDICINE

## 2023-06-19 PROCEDURE — 90471 IMMUNIZATION ADMIN: CPT | Mod: S$GLB,,, | Performed by: INTERNAL MEDICINE

## 2023-06-19 PROCEDURE — 99386 PREV VISIT NEW AGE 40-64: CPT | Mod: 25,S$GLB,, | Performed by: INTERNAL MEDICINE

## 2023-06-19 PROCEDURE — 3078F DIAST BP <80 MM HG: CPT | Mod: CPTII,S$GLB,, | Performed by: INTERNAL MEDICINE

## 2023-06-19 PROCEDURE — 99999 PR PBB SHADOW E&M-EST. PATIENT-LVL V: ICD-10-PCS | Mod: PBBFAC,,, | Performed by: INTERNAL MEDICINE

## 2023-06-19 PROCEDURE — 3078F PR MOST RECENT DIASTOLIC BLOOD PRESSURE < 80 MM HG: ICD-10-PCS | Mod: CPTII,S$GLB,, | Performed by: INTERNAL MEDICINE

## 2023-06-19 PROCEDURE — 90677 PNEUMOCOCCAL CONJUGATE VACCINE 20-VALENT: ICD-10-PCS | Mod: S$GLB,,, | Performed by: INTERNAL MEDICINE

## 2023-06-19 PROCEDURE — 3074F PR MOST RECENT SYSTOLIC BLOOD PRESSURE < 130 MM HG: ICD-10-PCS | Mod: CPTII,S$GLB,, | Performed by: INTERNAL MEDICINE

## 2023-06-19 PROCEDURE — 99999 PR PBB SHADOW E&M-EST. PATIENT-LVL V: CPT | Mod: PBBFAC,,, | Performed by: INTERNAL MEDICINE

## 2023-06-19 PROCEDURE — 3008F BODY MASS INDEX DOCD: CPT | Mod: CPTII,S$GLB,, | Performed by: INTERNAL MEDICINE

## 2023-06-19 PROCEDURE — 90471 PNEUMOCOCCAL CONJUGATE VACCINE 20-VALENT: ICD-10-PCS | Mod: S$GLB,,, | Performed by: INTERNAL MEDICINE

## 2023-06-19 RX ORDER — HYDROCHLOROTHIAZIDE 12.5 MG/1
12.5 TABLET ORAL DAILY
Qty: 90 TABLET | Refills: 11 | Status: SHIPPED | OUTPATIENT
Start: 2023-06-19 | End: 2023-12-20

## 2023-06-19 NOTE — TELEPHONE ENCOUNTER
Administered PREVNAR-20 IM RT deltoid. Two patient identifier (name&) confirmed. Patient advised to wait 15 mins tolerated well.-Nancy UMANA MA

## 2023-06-19 NOTE — PROGRESS NOTES
Subjective:       Patient ID: Vicente Connors is a 62 y.o. male.    Chief Complaint: Hypertension and Establish Care    Here for annual exam/est care.    BPs fluctuate, has been elevated for at least 3 years. How pressures brock too.    Bowels fluctuate also, from 2-5 BMs per day, no blood in stools. He cannot tell if any specific foods affect his bowels.    Has myeloma, sees oncologist almost monthly.    Review of Systems   Constitutional:  Negative for appetite change and unexpected weight change.   Respiratory:  Negative for chest tightness and shortness of breath.    Cardiovascular:  Negative for chest pain.   Gastrointestinal:  Negative for abdominal pain.   Genitourinary:  Negative for difficulty urinating, scrotal swelling and testicular pain.   Skin:         No lesions         Objective:      Physical Exam  Vitals reviewed.   Constitutional:       General: He is not in acute distress.     Appearance: Normal appearance. He is well-developed. He is not ill-appearing, toxic-appearing or diaphoretic.   HENT:      Head: Normocephalic and atraumatic.   Eyes:      General: No scleral icterus.  Neck:      Thyroid: No thyromegaly.   Cardiovascular:      Rate and Rhythm: Normal rate and regular rhythm.      Heart sounds: Normal heart sounds. No murmur heard.    No friction rub. No gallop.   Pulmonary:      Effort: Pulmonary effort is normal. No respiratory distress.      Breath sounds: Normal breath sounds. No wheezing or rales.   Abdominal:      General: Bowel sounds are normal. There is no distension.      Palpations: Abdomen is soft. There is no mass.      Tenderness: There is no abdominal tenderness. There is no guarding or rebound.   Musculoskeletal:         General: Normal range of motion.      Cervical back: Normal range of motion.   Lymphadenopathy:      Cervical: No cervical adenopathy.   Skin:     Findings: No lesion.   Neurological:      Mental Status: He is alert and oriented to person, place, and time.    Psychiatric:         Mood and Affect: Mood normal.         Behavior: Behavior normal.         Thought Content: Thought content normal.       Assessment:       1. Routine general medical examination at a health care facility    2. Essential hypertension    3. Multiple myeloma in remission        Plan:       Vicente was seen today for hypertension and establish care.    Diagnoses and all orders for this visit:    Routine general medical examination at a health care facility    Essential hypertension  -     hydroCHLOROthiazide (HYDRODIURIL) 12.5 MG Tab; Take 1 tablet (12.5 mg total) by mouth once daily.  He will have K check and BP check at oncology  elevated BP: requested that pt check his BP twice per week at [facility]/other pharmacy, record the numbers, and call back if routinely >130/85, reiterated need for weight loss and salt restriction        Multiple myeloma in remission  Has oncologist, currently no symptoms of MM    Bowel irregularity -- he will try to eliminate dairy one wk and look at if any specific foods bring on symptoms. He has tried benefiber w/o relief, will try metamucil.  Offered gastro referral, he declines for now, will call if symptoms worsen    Health Maintenance         Date Due Completion Date    Hepatitis C Screening Never done ---    HIV Screening Never done ---    TETANUS VACCINE Never done ---    Shingles Vaccine (1 of 2) Never done ---    COVID-19 Vaccine (3 - Pfizer series) 11/04/2021 9/9/2021    Hemoglobin A1c (Diabetic Prevention Screening) 03/15/2024 3/15/2021    Lipid Panel 03/15/2026 3/15/2021    Colorectal Cancer Screening 07/11/2032 7/11/2022   He says he is UTD on shingrix, will have prevnar 20 today, claims HIV/Hep neg in past  Has had recent covid infection         Follow up in about 6 months (around 12/19/2023).    Future Appointments   Date Time Provider Department Center   7/5/2023  7:15 AM LAB, [facility]   7/5/2023  8:30 AM CHEMO 05 [facility]   7/31/2023  9:30 AM LAB, [facility]   8/2/2023  9:30 AM Ranjana Francis NP [facility]   8/2/2023 10:00 AM NURSE 6, [facility]   8/28/2023  9:00 AM LAB, [facility]   8/30/2023 10:00 AM NURSE 6, [facility]   12/20/2023  1:00 PM Norris Pantoja MD [facility] [place] [facility]

## 2023-06-20 DIAGNOSIS — C90.01 MULTIPLE MYELOMA IN REMISSION: ICD-10-CM

## 2023-06-20 RX ORDER — LENALIDOMIDE 10 MG/1
1 CAPSULE ORAL SEE ADMIN INSTRUCTIONS
Qty: 21 EACH | Refills: 0 | Status: SHIPPED | OUTPATIENT
Start: 2023-06-20 | End: 2023-07-19 | Stop reason: SDUPTHER

## 2023-06-20 RX ORDER — LENALIDOMIDE 10 MG/1
1 CAPSULE ORAL SEE ADMIN INSTRUCTIONS
Qty: 21 EACH | Refills: 0 | Status: SHIPPED | OUTPATIENT
Start: 2023-06-20 | End: 2023-06-20 | Stop reason: SDUPTHER

## 2023-06-26 DIAGNOSIS — C90.01 MULTIPLE MYELOMA IN REMISSION: Primary | ICD-10-CM

## 2023-06-27 ENCOUNTER — PATIENT MESSAGE (OUTPATIENT)
Dept: HEMATOLOGY/ONCOLOGY | Facility: CLINIC | Age: 63
End: 2023-06-27
Payer: COMMERCIAL

## 2023-06-27 DIAGNOSIS — C90.01 MULTIPLE MYELOMA IN REMISSION: Primary | ICD-10-CM

## 2023-06-27 RX ORDER — SULFAMETHOXAZOLE AND TRIMETHOPRIM 800; 160 MG/1; MG/1
TABLET ORAL
Qty: 15 TABLET | Refills: 3 | Status: CANCELLED | OUTPATIENT
Start: 2023-06-27

## 2023-06-27 NOTE — TELEPHONE ENCOUNTER
----- Message from Shima Barajas sent at 6/27/2023  9:14 AM CDT -----  Regarding: Refill  Contact: Pt [phone]              Rx Name:  sulfamethoxazole-trimethoprim 800-160mg (BACTRIM DS) 800-160 mg Tab      Preferred Pharmacy with number:    [facility] #[id number] [place] [address] [address] [facility] [place] [address] [address] [facility] [address]  Phone: [phone] Fax: [phone]        Ordering Provider:  Cris Burt MD     Contact preference:    [phone]    Comments/Notes:  Called to request refill

## 2023-06-29 ENCOUNTER — TELEPHONE (OUTPATIENT)
Dept: HEMATOLOGY/ONCOLOGY | Facility: CLINIC | Age: 63
End: 2023-06-29
Payer: COMMERCIAL

## 2023-06-29 RX ORDER — SULFAMETHOXAZOLE AND TRIMETHOPRIM 800; 160 MG/1; MG/1
TABLET ORAL
Qty: 36 TABLET | Refills: 3 | Status: SHIPPED | OUTPATIENT
Start: 2023-06-29

## 2023-06-29 NOTE — TELEPHONE ENCOUNTER
----- Message from Tremontana Chevalier sent at 6/29/2023  9:31 AM CDT -----  Regarding: New RX  Vicente Connors calling again regarding new RX below:    Rx Name: sulfamethoxazole-trimethoprim 800-160mg (BACTRIM DS) 800-160 mg Tab      Preferred Pharmacy with number:  [facility] #[id number] [place] [address] [address] [facility] [place] [address] [address] [facility] [address]  Phone: [phone] Fax: [phone]    Ordering Provider: Cris Burt MD    Contact preference: [phone]    Comments/Notes: Called to request refill

## 2023-07-05 ENCOUNTER — INFUSION (OUTPATIENT)
Dept: INFUSION THERAPY | Facility: OTHER | Age: 63
End: 2023-07-05
Attending: INTERNAL MEDICINE
Payer: COMMERCIAL

## 2023-07-05 VITALS
DIASTOLIC BLOOD PRESSURE: 82 MMHG | OXYGEN SATURATION: 97 % | SYSTOLIC BLOOD PRESSURE: 149 MMHG | HEART RATE: 62 BPM | RESPIRATION RATE: 16 BRPM | WEIGHT: 184.06 LBS | BODY MASS INDEX: 27.26 KG/M2 | HEIGHT: 69 IN

## 2023-07-05 DIAGNOSIS — C90.01 MULTIPLE MYELOMA IN REMISSION: Primary | ICD-10-CM

## 2023-07-05 PROCEDURE — 25000003 PHARM REV CODE 250: Performed by: INTERNAL MEDICINE

## 2023-07-05 PROCEDURE — 96365 THER/PROPH/DIAG IV INF INIT: CPT

## 2023-07-05 PROCEDURE — 63600175 PHARM REV CODE 636 W HCPCS: Mod: JW,JG | Performed by: INTERNAL MEDICINE

## 2023-07-05 PROCEDURE — 96401 CHEMO ANTI-NEOPL SQ/IM: CPT

## 2023-07-05 RX ORDER — SODIUM CHLORIDE 0.9 % (FLUSH) 0.9 %
10 SYRINGE (ML) INJECTION
Status: DISCONTINUED | OUTPATIENT
Start: 2023-07-05 | End: 2023-07-05 | Stop reason: HOSPADM

## 2023-07-05 RX ORDER — LENALIDOMIDE 10 MG/1
1 CAPSULE ORAL SEE ADMIN INSTRUCTIONS
Qty: 21 EACH | Refills: 0 | Status: SHIPPED | OUTPATIENT
Start: 2023-07-05 | End: 2023-07-16 | Stop reason: SDUPTHER

## 2023-07-05 RX ORDER — HEPARIN 100 UNIT/ML
500 SYRINGE INTRAVENOUS
Status: DISCONTINUED | OUTPATIENT
Start: 2023-07-05 | End: 2023-07-05 | Stop reason: HOSPADM

## 2023-07-05 RX ORDER — BORTEZOMIB 3.5 MG/1
1.3 INJECTION, POWDER, LYOPHILIZED, FOR SOLUTION INTRAVENOUS; SUBCUTANEOUS
Status: COMPLETED | OUTPATIENT
Start: 2023-07-05 | End: 2023-07-05

## 2023-07-05 RX ADMIN — SODIUM CHLORIDE: 9 INJECTION, SOLUTION INTRAVENOUS at 08:07

## 2023-07-05 RX ADMIN — ZOLEDRONIC ACID 4 MG: 4 INJECTION, SOLUTION, CONCENTRATE INTRAVENOUS at 09:07

## 2023-07-05 RX ADMIN — BORTEZOMIB 2.5 MG: 3.5 INJECTION, POWDER, LYOPHILIZED, FOR SOLUTION INTRAVENOUS; SUBCUTANEOUS at 10:07

## 2023-07-05 NOTE — PLAN OF CARE
Vital Signs Stable, No apparent distress noted; Pt tolerated _Velcade and Zometa w/o difficulty.  AVS/Discharge instructions given;all questions answered;Pt verbalizes understanding.   Next appointments scheduled and sent via [facility]; ambulated from clinic in NAD

## 2023-07-06 ENCOUNTER — PATIENT MESSAGE (OUTPATIENT)
Dept: HEMATOLOGY/ONCOLOGY | Facility: CLINIC | Age: 63
End: 2023-07-06
Payer: COMMERCIAL

## 2023-07-06 ENCOUNTER — TELEPHONE (OUTPATIENT)
Dept: HEMATOLOGY/ONCOLOGY | Facility: CLINIC | Age: 63
End: 2023-07-06
Payer: COMMERCIAL

## 2023-07-06 NOTE — TELEPHONE ENCOUNTER
"----- Message from Mae Fernando sent at 7/6/2023  2:25 PM CDT -----  Reschedule Existing Appointment     Appt Date: 08/04    Type of appt: Infusion      Physician: Penny    Reason for rescheduling?  Requesting to r/s for 08/04 if possible    Caller: Self    Contact Preference: [phone]                   Additional Information:  "Thank you for all that you do for our patients"         "

## 2023-07-16 DIAGNOSIS — C90.01 MULTIPLE MYELOMA IN REMISSION: ICD-10-CM

## 2023-07-17 DIAGNOSIS — C90.01 MULTIPLE MYELOMA IN REMISSION: Primary | ICD-10-CM

## 2023-07-17 RX ORDER — LENALIDOMIDE 10 MG/1
CAPSULE ORAL
Qty: 21 EACH | Refills: 0 | Status: SHIPPED | OUTPATIENT
Start: 2023-07-17 | End: 2023-09-06

## 2023-07-17 RX ORDER — LENALIDOMIDE 10 MG/1
1 CAPSULE ORAL SEE ADMIN INSTRUCTIONS
Qty: 21 EACH | Refills: 0 | Status: SHIPPED | OUTPATIENT
Start: 2023-07-17 | End: 2023-07-21 | Stop reason: SDUPTHER

## 2023-07-19 DIAGNOSIS — C90.01 MULTIPLE MYELOMA IN REMISSION: ICD-10-CM

## 2023-07-19 NOTE — TELEPHONE ENCOUNTER
"----- Message from Mae Fernando sent at 7/19/2023 11:18 AM CDT -----  Regarding: Auth  Contact: [facility] requesting sharonda gene auth for the following lenalidomide (REVLIMID) 10 mg Cap     Confirmed contact below:   Contact Name: [facility]  Phone Number: [phone] ref # [id number]               Additional Notes:  "Thank you for all that you do for our patients"                                           "

## 2023-07-20 ENCOUNTER — PATIENT MESSAGE (OUTPATIENT)
Dept: HEMATOLOGY/ONCOLOGY | Facility: CLINIC | Age: 63
End: 2023-07-20
Payer: COMMERCIAL

## 2023-07-20 DIAGNOSIS — C90.01 MULTIPLE MYELOMA IN REMISSION: ICD-10-CM

## 2023-07-20 RX ORDER — LENALIDOMIDE 10 MG/1
1 CAPSULE ORAL SEE ADMIN INSTRUCTIONS
Qty: 21 EACH | Refills: 0 | Status: SHIPPED | OUTPATIENT
Start: 2023-07-20 | End: 2023-07-20 | Stop reason: SDUPTHER

## 2023-07-20 RX ORDER — LENALIDOMIDE 10 MG/1
1 CAPSULE ORAL SEE ADMIN INSTRUCTIONS
Qty: 21 EACH | Refills: 0 | Status: SHIPPED | OUTPATIENT
Start: 2023-07-20 | End: 2023-07-21 | Stop reason: SDUPTHER

## 2023-07-20 NOTE — TELEPHONE ENCOUNTER
----- Message from Tremontana Chevalier sent at 7/20/2023 11:30 AM CDT -----  Regarding: cellgene auth  Name of Pharmacy: [facility]     Name of caller: Chaya Freeman RX:  lenalidomide (REVLIMID) 10 mg Cap 21 each 0 7/20/2023  No  Sig - Route: Take 1 capsule by mouth As instructed (take 1 capsule on days 1-21 of a 28 day cycle). - Oral  Sent to pharmacy as: lenalidomide (REVLIMID) 10 mg Cap    Reason for Call: caller danielle needing cellgene auth/ref [id number]    Callback number: [facility] @ [phone] / fax

## 2023-07-21 DIAGNOSIS — C90.01 MULTIPLE MYELOMA IN REMISSION: ICD-10-CM

## 2023-07-21 RX ORDER — LENALIDOMIDE 10 MG/1
1 CAPSULE ORAL SEE ADMIN INSTRUCTIONS
Qty: 21 EACH | Refills: 0 | Status: SHIPPED | OUTPATIENT
Start: 2023-07-21 | End: 2023-08-10 | Stop reason: SDUPTHER

## 2023-07-21 RX ORDER — LENALIDOMIDE 10 MG/1
1 CAPSULE ORAL SEE ADMIN INSTRUCTIONS
Qty: 21 EACH | Refills: 0 | Status: SHIPPED | OUTPATIENT
Start: 2023-07-21 | End: 2023-10-12

## 2023-07-21 NOTE — TELEPHONE ENCOUNTER
"----- Message from Mae Fernando sent at 7/21/2023 11:44 AM CDT -----  Regarding: Pt advice  Contact: Pt    Pt requesting call back in regards to lenalidomide (REVLIMID) 10 mg Cap. Pt was informed that the auth number for med was incorrect. [facility] informed pt they would need more information within 24 hours or meds would be canceled  Please call and adv @          Confirmed contact below:   Contact Name: Vicente Connors  Phone Number: [phone]               Additional Notes:  "Thank you for all that you do for our patients"                                           "

## 2023-07-24 ENCOUNTER — TELEPHONE (OUTPATIENT)
Dept: HEMATOLOGY/ONCOLOGY | Facility: CLINIC | Age: 63
End: 2023-07-24
Payer: COMMERCIAL

## 2023-07-24 ENCOUNTER — PATIENT MESSAGE (OUTPATIENT)
Dept: HEMATOLOGY/ONCOLOGY | Facility: CLINIC | Age: 63
End: 2023-07-24
Payer: COMMERCIAL

## 2023-07-25 ENCOUNTER — TELEPHONE (OUTPATIENT)
Dept: HEMATOLOGY/ONCOLOGY | Facility: CLINIC | Age: 63
End: 2023-07-25
Payer: COMMERCIAL

## 2023-08-04 ENCOUNTER — LAB VISIT (OUTPATIENT)
Dept: LAB | Facility: OTHER | Age: 63
End: 2023-08-04
Attending: INTERNAL MEDICINE
Payer: COMMERCIAL

## 2023-08-04 DIAGNOSIS — C90.01 MULTIPLE MYELOMA IN REMISSION: ICD-10-CM

## 2023-08-04 DIAGNOSIS — C90.00 IGA MYELOMA: ICD-10-CM

## 2023-08-04 LAB
ALBUMIN SERPL BCP-MCNC: 3.5 G/DL (ref 3.5–5.2)
ALP SERPL-CCNC: 61 U/L (ref 55–135)
ALT SERPL W/O P-5'-P-CCNC: 36 U/L (ref 10–44)
ANION GAP SERPL CALC-SCNC: 8 MMOL/L (ref 8–16)
AST SERPL-CCNC: 34 U/L (ref 10–40)
BASOPHILS # BLD AUTO: 0.02 K/UL (ref 0–0.2)
BASOPHILS NFR BLD: 0.4 % (ref 0–1.9)
BILIRUB SERPL-MCNC: 1.9 MG/DL (ref 0.1–1)
BUN SERPL-MCNC: 11 MG/DL (ref 8–23)
CALCIUM SERPL-MCNC: 8.6 MG/DL (ref 8.7–10.5)
CHLORIDE SERPL-SCNC: 105 MMOL/L (ref 95–110)
CO2 SERPL-SCNC: 25 MMOL/L (ref 23–29)
CREAT SERPL-MCNC: 0.9 MG/DL (ref 0.5–1.4)
DIFFERENTIAL METHOD: ABNORMAL
EOSINOPHIL # BLD AUTO: 0 K/UL (ref 0–0.5)
EOSINOPHIL NFR BLD: 0.8 % (ref 0–8)
ERYTHROCYTE [DISTWIDTH] IN BLOOD BY AUTOMATED COUNT: 14.7 % (ref 11.5–14.5)
EST. GFR  (NO RACE VARIABLE): >60 ML/MIN/1.73 M^2
GLUCOSE SERPL-MCNC: 106 MG/DL (ref 70–110)
HCT VFR BLD AUTO: 39.6 % (ref 40–54)
HGB BLD-MCNC: 13.3 G/DL (ref 14–18)
IGA SERPL-MCNC: 175 MG/DL (ref 40–350)
IGG SERPL-MCNC: 726 MG/DL (ref 650–1600)
IGM SERPL-MCNC: 17 MG/DL (ref 50–300)
IMM GRANULOCYTES # BLD AUTO: 0.01 K/UL (ref 0–0.04)
IMM GRANULOCYTES NFR BLD AUTO: 0.2 % (ref 0–0.5)
LYMPHOCYTES # BLD AUTO: 1.1 K/UL (ref 1–4.8)
LYMPHOCYTES NFR BLD: 22.3 % (ref 18–48)
MAGNESIUM SERPL-MCNC: 1.8 MG/DL (ref 1.6–2.6)
MCH RBC QN AUTO: 32.2 PG (ref 27–31)
MCHC RBC AUTO-ENTMCNC: 33.6 G/DL (ref 32–36)
MCV RBC AUTO: 96 FL (ref 82–98)
MONOCYTES # BLD AUTO: 0.3 K/UL (ref 0.3–1)
MONOCYTES NFR BLD: 6.3 % (ref 4–15)
NEUTROPHILS # BLD AUTO: 3.4 K/UL (ref 1.8–7.7)
NEUTROPHILS NFR BLD: 70 % (ref 38–73)
NRBC BLD-RTO: 0 /100 WBC
PHOSPHATE SERPL-MCNC: 2.6 MG/DL (ref 2.7–4.5)
PLATELET # BLD AUTO: 131 K/UL (ref 150–450)
PMV BLD AUTO: 9.7 FL (ref 9.2–12.9)
POTASSIUM SERPL-SCNC: 3.8 MMOL/L (ref 3.5–5.1)
PROT SERPL-MCNC: 6.3 G/DL (ref 6–8.4)
RBC # BLD AUTO: 4.13 M/UL (ref 4.6–6.2)
SODIUM SERPL-SCNC: 138 MMOL/L (ref 136–145)
WBC # BLD AUTO: 4.79 K/UL (ref 3.9–12.7)

## 2023-08-04 PROCEDURE — 84165 PROTEIN E-PHORESIS SERUM: CPT | Mod: 26,,, | Performed by: PATHOLOGY

## 2023-08-04 PROCEDURE — 85025 COMPLETE CBC W/AUTO DIFF WBC: CPT | Performed by: INTERNAL MEDICINE

## 2023-08-04 PROCEDURE — 86334 IMMUNOFIX E-PHORESIS SERUM: CPT | Mod: 26,,, | Performed by: PATHOLOGY

## 2023-08-04 PROCEDURE — 84165 PROTEIN E-PHORESIS SERUM: CPT | Performed by: INTERNAL MEDICINE

## 2023-08-04 PROCEDURE — 80053 COMPREHEN METABOLIC PANEL: CPT | Performed by: INTERNAL MEDICINE

## 2023-08-04 PROCEDURE — 36415 COLL VENOUS BLD VENIPUNCTURE: CPT | Performed by: INTERNAL MEDICINE

## 2023-08-04 PROCEDURE — 86334 IMMUNOFIX E-PHORESIS SERUM: CPT | Performed by: INTERNAL MEDICINE

## 2023-08-04 PROCEDURE — 82784 ASSAY IGA/IGD/IGG/IGM EACH: CPT | Mod: 59 | Performed by: INTERNAL MEDICINE

## 2023-08-04 PROCEDURE — 83521 IG LIGHT CHAINS FREE EACH: CPT | Performed by: INTERNAL MEDICINE

## 2023-08-04 PROCEDURE — 86334 PATHOLOGIST INTERPRETATION IFE: ICD-10-PCS | Mod: 26,,, | Performed by: PATHOLOGY

## 2023-08-04 PROCEDURE — 84165 PATHOLOGIST INTERPRETATION SPE: ICD-10-PCS | Mod: 26,,, | Performed by: PATHOLOGY

## 2023-08-04 PROCEDURE — 83735 ASSAY OF MAGNESIUM: CPT | Performed by: INTERNAL MEDICINE

## 2023-08-04 PROCEDURE — 84100 ASSAY OF PHOSPHORUS: CPT | Performed by: INTERNAL MEDICINE

## 2023-08-06 NOTE — PROGRESS NOTES
Chief Complaint : Multiple myeloma, s/p tandem auto SCT, on VRd maintenance, hematology follow up      History of Present Illness : Vicente Connors is a 62 y.o. male here for hematology follow up. He has recently moved to [place] from [place]. He has history of stage IIIA, [employer] stage II IgA kappa multiple myeloma, which is likely intermediate-risk based upon the presence of t(4;14) with hyperdiploidy, based on records available through care everywhere.    IgA Kappa multiple Myeloma was diagnosed in 2014.   He was started on velcade, revlimid and dexamethasone at diagnosis.   Of note, he developed a popliteal DVT and was started on Lovenox and later switched Xarelto.   He completed a stem cell transplant with Dr Schuster in May 2015. He is s/p  tandem autologous transplant in 2015 and has been on triple maintenance with bortezomib, lenalidomide and dexamethasone since then.   He also has peripheral neuropathy, h/o LE DVT on chronic anti-coagulation, GERD, vertebral fracture. He has history of DVT and remains on chronic anticoagulation.  He has had multiple skeletal complications since his original diagnosis.      Interval History:  is here for a follow up visit. He had prolonged bout of diarrhea in January 2023. All stool studies were negative/ unremarkable.  He had COVID 19 infection in March 2023. He received paxlovid. He had MRI spine and hips.  Today he presents for follow up. Occasional upper extremity spasticity, no issues now.  No other complaints. He have upcoming  trip in 2 weeks, he will back in town on Sep 2nd/2023. Updated with covid vaccines. Advised to adhere with safety precautions.       Review of patient's allergies indicates:  No Known Allergies        Current Outpatient Medications   Medication Sig    acyclovir (ZOVIRAX) 400 MG tablet Take 1 tablet (400 mg total) by mouth 2 (two) times daily.    bortezomib (VELCADE INJ) Inject as directed. Pt gets every  month    calcium carb/vit D3/minerals (CALCIUM-VITAMIN D ORAL)     dexAMETHasone (DECADRON) 4 MG Tab TAKE 10 TABLETS (40 MG DOSE) BY MOUTH DAY OF AND DAY AFTER VELCADE once monthly    folic acid-vit B6-vit B12 2.5-25-2 mg (FOLBIC) 2.5-25-2 mg Tab Take 1 tablet by mouth once daily.    hydroCHLOROthiazide (HYDRODIURIL) 12.5 MG Tab Take 1 tablet (12.5 mg total) by mouth once daily.    Lactobacillus acidophilus (PROBIOTIC ORAL) Take by mouth. 24 billion CFU    lenalidomide (REVLIMID) 10 mg Cap Take 1 capsule by mouth As instructed (take 1 capsule on days 1-21 of a 28 day cycle).    lenalidomide 10 mg Cap Take 1 capsule by mouth As instructed Take 1 capsule on days 1-21 of a 28 day cycle.    magnesium oxide 500 mg Tab once daily.    multivitamin with minerals (MULTI-VITAMIN W/MINERALS ORAL)     omeprazole (PRILOSEC) 20 MG capsule     rivaroxaban (XARELTO) 15 mg Tab Take 1 tablet (15 mg total) by mouth once daily.    sulfamethoxazole-trimethoprim 800-160mg (BACTRIM DS) 800-160 mg Tab TAKE 1 TABLET EVERY MONDAY, WEDNESDAY AND FRIDAY    zoledronic acid (ZOMETA IV) Inject into the vein. Pt gets every 3 months    lenalidomide (REVLIMID) 10 mg Cap TAKE 1 CAPSULE DAILY AS INSTRUCTED ON DAYS 1 THROUGH 21 OF A 28 DAY CYCLE    lenalidomide 10 mg Cap Take 1 capsule by mouth As instructed.     No current facility-administered medications for this visit.      Review of Systems   Constitutional:  Positive for malaise/fatigue. Negative for chills, fever and weight loss.   HENT:  Negative for congestion, ear pain and sinus pain.    Eyes:  Negative for double vision, photophobia and pain.   Respiratory:  Negative for sputum production and stridor.    Cardiovascular:  Negative for chest pain, palpitations, orthopnea and claudication.   Gastrointestinal:  Negative for blood in stool, constipation, diarrhea, melena and nausea.   Genitourinary:  Negative for dysuria, frequency and urgency.   Musculoskeletal:  Negative for myalgias and neck  pain.   Neurological:  Positive for tingling. Negative for dizziness, tremors and speech change.   Endo/Heme/Allergies:  Negative for environmental allergies. Does not bruise/bleed easily.   Psychiatric/Behavioral:  Negative for substance abuse and suicidal ideas.         Vitals:    08/07/23 0724   BP: 139/82   Pulse: 69   Resp: 16   Temp: 98.4 °F (36.9 °C)           PS: ECOG 1    Physical Exam  HENT:      Head: Normocephalic and atraumatic.      Mouth/Throat:      Pharynx: No posterior oropharyngeal erythema.   Eyes:      General: No scleral icterus.  Cardiovascular:      Rate and Rhythm: Normal rate and regular rhythm.   Pulmonary:      Effort: Pulmonary effort is normal. No respiratory distress.      Breath sounds: Normal breath sounds.   Abdominal:      General: There is no distension.      Palpations: There is no mass.      Tenderness: There is no abdominal tenderness.   Musculoskeletal:         General: No swelling.   Lymphadenopathy:      Cervical: No cervical adenopathy.   Neurological:      General: No focal deficit present.      Mental Status: He is alert and oriented to person, place, and time.      Cranial Nerves: No cranial nerve deficit.              1/9/2021 MRI Pelvis    IMPRESSION:   DIFFUSE PELVIC, PROXIMAL FEMORAL AND LOWER LUMBAR HETEROGENEOUS MARROW SIGNAL ABNORMALITY COULD RELATE TO HEMATOPOIETIC RED MARROW RECONVERSION. INFILTRATIVE MYELOMA CANNOT BE EXCLUDED. THERE IS A MORE DISCRETE 1.2 CM ENHANCING LESION IN THE LEFT FEMORAL HEAD THAT COULD ALSO REPRESENT AN ISLAND OF HEMATOPOIETIC RED MARROW. HOWEVER, CONTINUED ATTENTION ON FOLLOW-UP IS RECOMMENDED.    1/9/2021 MRI Lumbar spine/THoracic/Cervical  IMPRESSION:     1. There is interval increase in T2/STIR hyperintensity with enhancement extending from the left pedicle into the articular pillar and left transverse process of the T1 vertebra, although there is no definite decrease in the intrinsic T1 hypointense signal in this location. This  may also be a degenerative process, although focal myeloma is not definitely excluded. Clinical correlation is recommended, and follow-up is suggested on future examinations.      2. There has been interval increase in STIR hyperintensity and enhancement along the posterior endplates at the T1-T2 level, most likely progressive degenerative endplate marrow signal changes.     3. No other focal suspicious STIR hyperintense enhancing signal abnormalities are identified.     4. Stable multilevel compression fracture deformities      11/25/2020 Bone survey    IMPRESSION:   1. No lytic or blastic lesions are noted.      2. Stable compression fractures, as above.     3. Chronic changes, as above.       10/19/22 SPEP: Normal total protein, normal pattern.   10/19/22 sIFE: No monoclonal peaks identified.   Component      Latest Ref Rng & Units 2/2/2023   [place] Free Light Chains      0.33 - 1.94 mg/dL 1.88   [place] Free Light Chains      0.57 - 2.63 mg/dL 1.08   Kappa/Lambda FLC Ratio      0.26 - 1.65 1.74 (H)       Component      Latest Ref Rng & Units 3/2/2023   [place] Free Light Chains      0.33 - 1.94 mg/dL 1.79   Lambda Free Light Chains      0.57 - 2.63 mg/dL 1.18   Kappa/Lambda FLC Ratio      0.26 - 1.65 1.52     3/2/23 SPEP: Normal total protein, normal pattern  3/2/23 sIFE: No monoclonal peaks identified.         3/9/23 MRI spine    FINDINGS:  Cervical spine:     Alignment: Normal.     Vertebrae: No fracture or marrow replacement process.     Discs: Multilevel mild disc height loss and desiccation.     Cord: Normal cord signal.     Craniocervical junction: Unremarkable.     C1-C2: Unremarkable.     Degenerative findings:     C2-C3: Small posterior disc osteophyte complex and bilateral facet arthropathy resulting in mild bilateral neural foraminal narrowing.  No significant spinal canal stenosis.     C3-C4: Posterior disc osteophyte complex and bilateral facet arthropathy resulting in moderate left and mild right  neural foraminal narrowing.  Mild effacement of the anterior thecal sac without significant spinal canal stenosis.     C4-C5: Posterior disc osteophyte complex and bilateral facet arthropathy resulting in moderate predominantly left neural foraminal narrowing.  Effacement of the anterior thecal sac consistent with mild spinal canal stenosis.     C5-C6: Posterior disc osteophyte complex and bilateral uncovertebral spurring resulting in moderate bilateral neural foraminal narrowing.  Effacement of the anterior thecal sac consistent with mild spinal canal stenosis.     C6-C7: Posterior disc osteophyte complex and bilateral uncovertebral spurring findings result in moderate bilateral neural foraminal narrowing.  Effacement of the anterior thecal sac consistent with mild spinal canal stenosis.     C7-T1: No spinal canal stenosis or neural foraminal narrowing.     Paraspinal muscles & soft tissues: Normal.     Enhancement: No abnormal areas of enhancement.     Thoracic Spine:     Alignment: Normal.     Vertebrae: No infiltrative marrow replacing process.  T2 hyperintense lesions consistent with probable hemangiomas of the anterior vertebral body of T4 and posterior and mid vertebral body of T7.  Mild height loss of the superior endplate of T8 and T11.     Discs: Multilevel mild disc height loss and desiccation.     Cord: Normal contour and signal.     Degenerative findings: No significant spinal canal stenosis or neural foraminal narrowing.     Soft tissue: Unremarkable.     Enhancement: No abnormal areas of enhancement.     Lumbar spine:     Alignment: Normal.     Vertebrae: No infiltrative marrow replacing process.  Compression fracture of L1 with retropulsion of posterior fragment approximately 0.6 cm which abuts the conus medullaris without causing deformity or signal abnormality.     Discs: Normal height and signal.     Cord: Multilevel disc height loss and desiccation most pronounced at T12-L1, L1-L2, and L4-L5.      Degenerative findings:     T12-L1: Central disc protrusion.  No spinal canal stenosis or neural foraminal narrowing.     L1-L2: Circumferential disc bulge resulting in near complete effacement of the bilateral perineural fat, right greater than left, consistent with moderate bilateral neural foraminal narrowing.  No significant spinal canal stenosis.     L2-L3: Circumferential disc bulge, bilateral facet arthropathy, and ligamentum flavum buckling findings result in partial effacement of the right perineural fat consistent with moderate right neural foraminal narrowing.  There is lateral effacement of the thecal sac and crowding of the cauda equina nerves with minimal residual CSF, consistent with moderate spinal canal stenosis.     L3-L4: Circumferential disc bulge, bilateral facet arthropathy, and ligamentum flavum buckling.  No spinal canal stenosis or neural foraminal narrowing.     L4-L5: Circumferential disc bulge, bilateral facet arthropathy, and ligamentum flavum buckling, findings result in minimal effacement of the perineural fat, consistent with mild bilateral neural foraminal narrowing.  Mild effacement of the right lateral thecal sac.     L5-S1: Bilateral facet arthropathy.  No spinal canal stenosis or neural foraminal narrowing.     Upper SI joints/sacrum: Unremarkable.     Soft tissue: Unremarkable.     Enhancement: No abnormal areas of enhancement.     Impression:     1. No findings concerning for multiple myeloma lesion.  2. Compression fracture of L1 with retropulsion of the posterior fragment.  3. Additional minimal compression fractures of the superior endplates of T8 and T11.  4. Degenerative changes of the cervical, thoracic, and lumbar spine, as detailed above.    3/20/23 MRI pelvis      FINDINGS:  No masses or fluid collections. No pelvic ascites or lymphadenopathy.     Visualized bowel loops are unremarkable.  Urinary bladder is decompressed, noting circumferential wall thickening.   Prostate demonstrates heterogeneous signal, likely BPH.     Regional muscles and tendons are unremarkable.     Bone marrow signal is maintained. No fracture, focal lesion or marrow infiltrative process.     Note made of facet arthropathy in the lower lumbar spine.  Hip joints exhibit bilateral chondral thinning with labral fraying and osteophyte production.     Postoperative changes suggesting prior bilateral herniorrhaphy.     Impression:     1. No focal marrow lesion or diffuse infiltrative process.  2. Additional findings detailed above.    3/31/23 SPEP: Normal total protein, normal pattern.   3/31/23 sIFE: No monoclonal peaks identified.     Lab Results   Component Value Date    WBC 4.79 08/04/2023    HGB 13.3 (L) 08/04/2023    HCT 39.6 (L) 08/04/2023    MCV 96 08/04/2023     (L) 08/04/2023      CMP  Sodium   Date Value Ref Range Status   08/04/2023 138 136 - 145 mmol/L Final     Potassium   Date Value Ref Range Status   08/04/2023 3.8 3.5 - 5.1 mmol/L Final     Chloride   Date Value Ref Range Status   08/04/2023 105 95 - 110 mmol/L Final     CO2   Date Value Ref Range Status   08/04/2023 25 23 - 29 mmol/L Final     Glucose   Date Value Ref Range Status   08/04/2023 106 70 - 110 mg/dL Final     BUN   Date Value Ref Range Status   08/04/2023 11 8 - 23 mg/dL Final     Creatinine   Date Value Ref Range Status   08/04/2023 0.9 0.5 - 1.4 mg/dL Final     Calcium   Date Value Ref Range Status   08/04/2023 8.6 (L) 8.7 - 10.5 mg/dL Final     Total Protein   Date Value Ref Range Status   08/04/2023 6.3 6.0 - 8.4 g/dL Final     Albumin   Date Value Ref Range Status   08/04/2023 3.5 3.5 - 5.2 g/dL Final     Total Bilirubin   Date Value Ref Range Status   08/04/2023 1.9 (H) 0.1 - 1.0 mg/dL Final     Comment:     For infants and newborns, interpretation of results should be based  on gestational age, weight and in agreement with clinical  observations.    Premature Infant recommended reference ranges:  Up to 24  hours.............<8.0 mg/dL  Up to 48 hours............<12.0 mg/dL  3-5 days..................<15.0 mg/dL  6-29 days.................<15.0 mg/dL       Alkaline Phosphatase   Date Value Ref Range Status   08/04/2023 61 55 - 135 U/L Final     AST   Date Value Ref Range Status   08/04/2023 34 10 - 40 U/L Final     ALT   Date Value Ref Range Status   08/04/2023 36 10 - 44 U/L Final     Anion Gap   Date Value Ref Range Status   08/04/2023 8 8 - 16 mmol/L Final     eGFR   Date Value Ref Range Status   08/04/2023 >60 >60 mL/min/1.73 m^2 Final     1. Multiple myeloma in remission  Rapid BMT CBC with Diff    Comprehensive Metabolic Panel    Protein Electrophoresis, Serum    Immunoglobulins (IgG, IgA, IgM) Quantitative    Immunoglobulin Free LT Chains Blood    Immunofixation Electrophoresis    Magnesium    PHOSPHORUS    DISCONTINUED: bortezomib (VELCADE) injection 2.5 mg    DISCONTINUED: sodium chloride 0.9% flush 10 mL    DISCONTINUED: heparin, porcine (PF) 100 unit/mL injection flush 500 Units    DISCONTINUED: alteplase injection 2 mg      2. Autologous bone marrow transplantation status  Magnesium    PHOSPHORUS      3. Drug-induced cytopenia        4. Cancer associated pain        5. History of DVT (deep vein thrombosis)              Assessment:    1. IgA kappa multiple myeloma in remission     -Vicente Connors is a 62 y.o.-year-old male with history of stage IIIA, ISS II IgA kappa multiple myeloma, which is likely intermediate-risk based upon the presence of t(4;14) with hyperdiploidy   -S/ p  tandem auto stem cell transplant in 2015 with a good biochemical remission  -Now on triple maintenance: velcade 1.3 mg/m2 sub q monthly, dex 40 mg PO day of and day after monthly velcade , revlimid 10 mg  PO for 21 days on/ 7 days off every 28 days  --Repeat 24 hour urine completed 4/2022  -No monoclonal protein on SPEP/sIFE done on 07/05/23; sFLC ratio normal  --He has been getting MRI spine every 2 years  -No lytic or enhancing  lesions identified on MRI whole spine done on 3/9/23  -MRI pelvis on 3/20/23 also negative for enhancing/ lytic lesion  Requested early refill for revlimid today due to upcoming foreign trip(08/07/23)    2. Normocytic anemia    --grade 1  -likely secondary to chemotherapy    3. Leukopenia    --Likely secondary to chemotherapy      4. Absolute neutropenia    --Likely secondary to chemotherapy  -Afebrile; ANC 3.4 today      5. Low Back Pain: chronic and unchanged; not on any analgesic at this time    --Spine MRI done 1/21/21, result detailed above under imaging    6. History of LE DVT:     --Continue Xarelto   --Take with largest meal of the day  --Notify office of any scheduled procedure/surgery as Xarelto will need to be interrupted   -no bleeding diathesis reported    7. Infectious disease      --Continue prophylactic bactrim and acyclovir   --Tested for measles and ok no booster recommended  --Additional tdep recommended,  last in 2011  --completed Hep B series  --recommend annual flu vaccine, received COVID 19 booster      7. Bone health:     --MRI spine1/21 neg   --Repeat 2023   --Last Zometa given 07/05/23, q3 months, Next due 10/2023    8. Diarrhea    --now resolved   -stool studies neagtive in jan 2023    9. Health Maintenance:     --Colonoscopy 2011 and 2017, repeat 2022   --PSA  up to date   --covid vaccine 3/31 pfizer booster done  --Got second booster 2/17/22    -recommend COVID booster     10. Upper extremity spasticity    - may rt to electrolyte abnormality    - levels wnl now         BMT Chart Routing      Follow up with physician .  in 8 weeks beore treatment   Follow up with [facility]    Provider visit type    Infusion scheduling note    Injection scheduling note velcade today [place] [facility] [place]. Velcade in 4 weeks after 09/02 at [facility]. velcade at [facility] [place] in 8 [place]   Labs   Scheduling:  Preferred lab:  Lab interval:  CBC, CMP, SPEP, free light chains, immunoglobulins, phosphorus and magnesium   Imaging     Pharmacy appointment    Other referrals           Advance Care Planning     Date: 08/07/2023    Patient did not wish to name a surrogate decision maker or provide an Advance Care Plan.    Ranjana Francis NP  Hematology/Oncology/BMT

## 2023-08-07 ENCOUNTER — OFFICE VISIT (OUTPATIENT)
Dept: HEMATOLOGY/ONCOLOGY | Facility: CLINIC | Age: 63
End: 2023-08-07
Payer: COMMERCIAL

## 2023-08-07 ENCOUNTER — INFUSION (OUTPATIENT)
Dept: INFUSION THERAPY | Facility: HOSPITAL | Age: 63
End: 2023-08-07
Attending: INTERNAL MEDICINE
Payer: COMMERCIAL

## 2023-08-07 VITALS
TEMPERATURE: 98 F | DIASTOLIC BLOOD PRESSURE: 77 MMHG | SYSTOLIC BLOOD PRESSURE: 138 MMHG | OXYGEN SATURATION: 98 % | RESPIRATION RATE: 18 BRPM | HEART RATE: 66 BPM

## 2023-08-07 VITALS
HEIGHT: 69 IN | SYSTOLIC BLOOD PRESSURE: 139 MMHG | RESPIRATION RATE: 16 BRPM | DIASTOLIC BLOOD PRESSURE: 82 MMHG | BODY MASS INDEX: 26.42 KG/M2 | WEIGHT: 178.38 LBS | HEART RATE: 69 BPM | OXYGEN SATURATION: 97 % | TEMPERATURE: 98 F

## 2023-08-07 DIAGNOSIS — Z86.718 HISTORY OF DVT (DEEP VEIN THROMBOSIS): ICD-10-CM

## 2023-08-07 DIAGNOSIS — G89.3 CANCER ASSOCIATED PAIN: ICD-10-CM

## 2023-08-07 DIAGNOSIS — Z94.81 AUTOLOGOUS BONE MARROW TRANSPLANTATION STATUS: ICD-10-CM

## 2023-08-07 DIAGNOSIS — D75.9 DRUG-INDUCED CYTOPENIA: ICD-10-CM

## 2023-08-07 DIAGNOSIS — T50.905A DRUG-INDUCED CYTOPENIA: ICD-10-CM

## 2023-08-07 DIAGNOSIS — C90.01 MULTIPLE MYELOMA IN REMISSION: Primary | ICD-10-CM

## 2023-08-07 LAB
ALBUMIN SERPL ELPH-MCNC: 3.52 G/DL (ref 3.35–5.55)
ALPHA1 GLOB SERPL ELPH-MCNC: 0.3 G/DL (ref 0.17–0.41)
ALPHA2 GLOB SERPL ELPH-MCNC: 0.64 G/DL (ref 0.43–0.99)
B-GLOBULIN SERPL ELPH-MCNC: 0.76 G/DL (ref 0.5–1.1)
GAMMA GLOB SERPL ELPH-MCNC: 0.67 G/DL (ref 0.67–1.58)
INTERPRETATION SERPL IFE-IMP: NORMAL
KAPPA LC SER QL IA: 1.51 MG/DL (ref 0.33–1.94)
KAPPA LC/LAMBDA SER IA: 1.57 (ref 0.26–1.65)
LAMBDA LC SER QL IA: 0.96 MG/DL (ref 0.57–2.63)
PROT SERPL-MCNC: 5.9 G/DL (ref 6–8.4)

## 2023-08-07 PROCEDURE — 3075F SYST BP GE 130 - 139MM HG: CPT | Mod: CPTII,S$GLB,, | Performed by: NURSE PRACTITIONER

## 2023-08-07 PROCEDURE — 96401 CHEMO ANTI-NEOPL SQ/IM: CPT

## 2023-08-07 PROCEDURE — 1159F PR MEDICATION LIST DOCUMENTED IN MEDICAL RECORD: ICD-10-PCS | Mod: CPTII,S$GLB,, | Performed by: NURSE PRACTITIONER

## 2023-08-07 PROCEDURE — 99999 PR PBB SHADOW E&M-EST. PATIENT-LVL V: ICD-10-PCS | Mod: PBBFAC,,, | Performed by: NURSE PRACTITIONER

## 2023-08-07 PROCEDURE — 1159F MED LIST DOCD IN RCRD: CPT | Mod: CPTII,S$GLB,, | Performed by: NURSE PRACTITIONER

## 2023-08-07 PROCEDURE — 3079F PR MOST RECENT DIASTOLIC BLOOD PRESSURE 80-89 MM HG: ICD-10-PCS | Mod: CPTII,S$GLB,, | Performed by: NURSE PRACTITIONER

## 2023-08-07 PROCEDURE — 3008F BODY MASS INDEX DOCD: CPT | Mod: CPTII,S$GLB,, | Performed by: NURSE PRACTITIONER

## 2023-08-07 PROCEDURE — 3008F PR BODY MASS INDEX (BMI) DOCUMENTED: ICD-10-PCS | Mod: CPTII,S$GLB,, | Performed by: NURSE PRACTITIONER

## 2023-08-07 PROCEDURE — 3075F PR MOST RECENT SYSTOLIC BLOOD PRESS GE 130-139MM HG: ICD-10-PCS | Mod: CPTII,S$GLB,, | Performed by: NURSE PRACTITIONER

## 2023-08-07 PROCEDURE — 99999 PR PBB SHADOW E&M-EST. PATIENT-LVL V: CPT | Mod: PBBFAC,,, | Performed by: NURSE PRACTITIONER

## 2023-08-07 PROCEDURE — 99215 OFFICE O/P EST HI 40 MIN: CPT | Mod: S$GLB,,, | Performed by: NURSE PRACTITIONER

## 2023-08-07 PROCEDURE — 63600175 PHARM REV CODE 636 W HCPCS: Mod: JW,JG | Performed by: NURSE PRACTITIONER

## 2023-08-07 PROCEDURE — 99215 PR OFFICE/OUTPT VISIT, EST, LEVL V, 40-54 MIN: ICD-10-PCS | Mod: S$GLB,,, | Performed by: NURSE PRACTITIONER

## 2023-08-07 PROCEDURE — 1160F RVW MEDS BY RX/DR IN RCRD: CPT | Mod: CPTII,S$GLB,, | Performed by: NURSE PRACTITIONER

## 2023-08-07 PROCEDURE — 3079F DIAST BP 80-89 MM HG: CPT | Mod: CPTII,S$GLB,, | Performed by: NURSE PRACTITIONER

## 2023-08-07 PROCEDURE — 1160F PR REVIEW ALL MEDS BY PRESCRIBER/CLIN PHARMACIST DOCUMENTED: ICD-10-PCS | Mod: CPTII,S$GLB,, | Performed by: NURSE PRACTITIONER

## 2023-08-07 RX ORDER — HEPARIN 100 UNIT/ML
500 SYRINGE INTRAVENOUS
Status: DISCONTINUED | OUTPATIENT
Start: 2023-08-07 | End: 2023-08-07 | Stop reason: HOSPADM

## 2023-08-07 RX ORDER — HEPARIN 100 UNIT/ML
500 SYRINGE INTRAVENOUS
Status: CANCELLED | OUTPATIENT
Start: 2023-08-07

## 2023-08-07 RX ORDER — SODIUM CHLORIDE 0.9 % (FLUSH) 0.9 %
10 SYRINGE (ML) INJECTION
Status: DISCONTINUED | OUTPATIENT
Start: 2023-08-07 | End: 2023-08-07 | Stop reason: HOSPADM

## 2023-08-07 RX ORDER — LENALIDOMIDE 10 MG/1
1 CAPSULE ORAL SEE ADMIN INSTRUCTIONS
Qty: 21 EACH | Refills: 0 | Status: SHIPPED | OUTPATIENT
Start: 2023-08-07 | End: 2023-08-09 | Stop reason: SDUPTHER

## 2023-08-07 RX ORDER — BORTEZOMIB 3.5 MG/1
1.3 INJECTION, POWDER, LYOPHILIZED, FOR SOLUTION INTRAVENOUS; SUBCUTANEOUS
Status: COMPLETED | OUTPATIENT
Start: 2023-08-07 | End: 2023-08-07

## 2023-08-07 RX ORDER — SODIUM CHLORIDE 0.9 % (FLUSH) 0.9 %
10 SYRINGE (ML) INJECTION
Status: CANCELLED | OUTPATIENT
Start: 2023-08-07

## 2023-08-07 RX ORDER — BORTEZOMIB 3.5 MG/1
1.3 INJECTION, POWDER, LYOPHILIZED, FOR SOLUTION INTRAVENOUS; SUBCUTANEOUS
Status: CANCELLED | OUTPATIENT
Start: 2023-08-07

## 2023-08-07 RX ADMIN — BORTEZOMIB 2.5 MG: 3.5 INJECTION, POWDER, LYOPHILIZED, FOR SOLUTION INTRAVENOUS; SUBCUTANEOUS at 09:08

## 2023-08-07 NOTE — PLAN OF CARE
0820 Labs, hx, and medications reviewed, pt meets parameters for treatment today. Assessment completed and plan of care reviewed. Pt verbalized understanding. Pt only getting Velcade injection today, Zometa is Q3 months. Pt voices no new complaints or concerns, will continue to monitor for safety.

## 2023-08-07 NOTE — PLAN OF CARE
0905 Pt tolerated Velcade injection well today, no complaints or complications,. VSS through duration of treatment. Pt aware to call provider with any questions or concerns and is aware of upcoming appts. Pt ambulatory from clinic with steady gait, no distress noted.

## 2023-08-08 LAB
PATHOLOGIST INTERPRETATION IFE: NORMAL
PATHOLOGIST INTERPRETATION SPE: NORMAL

## 2023-08-09 DIAGNOSIS — C90.01 MULTIPLE MYELOMA IN REMISSION: ICD-10-CM

## 2023-08-10 ENCOUNTER — PATIENT MESSAGE (OUTPATIENT)
Dept: HEMATOLOGY/ONCOLOGY | Facility: CLINIC | Age: 63
End: 2023-08-10
Payer: COMMERCIAL

## 2023-08-10 DIAGNOSIS — C90.01 MULTIPLE MYELOMA IN REMISSION: ICD-10-CM

## 2023-08-10 RX ORDER — LENALIDOMIDE 10 MG/1
1 CAPSULE ORAL SEE ADMIN INSTRUCTIONS
Qty: 21 EACH | Refills: 0 | Status: SHIPPED | OUTPATIENT
Start: 2023-08-10 | End: 2023-10-12

## 2023-08-10 NOTE — TELEPHONE ENCOUNTER
"----- Message from Maecarmita Fernando sent at 8/10/2023  3:47 PM CDT -----  Regarding: [facility] Auth  [facility]     [facility] requesting new rems auth for the following meds lenalidomide 10 mg Cap.   Please call and adv @     Confirmed contact below:   Contact Name: [facility]  Phone Number: [phone] ref # [id number]               Additional Notes:  "Thank you for all that you do for our patients"                                           "

## 2023-08-11 ENCOUNTER — PATIENT MESSAGE (OUTPATIENT)
Dept: HEMATOLOGY/ONCOLOGY | Facility: CLINIC | Age: 63
End: 2023-08-11
Payer: COMMERCIAL

## 2023-08-11 ENCOUNTER — PATIENT MESSAGE (OUTPATIENT)
Dept: RESEARCH | Facility: HOSPITAL | Age: 63
End: 2023-08-11
Payer: COMMERCIAL

## 2023-08-11 DIAGNOSIS — C90.01 MULTIPLE MYELOMA IN REMISSION: ICD-10-CM

## 2023-09-04 DIAGNOSIS — C90.01 MULTIPLE MYELOMA IN REMISSION: ICD-10-CM

## 2023-09-05 ENCOUNTER — LAB VISIT (OUTPATIENT)
Dept: LAB | Facility: OTHER | Age: 63
End: 2023-09-05
Attending: INTERNAL MEDICINE
Payer: COMMERCIAL

## 2023-09-05 DIAGNOSIS — Z94.81 AUTOLOGOUS BONE MARROW TRANSPLANTATION STATUS: ICD-10-CM

## 2023-09-05 DIAGNOSIS — C90.01 MULTIPLE MYELOMA IN REMISSION: ICD-10-CM

## 2023-09-05 LAB
ALBUMIN SERPL BCP-MCNC: 3.6 G/DL (ref 3.5–5.2)
ALP SERPL-CCNC: 64 U/L (ref 55–135)
ALT SERPL W/O P-5'-P-CCNC: 26 U/L (ref 10–44)
ANION GAP SERPL CALC-SCNC: 9 MMOL/L (ref 8–16)
AST SERPL-CCNC: 26 U/L (ref 10–40)
BASOPHILS # BLD AUTO: 0.02 K/UL (ref 0–0.2)
BASOPHILS NFR BLD: 0.4 % (ref 0–1.9)
BILIRUB SERPL-MCNC: 1.3 MG/DL (ref 0.1–1)
BUN SERPL-MCNC: 12 MG/DL (ref 8–23)
CALCIUM SERPL-MCNC: 8.7 MG/DL (ref 8.7–10.5)
CHLORIDE SERPL-SCNC: 106 MMOL/L (ref 95–110)
CO2 SERPL-SCNC: 23 MMOL/L (ref 23–29)
CREAT SERPL-MCNC: 1 MG/DL (ref 0.5–1.4)
DIFFERENTIAL METHOD: ABNORMAL
EOSINOPHIL # BLD AUTO: 0.1 K/UL (ref 0–0.5)
EOSINOPHIL NFR BLD: 1.6 % (ref 0–8)
ERYTHROCYTE [DISTWIDTH] IN BLOOD BY AUTOMATED COUNT: 15.2 % (ref 11.5–14.5)
EST. GFR  (NO RACE VARIABLE): >60 ML/MIN/1.73 M^2
GLUCOSE SERPL-MCNC: 83 MG/DL (ref 70–110)
HCT VFR BLD AUTO: 40.7 % (ref 40–54)
HGB BLD-MCNC: 13.6 G/DL (ref 14–18)
IGA SERPL-MCNC: 181 MG/DL (ref 40–350)
IGG SERPL-MCNC: 759 MG/DL (ref 650–1600)
IGM SERPL-MCNC: 14 MG/DL (ref 50–300)
IMM GRANULOCYTES # BLD AUTO: 0.01 K/UL (ref 0–0.04)
IMM GRANULOCYTES NFR BLD AUTO: 0.2 % (ref 0–0.5)
LYMPHOCYTES # BLD AUTO: 1.9 K/UL (ref 1–4.8)
LYMPHOCYTES NFR BLD: 38.6 % (ref 18–48)
MAGNESIUM SERPL-MCNC: 1.9 MG/DL (ref 1.6–2.6)
MCH RBC QN AUTO: 32.5 PG (ref 27–31)
MCHC RBC AUTO-ENTMCNC: 33.4 G/DL (ref 32–36)
MCV RBC AUTO: 97 FL (ref 82–98)
MONOCYTES # BLD AUTO: 0.7 K/UL (ref 0.3–1)
MONOCYTES NFR BLD: 14.5 % (ref 4–15)
NEUTROPHILS # BLD AUTO: 2.2 K/UL (ref 1.8–7.7)
NEUTROPHILS NFR BLD: 44.7 % (ref 38–73)
NRBC BLD-RTO: 0 /100 WBC
PHOSPHATE SERPL-MCNC: 3.4 MG/DL (ref 2.7–4.5)
PLATELET # BLD AUTO: 161 K/UL (ref 150–450)
PMV BLD AUTO: 9.8 FL (ref 9.2–12.9)
POTASSIUM SERPL-SCNC: 3.8 MMOL/L (ref 3.5–5.1)
PROT SERPL-MCNC: 6.6 G/DL (ref 6–8.4)
RBC # BLD AUTO: 4.19 M/UL (ref 4.6–6.2)
SODIUM SERPL-SCNC: 138 MMOL/L (ref 136–145)
WBC # BLD AUTO: 4.97 K/UL (ref 3.9–12.7)

## 2023-09-05 PROCEDURE — 82784 ASSAY IGA/IGD/IGG/IGM EACH: CPT | Mod: 59 | Performed by: NURSE PRACTITIONER

## 2023-09-05 PROCEDURE — 36415 COLL VENOUS BLD VENIPUNCTURE: CPT | Performed by: NURSE PRACTITIONER

## 2023-09-05 PROCEDURE — 84165 PROTEIN E-PHORESIS SERUM: CPT | Mod: 26,,, | Performed by: PATHOLOGY

## 2023-09-05 PROCEDURE — 86334 IMMUNOFIX E-PHORESIS SERUM: CPT | Performed by: NURSE PRACTITIONER

## 2023-09-05 PROCEDURE — 83735 ASSAY OF MAGNESIUM: CPT | Performed by: NURSE PRACTITIONER

## 2023-09-05 PROCEDURE — 86334 PATHOLOGIST INTERPRETATION IFE: ICD-10-PCS | Mod: 26,,, | Performed by: PATHOLOGY

## 2023-09-05 PROCEDURE — 84165 PATHOLOGIST INTERPRETATION SPE: ICD-10-PCS | Mod: 26,,, | Performed by: PATHOLOGY

## 2023-09-05 PROCEDURE — 86334 IMMUNOFIX E-PHORESIS SERUM: CPT | Mod: 26,,, | Performed by: PATHOLOGY

## 2023-09-05 PROCEDURE — 83521 IG LIGHT CHAINS FREE EACH: CPT | Performed by: NURSE PRACTITIONER

## 2023-09-05 PROCEDURE — 84100 ASSAY OF PHOSPHORUS: CPT | Performed by: NURSE PRACTITIONER

## 2023-09-05 PROCEDURE — 84165 PROTEIN E-PHORESIS SERUM: CPT | Performed by: NURSE PRACTITIONER

## 2023-09-05 PROCEDURE — 80053 COMPREHEN METABOLIC PANEL: CPT | Performed by: NURSE PRACTITIONER

## 2023-09-05 PROCEDURE — 85025 COMPLETE CBC W/AUTO DIFF WBC: CPT | Performed by: NURSE PRACTITIONER

## 2023-09-05 RX ORDER — HEPARIN 100 UNIT/ML
500 SYRINGE INTRAVENOUS
Status: CANCELLED | OUTPATIENT
Start: 2023-09-05

## 2023-09-05 RX ORDER — SODIUM CHLORIDE 0.9 % (FLUSH) 0.9 %
10 SYRINGE (ML) INJECTION
Status: CANCELLED | OUTPATIENT
Start: 2023-09-05

## 2023-09-05 RX ORDER — BORTEZOMIB 3.5 MG/1
1.3 INJECTION, POWDER, LYOPHILIZED, FOR SOLUTION INTRAVENOUS; SUBCUTANEOUS
Status: CANCELLED | OUTPATIENT
Start: 2023-09-05

## 2023-09-06 ENCOUNTER — INFUSION (OUTPATIENT)
Dept: INFUSION THERAPY | Facility: OTHER | Age: 63
End: 2023-09-06
Attending: INTERNAL MEDICINE
Payer: COMMERCIAL

## 2023-09-06 VITALS
WEIGHT: 179.25 LBS | DIASTOLIC BLOOD PRESSURE: 68 MMHG | SYSTOLIC BLOOD PRESSURE: 145 MMHG | HEART RATE: 83 BPM | RESPIRATION RATE: 14 BRPM | BODY MASS INDEX: 26.55 KG/M2 | TEMPERATURE: 98 F | HEIGHT: 69 IN | OXYGEN SATURATION: 96 %

## 2023-09-06 DIAGNOSIS — C90.01 MULTIPLE MYELOMA IN REMISSION: Primary | ICD-10-CM

## 2023-09-06 LAB
ALBUMIN SERPL ELPH-MCNC: 3.9 G/DL (ref 3.35–5.55)
ALPHA1 GLOB SERPL ELPH-MCNC: 0.29 G/DL (ref 0.17–0.41)
ALPHA2 GLOB SERPL ELPH-MCNC: 0.6 G/DL (ref 0.43–0.99)
B-GLOBULIN SERPL ELPH-MCNC: 0.77 G/DL (ref 0.5–1.1)
GAMMA GLOB SERPL ELPH-MCNC: 0.73 G/DL (ref 0.67–1.58)
INTERPRETATION SERPL IFE-IMP: NORMAL
KAPPA LC SER QL IA: 2.22 MG/DL (ref 0.33–1.94)
KAPPA LC/LAMBDA SER IA: 2.06 (ref 0.26–1.65)
LAMBDA LC SER QL IA: 1.08 MG/DL (ref 0.57–2.63)
PROT SERPL-MCNC: 6.3 G/DL (ref 6–8.4)

## 2023-09-06 PROCEDURE — 96401 CHEMO ANTI-NEOPL SQ/IM: CPT

## 2023-09-06 PROCEDURE — 63600175 PHARM REV CODE 636 W HCPCS: Mod: JZ,JG | Performed by: INTERNAL MEDICINE

## 2023-09-06 RX ORDER — HEPARIN 100 UNIT/ML
500 SYRINGE INTRAVENOUS
Status: DISCONTINUED | OUTPATIENT
Start: 2023-09-06 | End: 2023-09-06 | Stop reason: HOSPADM

## 2023-09-06 RX ORDER — BORTEZOMIB 3.5 MG/1
1.3 INJECTION, POWDER, LYOPHILIZED, FOR SOLUTION INTRAVENOUS; SUBCUTANEOUS
Status: COMPLETED | OUTPATIENT
Start: 2023-09-06 | End: 2023-09-06

## 2023-09-06 RX ORDER — LENALIDOMIDE 10 MG/1
CAPSULE ORAL
Qty: 21 EACH | Refills: 0 | Status: SHIPPED | OUTPATIENT
Start: 2023-09-06 | End: 2023-10-09

## 2023-09-06 RX ORDER — LENALIDOMIDE 10 MG/1
1 CAPSULE ORAL SEE ADMIN INSTRUCTIONS
Qty: 21 EACH | Refills: 0 | Status: SHIPPED | OUTPATIENT
Start: 2023-09-06 | End: 2023-10-12

## 2023-09-06 RX ORDER — SODIUM CHLORIDE 0.9 % (FLUSH) 0.9 %
10 SYRINGE (ML) INJECTION
Status: DISCONTINUED | OUTPATIENT
Start: 2023-09-06 | End: 2023-09-06 | Stop reason: HOSPADM

## 2023-09-06 RX ADMIN — BORTEZOMIB 2.5 MG: 3.5 INJECTION, POWDER, LYOPHILIZED, FOR SOLUTION INTRAVENOUS; SUBCUTANEOUS at 09:09

## 2023-09-06 NOTE — PLAN OF CARE
Patient tolerated his subcutaneous injection of Velcade. Chemo precautions in placed and reviewed with patient. VSS. NAD. Discussed discharge instructions and setup his appt times. No other questions asked. Patient discharged to home per self.

## 2023-09-07 LAB
PATHOLOGIST INTERPRETATION IFE: NORMAL
PATHOLOGIST INTERPRETATION SPE: NORMAL

## 2023-09-20 ENCOUNTER — PATIENT MESSAGE (OUTPATIENT)
Dept: HEMATOLOGY/ONCOLOGY | Facility: CLINIC | Age: 63
End: 2023-09-20
Payer: COMMERCIAL

## 2023-10-03 ENCOUNTER — LAB VISIT (OUTPATIENT)
Dept: LAB | Facility: OTHER | Age: 63
End: 2023-10-03
Attending: INTERNAL MEDICINE
Payer: COMMERCIAL

## 2023-10-03 DIAGNOSIS — C90.01 MULTIPLE MYELOMA IN REMISSION: ICD-10-CM

## 2023-10-03 LAB
ALBUMIN SERPL BCP-MCNC: 3.7 G/DL (ref 3.5–5.2)
ALP SERPL-CCNC: 58 U/L (ref 55–135)
ALT SERPL W/O P-5'-P-CCNC: 26 U/L (ref 10–44)
ANION GAP SERPL CALC-SCNC: 10 MMOL/L (ref 8–16)
AST SERPL-CCNC: 25 U/L (ref 10–40)
BASOPHILS # BLD AUTO: 0.02 K/UL (ref 0–0.2)
BASOPHILS NFR BLD: 0.6 % (ref 0–1.9)
BILIRUB SERPL-MCNC: 1.5 MG/DL (ref 0.1–1)
BUN SERPL-MCNC: 14 MG/DL (ref 8–23)
CALCIUM SERPL-MCNC: 9.5 MG/DL (ref 8.7–10.5)
CHLORIDE SERPL-SCNC: 106 MMOL/L (ref 95–110)
CO2 SERPL-SCNC: 24 MMOL/L (ref 23–29)
CREAT SERPL-MCNC: 1 MG/DL (ref 0.5–1.4)
DIFFERENTIAL METHOD: ABNORMAL
EOSINOPHIL # BLD AUTO: 0.1 K/UL (ref 0–0.5)
EOSINOPHIL NFR BLD: 2 % (ref 0–8)
ERYTHROCYTE [DISTWIDTH] IN BLOOD BY AUTOMATED COUNT: 15.1 % (ref 11.5–14.5)
EST. GFR  (NO RACE VARIABLE): >60 ML/MIN/1.73 M^2
GLUCOSE SERPL-MCNC: 103 MG/DL (ref 70–110)
HCT VFR BLD AUTO: 39.5 % (ref 40–54)
HGB BLD-MCNC: 13.1 G/DL (ref 14–18)
IGA SERPL-MCNC: 192 MG/DL (ref 40–350)
IGG SERPL-MCNC: 686 MG/DL (ref 650–1600)
IGM SERPL-MCNC: 13 MG/DL (ref 50–300)
IMM GRANULOCYTES # BLD AUTO: 0.01 K/UL (ref 0–0.04)
IMM GRANULOCYTES NFR BLD AUTO: 0.3 % (ref 0–0.5)
LYMPHOCYTES # BLD AUTO: 1.4 K/UL (ref 1–4.8)
LYMPHOCYTES NFR BLD: 41.1 % (ref 18–48)
MCH RBC QN AUTO: 32.2 PG (ref 27–31)
MCHC RBC AUTO-ENTMCNC: 33.2 G/DL (ref 32–36)
MCV RBC AUTO: 97 FL (ref 82–98)
MONOCYTES # BLD AUTO: 0.5 K/UL (ref 0.3–1)
MONOCYTES NFR BLD: 14 % (ref 4–15)
NEUTROPHILS # BLD AUTO: 1.5 K/UL (ref 1.8–7.7)
NEUTROPHILS NFR BLD: 42 % (ref 38–73)
NRBC BLD-RTO: 0 /100 WBC
PLATELET # BLD AUTO: 128 K/UL (ref 150–450)
PMV BLD AUTO: 9.5 FL (ref 9.2–12.9)
POTASSIUM SERPL-SCNC: 3.6 MMOL/L (ref 3.5–5.1)
PROT SERPL-MCNC: 6.3 G/DL (ref 6–8.4)
RBC # BLD AUTO: 4.07 M/UL (ref 4.6–6.2)
SODIUM SERPL-SCNC: 140 MMOL/L (ref 136–145)
WBC # BLD AUTO: 3.5 K/UL (ref 3.9–12.7)

## 2023-10-03 PROCEDURE — 36415 COLL VENOUS BLD VENIPUNCTURE: CPT | Performed by: INTERNAL MEDICINE

## 2023-10-03 PROCEDURE — 80053 COMPREHEN METABOLIC PANEL: CPT | Performed by: INTERNAL MEDICINE

## 2023-10-03 PROCEDURE — 84165 PROTEIN E-PHORESIS SERUM: CPT | Mod: 26,,, | Performed by: PATHOLOGY

## 2023-10-03 PROCEDURE — 85025 COMPLETE CBC W/AUTO DIFF WBC: CPT | Performed by: INTERNAL MEDICINE

## 2023-10-03 PROCEDURE — 82784 ASSAY IGA/IGD/IGG/IGM EACH: CPT | Mod: 59 | Performed by: INTERNAL MEDICINE

## 2023-10-03 PROCEDURE — 84165 PATHOLOGIST INTERPRETATION SPE: ICD-10-PCS | Mod: 26,,, | Performed by: PATHOLOGY

## 2023-10-03 PROCEDURE — 83521 IG LIGHT CHAINS FREE EACH: CPT | Performed by: INTERNAL MEDICINE

## 2023-10-03 PROCEDURE — 84165 PROTEIN E-PHORESIS SERUM: CPT | Performed by: INTERNAL MEDICINE

## 2023-10-04 ENCOUNTER — INFUSION (OUTPATIENT)
Dept: INFUSION THERAPY | Facility: HOSPITAL | Age: 63
End: 2023-10-04
Attending: INTERNAL MEDICINE
Payer: COMMERCIAL

## 2023-10-04 ENCOUNTER — OFFICE VISIT (OUTPATIENT)
Dept: HEMATOLOGY/ONCOLOGY | Facility: CLINIC | Age: 63
End: 2023-10-04
Payer: COMMERCIAL

## 2023-10-04 VITALS
RESPIRATION RATE: 16 BRPM | WEIGHT: 180.13 LBS | OXYGEN SATURATION: 96 % | HEIGHT: 68 IN | SYSTOLIC BLOOD PRESSURE: 143 MMHG | SYSTOLIC BLOOD PRESSURE: 149 MMHG | HEART RATE: 66 BPM | OXYGEN SATURATION: 95 % | HEART RATE: 70 BPM | DIASTOLIC BLOOD PRESSURE: 86 MMHG | TEMPERATURE: 98 F | BODY MASS INDEX: 27.3 KG/M2 | RESPIRATION RATE: 16 BRPM | DIASTOLIC BLOOD PRESSURE: 72 MMHG | TEMPERATURE: 98 F

## 2023-10-04 DIAGNOSIS — C90.01 MULTIPLE MYELOMA IN REMISSION: Primary | ICD-10-CM

## 2023-10-04 DIAGNOSIS — K21.9 GERD WITHOUT ESOPHAGITIS: ICD-10-CM

## 2023-10-04 DIAGNOSIS — C90.01 MULTIPLE MYELOMA IN REMISSION: ICD-10-CM

## 2023-10-04 DIAGNOSIS — D61.818 PANCYTOPENIA: ICD-10-CM

## 2023-10-04 DIAGNOSIS — R03.0 ELEVATED BLOOD PRESSURE READING: Primary | ICD-10-CM

## 2023-10-04 DIAGNOSIS — C90.00 IGA MYELOMA: ICD-10-CM

## 2023-10-04 DIAGNOSIS — Z86.718 HISTORY OF DVT (DEEP VEIN THROMBOSIS): ICD-10-CM

## 2023-10-04 LAB
ALBUMIN SERPL ELPH-MCNC: 3.68 G/DL (ref 3.35–5.55)
ALPHA1 GLOB SERPL ELPH-MCNC: 0.28 G/DL (ref 0.17–0.41)
ALPHA2 GLOB SERPL ELPH-MCNC: 0.61 G/DL (ref 0.43–0.99)
B-GLOBULIN SERPL ELPH-MCNC: 0.77 G/DL (ref 0.5–1.1)
GAMMA GLOB SERPL ELPH-MCNC: 0.67 G/DL (ref 0.67–1.58)
KAPPA LC SER QL IA: 1.89 MG/DL (ref 0.33–1.94)
KAPPA LC/LAMBDA SER IA: 2.03 (ref 0.26–1.65)
LAMBDA LC SER QL IA: 0.93 MG/DL (ref 0.57–2.63)
PROT SERPL-MCNC: 6 G/DL (ref 6–8.4)

## 2023-10-04 PROCEDURE — 1159F PR MEDICATION LIST DOCUMENTED IN MEDICAL RECORD: ICD-10-PCS | Mod: CPTII,S$GLB,, | Performed by: INTERNAL MEDICINE

## 2023-10-04 PROCEDURE — 96374 THER/PROPH/DIAG INJ IV PUSH: CPT

## 2023-10-04 PROCEDURE — 3008F PR BODY MASS INDEX (BMI) DOCUMENTED: ICD-10-PCS | Mod: CPTII,S$GLB,, | Performed by: INTERNAL MEDICINE

## 2023-10-04 PROCEDURE — 3008F BODY MASS INDEX DOCD: CPT | Mod: CPTII,S$GLB,, | Performed by: INTERNAL MEDICINE

## 2023-10-04 PROCEDURE — 99214 PR OFFICE/OUTPT VISIT, EST, LEVL IV, 30-39 MIN: ICD-10-PCS | Mod: S$GLB,,, | Performed by: INTERNAL MEDICINE

## 2023-10-04 PROCEDURE — 3077F SYST BP >= 140 MM HG: CPT | Mod: CPTII,S$GLB,, | Performed by: INTERNAL MEDICINE

## 2023-10-04 PROCEDURE — 25000003 PHARM REV CODE 250: Performed by: INTERNAL MEDICINE

## 2023-10-04 PROCEDURE — 3079F DIAST BP 80-89 MM HG: CPT | Mod: CPTII,S$GLB,, | Performed by: INTERNAL MEDICINE

## 2023-10-04 PROCEDURE — 3077F PR MOST RECENT SYSTOLIC BLOOD PRESSURE >= 140 MM HG: ICD-10-PCS | Mod: CPTII,S$GLB,, | Performed by: INTERNAL MEDICINE

## 2023-10-04 PROCEDURE — 63600175 PHARM REV CODE 636 W HCPCS: Performed by: INTERNAL MEDICINE

## 2023-10-04 PROCEDURE — 3079F PR MOST RECENT DIASTOLIC BLOOD PRESSURE 80-89 MM HG: ICD-10-PCS | Mod: CPTII,S$GLB,, | Performed by: INTERNAL MEDICINE

## 2023-10-04 PROCEDURE — 99214 OFFICE O/P EST MOD 30 MIN: CPT | Mod: S$GLB,,, | Performed by: INTERNAL MEDICINE

## 2023-10-04 PROCEDURE — 99999 PR PBB SHADOW E&M-EST. PATIENT-LVL IV: ICD-10-PCS | Mod: PBBFAC,,, | Performed by: INTERNAL MEDICINE

## 2023-10-04 PROCEDURE — 96401 CHEMO ANTI-NEOPL SQ/IM: CPT

## 2023-10-04 PROCEDURE — 96375 TX/PRO/DX INJ NEW DRUG ADDON: CPT

## 2023-10-04 PROCEDURE — 1159F MED LIST DOCD IN RCRD: CPT | Mod: CPTII,S$GLB,, | Performed by: INTERNAL MEDICINE

## 2023-10-04 PROCEDURE — 99999 PR PBB SHADOW E&M-EST. PATIENT-LVL IV: CPT | Mod: PBBFAC,,, | Performed by: INTERNAL MEDICINE

## 2023-10-04 RX ORDER — SODIUM CHLORIDE 0.9 % (FLUSH) 0.9 %
10 SYRINGE (ML) INJECTION
Status: DISCONTINUED | OUTPATIENT
Start: 2023-10-04 | End: 2023-10-04 | Stop reason: HOSPADM

## 2023-10-04 RX ORDER — HEPARIN 100 UNIT/ML
500 SYRINGE INTRAVENOUS
Status: CANCELLED | OUTPATIENT
Start: 2023-10-05

## 2023-10-04 RX ORDER — BORTEZOMIB 3.5 MG/1
1.3 INJECTION, POWDER, LYOPHILIZED, FOR SOLUTION INTRAVENOUS; SUBCUTANEOUS
Status: COMPLETED | OUTPATIENT
Start: 2023-10-04 | End: 2023-10-04

## 2023-10-04 RX ORDER — SODIUM CHLORIDE 0.9 % (FLUSH) 0.9 %
10 SYRINGE (ML) INJECTION
Status: CANCELLED | OUTPATIENT
Start: 2023-10-04

## 2023-10-04 RX ORDER — HEPARIN 100 UNIT/ML
500 SYRINGE INTRAVENOUS
Status: DISCONTINUED | OUTPATIENT
Start: 2023-10-04 | End: 2023-10-04 | Stop reason: HOSPADM

## 2023-10-04 RX ORDER — LENALIDOMIDE 10 MG/1
1 CAPSULE ORAL SEE ADMIN INSTRUCTIONS
Qty: 21 EACH | Refills: 0 | Status: SHIPPED | OUTPATIENT
Start: 2023-10-04 | End: 2023-10-12 | Stop reason: SDUPTHER

## 2023-10-04 RX ORDER — ZOLEDRONIC ACID 0.04 MG/ML
4 INJECTION, SOLUTION INTRAVENOUS
Status: CANCELLED | OUTPATIENT
Start: 2023-10-05

## 2023-10-04 RX ORDER — HEPARIN 100 UNIT/ML
500 SYRINGE INTRAVENOUS
Status: CANCELLED | OUTPATIENT
Start: 2023-10-04

## 2023-10-04 RX ORDER — BORTEZOMIB 3.5 MG/1
1.3 INJECTION, POWDER, LYOPHILIZED, FOR SOLUTION INTRAVENOUS; SUBCUTANEOUS
Status: CANCELLED | OUTPATIENT
Start: 2023-10-04

## 2023-10-04 RX ORDER — SODIUM CHLORIDE 0.9 % (FLUSH) 0.9 %
10 SYRINGE (ML) INJECTION
Status: CANCELLED | OUTPATIENT
Start: 2023-10-05

## 2023-10-04 RX ORDER — ZOLEDRONIC ACID 0.04 MG/ML
4 INJECTION, SOLUTION INTRAVENOUS
Status: COMPLETED | OUTPATIENT
Start: 2023-10-04 | End: 2023-10-04

## 2023-10-04 RX ADMIN — ZOLEDRONIC ACID 4 MG: 0.04 INJECTION, SOLUTION INTRAVENOUS at 11:10

## 2023-10-04 RX ADMIN — SODIUM CHLORIDE: 9 INJECTION, SOLUTION INTRAVENOUS at 11:10

## 2023-10-04 RX ADMIN — BORTEZOMIB 2.5 MG: 3.5 INJECTION, POWDER, LYOPHILIZED, FOR SOLUTION INTRAVENOUS; SUBCUTANEOUS at 12:10

## 2023-10-04 NOTE — PROGRESS NOTES
Chief Complaint : Multiple myeloma, s/p tandem auto SCT, on VRd maintenance, hematology follow up      History of Present Illness : Vicente Connors is a 63 y.o. male here for hematology follow up. He has recently moved to [place] from [place]. He has history of stage IIIA, [employer] stage II IgA kappa multiple myeloma, which is likely intermediate-risk based upon the presence of t(4;14) with hyperdiploidy, based on records available through care everywhere.    IgA Kappa multiple Myeloma was diagnosed in 2014.   He was started on velcade, revlimid and dexamethasone at diagnosis.   Of note, he developed a popliteal DVT and was started on Lovenox and later switched Xarelto.   He completed a stem cell transplant with Dr Schuster in May 2015. He is s/p  tandem autologous transplant in 2015 and has been on triple maintenance with bortezomib, lenalidomide and dexamethasone since then.   He also has peripheral neuropathy, h/o LE DVT on chronic anti-coagulation, GERD, vertebral fracture. He has history of DVT and remains on chronic anticoagulation.  He has had multiple skeletal complications since his original diagnosis.  He had prolonged bout of diarrhea in January 2023. All stool studies were negative/ unremarkable.  He had COVID 19 infection in March 2023. He received paxlovid  MRI spine in March 2023 showed compression fracture of L1 with retropulsion of the posterior fragment as well as minimal compression fractures of the superior endplates of T8 and T11. MRI pelvis with facet arthropathy in the lower lumbar spine.  Hip joints exhibiting bilateral chondral thinning with labral fraying and osteophyte production.     Interval History: He is here for a follow up visit.     Review of patient's allergies indicates:  No Known Allergies        Current Outpatient Medications   Medication Sig    acyclovir (ZOVIRAX) 400 MG tablet Take 1 tablet (400 mg total) by mouth 2 (two) times daily.    bortezomib (VELCADE  INJ) Inject as directed. Pt gets every month    calcium carb/vit D3/minerals (CALCIUM-VITAMIN D ORAL)     dexAMETHasone (DECADRON) 4 MG Tab TAKE 10 TABLETS (40 MG DOSE) BY MOUTH DAY OF AND DAY AFTER VELCADE once monthly    folic acid-vit B6-vit B12 2.5-25-2 mg (FOLBIC) 2.5-25-2 mg Tab Take 1 tablet by mouth once daily.    hydroCHLOROthiazide (HYDRODIURIL) 12.5 MG Tab Take 1 tablet (12.5 mg total) by mouth once daily.    Lactobacillus acidophilus (PROBIOTIC ORAL) Take by mouth. 24 billion CFU    lenalidomide (REVLIMID) 10 mg Cap Take 1 capsule by mouth As instructed (take 1 capsule on days 1-21 of a 28 day cycle).    lenalidomide 10 mg Cap Take 1 capsule by mouth As instructed.    lenalidomide 10 mg Cap Take 1 capsule by mouth As instructed (Take 1 capsule by mouth As instructed Take 1 capsule on days 1-21 of a 28 day cycle.) Take 1 capsule on days 1-21 of a 28 day cycle. [facility] [phone]  8/10/23.    lenalidomide 10 mg Cap Take 1 capsule by mouth As instructed.    magnesium oxide 500 mg Tab once daily.    multivitamin with minerals (MULTI-VITAMIN W/MINERALS ORAL)     omeprazole (PRILOSEC) 20 MG capsule     REVLIMID 10 mg Cap TAKE 1 CAPSULE ON DAYS 1 THROUGH 21 OF A 28 DAY CYCLE AS INSTRUCTED    rivaroxaban (XARELTO) 15 mg Tab Take 1 tablet (15 mg total) by mouth once daily.    sulfamethoxazole-trimethoprim 800-160mg (BACTRIM DS) 800-160 mg Tab TAKE 1 TABLET EVERY MONDAY, WEDNESDAY AND FRIDAY    zoledronic acid (ZOMETA IV) Inject into the vein. Pt gets every 3 months     No current facility-administered medications for this visit.      Review of Systems   Constitutional:  Positive for malaise/fatigue. Negative for chills, fever and weight loss.   HENT:  Negative for congestion, ear pain and sinus pain.    Eyes:  Negative for double vision, photophobia and pain.   Respiratory:  Negative for sputum production and stridor.    Cardiovascular:  Negative for chest pain, palpitations, orthopnea and claudication.    Gastrointestinal:  Negative for blood in stool, constipation, diarrhea, melena and nausea.   Genitourinary:  Negative for dysuria, frequency and urgency.   Musculoskeletal:  Negative for myalgias and neck pain.   Neurological:  Positive for tingling. Negative for dizziness, tremors and speech change.   Endo/Heme/Allergies:  Negative for environmental allergies. Does not bruise/bleed easily.   Psychiatric/Behavioral:  Negative for substance abuse and suicidal ideas.         Vitals:    10/04/23 1001   BP: (!) 143/86   Pulse: 70   Resp: 16   Temp: 98.1 °F (36.7 °C)       PS: ECOG 1    Physical Exam  HENT:      Head: Normocephalic and atraumatic.      Mouth/Throat:      Pharynx: No posterior oropharyngeal erythema.   Eyes:      General: No scleral icterus.  Cardiovascular:      Rate and Rhythm: Normal rate and regular rhythm.   Pulmonary:      Effort: Pulmonary effort is normal. No respiratory distress.      Breath sounds: Normal breath sounds.   Abdominal:      General: There is no distension.      Palpations: There is no mass.      Tenderness: There is no abdominal tenderness.   Musculoskeletal:         General: No swelling.   Lymphadenopathy:      Cervical: No cervical adenopathy.   Neurological:      General: No focal deficit present.      Mental Status: He is alert and oriented to person, place, and time.      Cranial Nerves: No cranial nerve deficit.              1/9/2021 MRI Pelvis    IMPRESSION:   DIFFUSE PELVIC, PROXIMAL FEMORAL AND LOWER LUMBAR HETEROGENEOUS MARROW SIGNAL ABNORMALITY COULD RELATE TO HEMATOPOIETIC RED MARROW RECONVERSION. INFILTRATIVE MYELOMA CANNOT BE EXCLUDED. THERE IS A MORE DISCRETE 1.2 CM ENHANCING LESION IN THE LEFT FEMORAL HEAD THAT COULD ALSO REPRESENT AN ISLAND OF HEMATOPOIETIC RED MARROW. HOWEVER, CONTINUED ATTENTION ON FOLLOW-UP IS RECOMMENDED.    1/9/2021 MRI Lumbar spine/THoracic/Cervical  IMPRESSION:     1. There is interval increase in T2/STIR hyperintensity with enhancement  extending from the left pedicle into the articular pillar and left transverse process of the T1 vertebra, although there is no definite decrease in the intrinsic T1 hypointense signal in this location. This may also be a degenerative process, although focal myeloma is not definitely excluded. Clinical correlation is recommended, and follow-up is suggested on future examinations.      2. There has been interval increase in STIR hyperintensity and enhancement along the posterior endplates at the T1-T2 level, most likely progressive degenerative endplate marrow signal changes.     3. No other focal suspicious STIR hyperintense enhancing signal abnormalities are identified.     4. Stable multilevel compression fracture deformities      11/25/2020 Bone survey    IMPRESSION:   1. No lytic or blastic lesions are noted.      2. Stable compression fractures, as above.     3. Chronic changes, as above.       10/19/22 SPEP: Normal total protein, normal pattern.   10/19/22 sIFE: No monoclonal peaks identified.       3/2/23 SPEP: Normal total protein, normal pattern  3/2/23 sIFE: No monoclonal peaks identified.         3/9/23 MRI spine    FINDINGS:  Cervical spine:     Alignment: Normal.     Vertebrae: No fracture or marrow replacement process.     Discs: Multilevel mild disc height loss and desiccation.     Cord: Normal cord signal.     Craniocervical junction: Unremarkable.     C1-C2: Unremarkable.     Degenerative findings:     C2-C3: Small posterior disc osteophyte complex and bilateral facet arthropathy resulting in mild bilateral neural foraminal narrowing.  No significant spinal canal stenosis.     C3-C4: Posterior disc osteophyte complex and bilateral facet arthropathy resulting in moderate left and mild right neural foraminal narrowing.  Mild effacement of the anterior thecal sac without significant spinal canal stenosis.     C4-C5: Posterior disc osteophyte complex and bilateral facet arthropathy resulting in moderate  predominantly left neural foraminal narrowing.  Effacement of the anterior thecal sac consistent with mild spinal canal stenosis.     C5-C6: Posterior disc osteophyte complex and bilateral uncovertebral spurring resulting in moderate bilateral neural foraminal narrowing.  Effacement of the anterior thecal sac consistent with mild spinal canal stenosis.     C6-C7: Posterior disc osteophyte complex and bilateral uncovertebral spurring findings result in moderate bilateral neural foraminal narrowing.  Effacement of the anterior thecal sac consistent with mild spinal canal stenosis.     C7-T1: No spinal canal stenosis or neural foraminal narrowing.     Paraspinal muscles & soft tissues: Normal.     Enhancement: No abnormal areas of enhancement.     Thoracic Spine:     Alignment: Normal.     Vertebrae: No infiltrative marrow replacing process.  T2 hyperintense lesions consistent with probable hemangiomas of the anterior vertebral body of T4 and posterior and mid vertebral body of T7.  Mild height loss of the superior endplate of T8 and T11.     Discs: Multilevel mild disc height loss and desiccation.     Cord: Normal contour and signal.     Degenerative findings: No significant spinal canal stenosis or neural foraminal narrowing.     Soft tissue: Unremarkable.     Enhancement: No abnormal areas of enhancement.     Lumbar spine:     Alignment: Normal.     Vertebrae: No infiltrative marrow replacing process.  Compression fracture of L1 with retropulsion of posterior fragment approximately 0.6 cm which abuts the conus medullaris without causing deformity or signal abnormality.     Discs: Normal height and signal.     Cord: Multilevel disc height loss and desiccation most pronounced at T12-L1, L1-L2, and L4-L5.     Degenerative findings:     T12-L1: Central disc protrusion.  No spinal canal stenosis or neural foraminal narrowing.     L1-L2: Circumferential disc bulge resulting in near complete effacement of the bilateral  perineural fat, right greater than left, consistent with moderate bilateral neural foraminal narrowing.  No significant spinal canal stenosis.     L2-L3: Circumferential disc bulge, bilateral facet arthropathy, and ligamentum flavum buckling findings result in partial effacement of the right perineural fat consistent with moderate right neural foraminal narrowing.  There is lateral effacement of the thecal sac and crowding of the cauda equina nerves with minimal residual CSF, consistent with moderate spinal canal stenosis.     L3-L4: Circumferential disc bulge, bilateral facet arthropathy, and ligamentum flavum buckling.  No spinal canal stenosis or neural foraminal narrowing.     L4-L5: Circumferential disc bulge, bilateral facet arthropathy, and ligamentum flavum buckling, findings result in minimal effacement of the perineural fat, consistent with mild bilateral neural foraminal narrowing.  Mild effacement of the right lateral thecal sac.     L5-S1: Bilateral facet arthropathy.  No spinal canal stenosis or neural foraminal narrowing.     Upper SI joints/sacrum: Unremarkable.     Soft tissue: Unremarkable.     Enhancement: No abnormal areas of enhancement.     Impression:     1. No findings concerning for multiple myeloma lesion.  2. Compression fracture of L1 with retropulsion of the posterior fragment.  3. Additional minimal compression fractures of the superior endplates of T8 and T11.  4. Degenerative changes of the cervical, thoracic, and lumbar spine, as detailed above.    3/20/23 MRI pelvis      FINDINGS:  No masses or fluid collections. No pelvic ascites or lymphadenopathy.     Visualized bowel loops are unremarkable.  Urinary bladder is decompressed, noting circumferential wall thickening.  Prostate demonstrates heterogeneous signal, likely BPH.     Regional muscles and tendons are unremarkable.     Bone marrow signal is maintained. No fracture, focal lesion or marrow infiltrative process.     Note made  of facet arthropathy in the lower lumbar spine.  Hip joints exhibit bilateral chondral thinning with labral fraying and osteophyte production.     Postoperative changes suggesting prior bilateral herniorrhaphy.     Impression:     1. No focal marrow lesion or diffuse infiltrative process.  2. Additional findings detailed above.    3/31/23 SPEP: Normal total protein, normal pattern.   3/31/23 sIFE: No monoclonal peaks identified.     Component      Latest Ref Rng 10/3/2023   WBC      3.90 - 12.70 K/uL 3.50 (L)    RBC      4.60 - 6.20 M/uL 4.07 (L)    Hemoglobin      14.0 - 18.0 g/dL 13.1 (L)    Hematocrit      40.0 - 54.0 % 39.5 (L)    MCV      82 - 98 fL 97    MCH      27.0 - 31.0 pg 32.2 (H)    MCHC      32.0 - 36.0 g/dL 33.2    RDW      11.5 - 14.5 % 15.1 (H)    Platelet Count      150 - 450 K/uL 128 (L)    MPV      9.2 - 12.9 fL 9.5    Immature Granulocytes      0.0 - 0.5 % 0.3    Gran # (ANC)      1.8 - 7.7 K/uL 1.5 (L)    Immature Grans (Abs)      0.00 - 0.04 K/uL 0.01    Lymph #      1.0 - 4.8 K/uL 1.4    Mono #      0.3 - 1.0 K/uL 0.5    Eos #      0.0 - 0.5 K/uL 0.1    Baso #      0.00 - 0.20 K/uL 0.02    nRBC      0 /100 WBC 0    Gran %      38.0 - 73.0 % 42.0    Lymph %      18.0 - 48.0 % 41.1    Mono %      4.0 - 15.0 % 14.0    Eosinophil %      0.0 - 8.0 % 2.0    Basophil %      0.0 - 1.9 % 0.6    Differential Method Automated    Sodium      136 - 145 mmol/L 140    Potassium      3.5 - 5.1 mmol/L 3.6    Chloride      95 - 110 mmol/L 106    CO2      23 - 29 mmol/L 24    Glucose      70 - 110 mg/dL 103    BUN      8 - 23 mg/dL 14    Creatinine      0.5 - 1.4 mg/dL 1.0    Calcium      8.7 - 10.5 mg/dL 9.5    PROTEIN TOTAL      6.0 - 8.4 g/dL 6.3    Albumin      3.5 - 5.2 g/dL 3.7    BILIRUBIN TOTAL      0.1 - 1.0 mg/dL 1.5 (H)    ALP      55 - 135 U/L 58    AST      10 - 40 U/L 25    ALT      10 - 44 U/L 26    eGFR      >60 mL/min/1.73 m^2 >60    Anion Gap      8 - 16 mmol/L 10    IgG      650 - 1600 mg/dL  686    IgA      40 - 350 mg/dL 192    IgM      50 - 300 mg/dL 13 (L)    Kappa Free Light Chains      0.33 - 1.94 mg/dL 1.89    Lambda Free Light Chains      0.57 - 2.63 mg/dL 0.93    Kappa/Lambda FLC Ratio      0.26 - 1.65  2.03 (H)        9/5/23 SPEP:  Normal total protein, normal pattern.   9/5/23 sIFE : No monoclonal peaks identified.     Assessment:    1. IgA kappa multiple myeloma in remission     -Vicente Connors is a 63 y.o.-year-old male with history of stage IIIA, ISS II IgA kappa multiple myeloma, which is likely intermediate-risk based upon the presence of t(4;14) with hyperdiploidy   -S/ p  tandem auto stem cell transplant in 2015 with a good biochemical remission  -Now on triple maintenance: velcade 1.3 mg/m2 sub q monthly, dex 40 mg PO day of and day after monthly velcade , revlimid 10 mg  PO for 21 days on / 7 days off every 28 days  --Repeat 24 hour urine completed 4/2022  -No monoclonal protein on SPEP/sIFE done on 9/5/23 ; sFLC ratio 2.03, minimally elevated  --He has been getting MRI spine every 2 years  -No lytic or enhancing lesions identified on MRI whole spine done on 3/9/23  -MRI pelvis on 3/20/23 also negative for enhancing/ lytic lesion      2. Normocytic anemia    --grade 1, Hgb 13.1g/dl today  -likely secondary to chemotherapy    3. Leukopenia    --Likely secondary to chemotherapy      4. Absolute neutropenia    --Likely secondary to chemotherapy  -Afebrile; ANC 1.5 today    5. Thrombocytopenia    -Mild,asymptomatic  -Platelet count 128k today        6. Low Back Pain: chronic and unchanged; not on any analgesic at this time    --Spine MRI done 1/21/21, result detailed above under imaging    7. History of LE DVT:     --Continue Xarelto   --Take with largest meal of the day  --Notify office of any scheduled procedure/surgery as Xarelto will need to be interrupted   -no bleeding diathesis reported    8. Infectious disease      --Continue prophylactic bactrim and acyclovir   --recommend  annual flu vaccine, COVID 19 booster      9. Bone health:     --MRI spine1/21 neg   --Repeat MRI spine and pelvis in March 2023 did not demonstrate any new lesions  --Last Zometa given 7/5/23, q3 months, will repeat    10. Diarrhea    --now improved  -stool studies neagtive in Jan 2023    11. Health Maintenance:     --Colonoscopy 2011 and 2017, repeat 2022   --PSA  up to date   --covid vaccine 3/31 pfizer booster done  --Got second booster 2/17/22    -recommend COVID booster           BMT Chart Routing      Follow up with physician 3 months.   Follow up with [facility]    Provider visit type    Infusion scheduling note   zometa today and in 3 months   Injection scheduling note velcade today here; velcad elizabeth 4 weekly at [facility]; velcade at [place] in 3 months   Labs CBC, CMP, SPEP, free light chains, immunofixation and phosphorus   Scheduling:  Preferred lab:  Lab interval:  cbc, cmp every 4 weeks at [facility] [place] ; cbc, cmp, spep, light chains, immunofixation, igg, igm, iga, phos in 3 months   Imaging    Pharmacy appointment    Other referrals

## 2023-10-04 NOTE — PLAN OF CARE
1215 Patient tolerated zometa and velcade with no s/s of reaction and no complaints. PIV removed and catheter tip intact. He declined the AVS and ambulated out.

## 2023-10-04 NOTE — PLAN OF CARE
1050 Patient here for velcade and zometa. Labs, meds and hx reviewed. PIV inserted and NS started at 25ml/hr. Snack and blanket provided.

## 2023-10-05 LAB — PATHOLOGIST INTERPRETATION SPE: NORMAL

## 2023-10-10 ENCOUNTER — PATIENT MESSAGE (OUTPATIENT)
Dept: HEMATOLOGY/ONCOLOGY | Facility: CLINIC | Age: 63
End: 2023-10-10
Payer: COMMERCIAL

## 2023-10-12 ENCOUNTER — TELEPHONE (OUTPATIENT)
Dept: LAB | Facility: HOSPITAL | Age: 63
End: 2023-10-12
Payer: COMMERCIAL

## 2023-10-12 ENCOUNTER — PATIENT MESSAGE (OUTPATIENT)
Dept: HEMATOLOGY/ONCOLOGY | Facility: CLINIC | Age: 63
End: 2023-10-12
Payer: COMMERCIAL

## 2023-10-12 DIAGNOSIS — C90.01 MULTIPLE MYELOMA IN REMISSION: ICD-10-CM

## 2023-10-12 RX ORDER — LENALIDOMIDE 10 MG/1
1 CAPSULE ORAL SEE ADMIN INSTRUCTIONS
Qty: 21 EACH | Refills: 0 | Status: SHIPPED | OUTPATIENT
Start: 2023-10-12 | End: 2023-12-13

## 2023-10-12 RX ORDER — LENALIDOMIDE 10 MG/1
CAPSULE ORAL
Qty: 21 EACH | Refills: 0 | Status: SHIPPED | OUTPATIENT
Start: 2023-10-12 | End: 2023-11-07 | Stop reason: SDUPTHER

## 2023-10-12 NOTE — TELEPHONE ENCOUNTER
"----- Message from Mae Fernando sent at 10/12/2023 12:37 PM CDT -----  Regarding: PA  Contact:[facility]    [facility] requesting sharonda gene auth for the following meds .lenalidomide (REVLIMID) 10 mg Cap. Please call and adv @      Confirmed contact below:   Contact Name: [facility]  Phone Number: [phone] ref [id number]               Additional Notes:  "Thank you for all that you do for our patients"                                           "

## 2023-11-02 ENCOUNTER — LAB VISIT (OUTPATIENT)
Dept: LAB | Facility: OTHER | Age: 63
End: 2023-11-02
Attending: INTERNAL MEDICINE
Payer: COMMERCIAL

## 2023-11-02 DIAGNOSIS — D61.818 PANCYTOPENIA: ICD-10-CM

## 2023-11-02 DIAGNOSIS — C90.01 MULTIPLE MYELOMA IN REMISSION: ICD-10-CM

## 2023-11-02 LAB
ALBUMIN SERPL BCP-MCNC: 3.7 G/DL (ref 3.5–5.2)
ALP SERPL-CCNC: 57 U/L (ref 55–135)
ALT SERPL W/O P-5'-P-CCNC: 27 U/L (ref 10–44)
ANION GAP SERPL CALC-SCNC: 12 MMOL/L (ref 8–16)
AST SERPL-CCNC: 26 U/L (ref 10–40)
BASOPHILS # BLD AUTO: 0.03 K/UL (ref 0–0.2)
BASOPHILS NFR BLD: 1 % (ref 0–1.9)
BILIRUB SERPL-MCNC: 1.5 MG/DL (ref 0.1–1)
BUN SERPL-MCNC: 15 MG/DL (ref 8–23)
CALCIUM SERPL-MCNC: 8.8 MG/DL (ref 8.7–10.5)
CHLORIDE SERPL-SCNC: 105 MMOL/L (ref 95–110)
CO2 SERPL-SCNC: 24 MMOL/L (ref 23–29)
CREAT SERPL-MCNC: 1.1 MG/DL (ref 0.5–1.4)
DIFFERENTIAL METHOD: ABNORMAL
EOSINOPHIL # BLD AUTO: 0.1 K/UL (ref 0–0.5)
EOSINOPHIL NFR BLD: 2.2 % (ref 0–8)
ERYTHROCYTE [DISTWIDTH] IN BLOOD BY AUTOMATED COUNT: 14.8 % (ref 11.5–14.5)
EST. GFR  (NO RACE VARIABLE): >60 ML/MIN/1.73 M^2
GLUCOSE SERPL-MCNC: 105 MG/DL (ref 70–110)
HCT VFR BLD AUTO: 40 % (ref 40–54)
HGB BLD-MCNC: 13.2 G/DL (ref 14–18)
IGA SERPL-MCNC: 204 MG/DL (ref 40–350)
IGG SERPL-MCNC: 703 MG/DL (ref 650–1600)
IGM SERPL-MCNC: 12 MG/DL (ref 50–300)
IMM GRANULOCYTES # BLD AUTO: 0.01 K/UL (ref 0–0.04)
IMM GRANULOCYTES NFR BLD AUTO: 0.3 % (ref 0–0.5)
LYMPHOCYTES # BLD AUTO: 1.3 K/UL (ref 1–4.8)
LYMPHOCYTES NFR BLD: 42 % (ref 18–48)
MCH RBC QN AUTO: 32.4 PG (ref 27–31)
MCHC RBC AUTO-ENTMCNC: 33 G/DL (ref 32–36)
MCV RBC AUTO: 98 FL (ref 82–98)
MONOCYTES # BLD AUTO: 0.5 K/UL (ref 0.3–1)
MONOCYTES NFR BLD: 15.6 % (ref 4–15)
NEUTROPHILS # BLD AUTO: 1.2 K/UL (ref 1.8–7.7)
NEUTROPHILS NFR BLD: 38.9 % (ref 38–73)
NRBC BLD-RTO: 0 /100 WBC
PLATELET # BLD AUTO: 140 K/UL (ref 150–450)
PMV BLD AUTO: 9.1 FL (ref 9.2–12.9)
POTASSIUM SERPL-SCNC: 3.8 MMOL/L (ref 3.5–5.1)
PROT SERPL-MCNC: 6.4 G/DL (ref 6–8.4)
RBC # BLD AUTO: 4.08 M/UL (ref 4.6–6.2)
SODIUM SERPL-SCNC: 141 MMOL/L (ref 136–145)
WBC # BLD AUTO: 3.14 K/UL (ref 3.9–12.7)

## 2023-11-02 PROCEDURE — 84165 PROTEIN E-PHORESIS SERUM: CPT | Performed by: INTERNAL MEDICINE

## 2023-11-02 PROCEDURE — 85025 COMPLETE CBC W/AUTO DIFF WBC: CPT | Performed by: INTERNAL MEDICINE

## 2023-11-02 PROCEDURE — 80053 COMPREHEN METABOLIC PANEL: CPT | Performed by: INTERNAL MEDICINE

## 2023-11-02 PROCEDURE — 84165 PROTEIN E-PHORESIS SERUM: CPT | Mod: 26,,, | Performed by: PATHOLOGY

## 2023-11-02 PROCEDURE — 82784 ASSAY IGA/IGD/IGG/IGM EACH: CPT | Mod: 59 | Performed by: INTERNAL MEDICINE

## 2023-11-02 PROCEDURE — 84165 PATHOLOGIST INTERPRETATION SPE: ICD-10-PCS | Mod: 26,,, | Performed by: PATHOLOGY

## 2023-11-02 PROCEDURE — 83521 IG LIGHT CHAINS FREE EACH: CPT | Mod: 59 | Performed by: INTERNAL MEDICINE

## 2023-11-03 ENCOUNTER — INFUSION (OUTPATIENT)
Dept: INFUSION THERAPY | Facility: OTHER | Age: 63
End: 2023-11-03
Attending: INTERNAL MEDICINE
Payer: COMMERCIAL

## 2023-11-03 VITALS
WEIGHT: 179.44 LBS | HEART RATE: 59 BPM | DIASTOLIC BLOOD PRESSURE: 73 MMHG | HEIGHT: 69 IN | RESPIRATION RATE: 14 BRPM | OXYGEN SATURATION: 97 % | BODY MASS INDEX: 26.58 KG/M2 | TEMPERATURE: 98 F | SYSTOLIC BLOOD PRESSURE: 151 MMHG

## 2023-11-03 DIAGNOSIS — C90.01 MULTIPLE MYELOMA IN REMISSION: Primary | ICD-10-CM

## 2023-11-03 LAB
ALBUMIN SERPL ELPH-MCNC: 3.71 G/DL (ref 3.35–5.55)
ALPHA1 GLOB SERPL ELPH-MCNC: 0.26 G/DL (ref 0.17–0.41)
ALPHA2 GLOB SERPL ELPH-MCNC: 0.59 G/DL (ref 0.43–0.99)
B-GLOBULIN SERPL ELPH-MCNC: 0.79 G/DL (ref 0.5–1.1)
GAMMA GLOB SERPL ELPH-MCNC: 0.65 G/DL (ref 0.67–1.58)
KAPPA LC SER QL IA: 2.04 MG/DL (ref 0.33–1.94)
KAPPA LC/LAMBDA SER IA: 1.81 (ref 0.26–1.65)
LAMBDA LC SER QL IA: 1.13 MG/DL (ref 0.57–2.63)
PROT SERPL-MCNC: 6 G/DL (ref 6–8.4)

## 2023-11-03 PROCEDURE — 63600175 PHARM REV CODE 636 W HCPCS: Mod: JW,JG | Performed by: INTERNAL MEDICINE

## 2023-11-03 PROCEDURE — 96401 CHEMO ANTI-NEOPL SQ/IM: CPT

## 2023-11-03 RX ORDER — BORTEZOMIB 3.5 MG/1
1.3 INJECTION, POWDER, LYOPHILIZED, FOR SOLUTION INTRAVENOUS; SUBCUTANEOUS
Status: COMPLETED | OUTPATIENT
Start: 2023-11-03 | End: 2023-11-03

## 2023-11-03 RX ORDER — LENALIDOMIDE 10 MG/1
1 CAPSULE ORAL SEE ADMIN INSTRUCTIONS
Qty: 21 EACH | Refills: 0 | Status: SHIPPED | OUTPATIENT
Start: 2023-11-03 | End: 2023-12-13

## 2023-11-03 RX ORDER — HEPARIN 100 UNIT/ML
500 SYRINGE INTRAVENOUS
Status: DISCONTINUED | OUTPATIENT
Start: 2023-11-03 | End: 2023-11-03 | Stop reason: HOSPADM

## 2023-11-03 RX ORDER — SODIUM CHLORIDE 0.9 % (FLUSH) 0.9 %
10 SYRINGE (ML) INJECTION
Status: DISCONTINUED | OUTPATIENT
Start: 2023-11-03 | End: 2023-11-03 | Stop reason: HOSPADM

## 2023-11-03 RX ADMIN — BORTEZOMIB 2.5 MG: 3.5 INJECTION, POWDER, LYOPHILIZED, FOR SOLUTION INTRAVENOUS; SUBCUTANEOUS at 09:11

## 2023-11-03 NOTE — PLAN OF CARE
Patient tolerated his subcutaneous injection of Velcade to the abd. .REports no discomfort. VSS. NAD. Discussed discharge instructions. Verbalized understanding. No other questions. Patient discharged to home per self.     Clindamycin Pregnancy And Lactation Text: This medication can be used in pregnancy if certain situations. Clindamycin is also present in breast milk.

## 2023-11-05 DIAGNOSIS — C90.01 MULTIPLE MYELOMA IN REMISSION: ICD-10-CM

## 2023-11-05 RX ORDER — LENALIDOMIDE 10 MG/1
1 CAPSULE ORAL SEE ADMIN INSTRUCTIONS
Qty: 21 EACH | Refills: 0 | Status: CANCELLED | OUTPATIENT
Start: 2023-11-05

## 2023-11-07 ENCOUNTER — PATIENT MESSAGE (OUTPATIENT)
Dept: HEMATOLOGY/ONCOLOGY | Facility: CLINIC | Age: 63
End: 2023-11-07
Payer: COMMERCIAL

## 2023-11-07 DIAGNOSIS — C90.01 MULTIPLE MYELOMA IN REMISSION: ICD-10-CM

## 2023-11-07 LAB — PATHOLOGIST INTERPRETATION SPE: NORMAL

## 2023-11-07 RX ORDER — LENALIDOMIDE 10 MG/1
CAPSULE ORAL
Qty: 21 EACH | Refills: 0 | OUTPATIENT
Start: 2023-11-07 | End: 2024-01-08 | Stop reason: SDUPTHER

## 2023-11-09 DIAGNOSIS — C90.01 MULTIPLE MYELOMA IN REMISSION: Primary | ICD-10-CM

## 2023-11-10 RX ORDER — LENALIDOMIDE 10 MG/1
CAPSULE ORAL
Qty: 21 EACH | Refills: 0 | Status: SHIPPED | OUTPATIENT
Start: 2023-11-10 | End: 2023-12-04 | Stop reason: SDUPTHER

## 2023-11-12 ENCOUNTER — PATIENT MESSAGE (OUTPATIENT)
Dept: HEMATOLOGY/ONCOLOGY | Facility: CLINIC | Age: 63
End: 2023-11-12
Payer: COMMERCIAL

## 2023-11-27 DIAGNOSIS — Z86.718 HISTORY OF DVT (DEEP VEIN THROMBOSIS): ICD-10-CM

## 2023-11-27 DIAGNOSIS — C90.01 MULTIPLE MYELOMA IN REMISSION: ICD-10-CM

## 2023-12-04 ENCOUNTER — LAB VISIT (OUTPATIENT)
Dept: LAB | Facility: OTHER | Age: 63
End: 2023-12-04
Attending: INTERNAL MEDICINE
Payer: COMMERCIAL

## 2023-12-04 DIAGNOSIS — C90.01 MULTIPLE MYELOMA IN REMISSION: ICD-10-CM

## 2023-12-04 DIAGNOSIS — Z94.81 AUTOLOGOUS BONE MARROW TRANSPLANTATION STATUS: ICD-10-CM

## 2023-12-04 DIAGNOSIS — D61.818 PANCYTOPENIA: ICD-10-CM

## 2023-12-04 LAB
ALBUMIN SERPL BCP-MCNC: 3.6 G/DL (ref 3.5–5.2)
ALP SERPL-CCNC: 55 U/L (ref 55–135)
ALT SERPL W/O P-5'-P-CCNC: 29 U/L (ref 10–44)
ANION GAP SERPL CALC-SCNC: 8 MMOL/L (ref 8–16)
AST SERPL-CCNC: 27 U/L (ref 10–40)
BASOPHILS # BLD AUTO: 0.02 K/UL (ref 0–0.2)
BASOPHILS NFR BLD: 0.6 % (ref 0–1.9)
BILIRUB SERPL-MCNC: 1.8 MG/DL (ref 0.1–1)
BUN SERPL-MCNC: 10 MG/DL (ref 8–23)
CALCIUM SERPL-MCNC: 9.3 MG/DL (ref 8.7–10.5)
CHLORIDE SERPL-SCNC: 104 MMOL/L (ref 95–110)
CO2 SERPL-SCNC: 27 MMOL/L (ref 23–29)
CREAT SERPL-MCNC: 0.9 MG/DL (ref 0.5–1.4)
DIFFERENTIAL METHOD: ABNORMAL
EOSINOPHIL # BLD AUTO: 0.1 K/UL (ref 0–0.5)
EOSINOPHIL NFR BLD: 2.8 % (ref 0–8)
ERYTHROCYTE [DISTWIDTH] IN BLOOD BY AUTOMATED COUNT: 14.6 % (ref 11.5–14.5)
EST. GFR  (NO RACE VARIABLE): >60 ML/MIN/1.73 M^2
GLUCOSE SERPL-MCNC: 113 MG/DL (ref 70–110)
HCT VFR BLD AUTO: 39.2 % (ref 40–54)
HGB BLD-MCNC: 13.5 G/DL (ref 14–18)
IGA SERPL-MCNC: 181 MG/DL (ref 40–350)
IGG SERPL-MCNC: 650 MG/DL (ref 650–1600)
IGM SERPL-MCNC: 12 MG/DL (ref 50–300)
IMM GRANULOCYTES # BLD AUTO: 0.01 K/UL (ref 0–0.04)
IMM GRANULOCYTES NFR BLD AUTO: 0.3 % (ref 0–0.5)
LYMPHOCYTES # BLD AUTO: 1.4 K/UL (ref 1–4.8)
LYMPHOCYTES NFR BLD: 44.9 % (ref 18–48)
MAGNESIUM SERPL-MCNC: 1.8 MG/DL (ref 1.6–2.6)
MCH RBC QN AUTO: 32.8 PG (ref 27–31)
MCHC RBC AUTO-ENTMCNC: 34.4 G/DL (ref 32–36)
MCV RBC AUTO: 95 FL (ref 82–98)
MONOCYTES # BLD AUTO: 0.5 K/UL (ref 0.3–1)
MONOCYTES NFR BLD: 15.9 % (ref 4–15)
NEUTROPHILS # BLD AUTO: 1.1 K/UL (ref 1.8–7.7)
NEUTROPHILS NFR BLD: 35.5 % (ref 38–73)
NRBC BLD-RTO: 0 /100 WBC
PHOSPHATE SERPL-MCNC: 3.2 MG/DL (ref 2.7–4.5)
PLATELET # BLD AUTO: 137 K/UL (ref 150–450)
PMV BLD AUTO: 9.2 FL (ref 9.2–12.9)
POTASSIUM SERPL-SCNC: 3.7 MMOL/L (ref 3.5–5.1)
PROT SERPL-MCNC: 6.4 G/DL (ref 6–8.4)
RBC # BLD AUTO: 4.11 M/UL (ref 4.6–6.2)
SODIUM SERPL-SCNC: 139 MMOL/L (ref 136–145)
WBC # BLD AUTO: 3.21 K/UL (ref 3.9–12.7)

## 2023-12-04 PROCEDURE — 84100 ASSAY OF PHOSPHORUS: CPT | Performed by: NURSE PRACTITIONER

## 2023-12-04 PROCEDURE — 84165 PROTEIN E-PHORESIS SERUM: CPT | Performed by: INTERNAL MEDICINE

## 2023-12-04 PROCEDURE — 82784 ASSAY IGA/IGD/IGG/IGM EACH: CPT | Mod: 59 | Performed by: INTERNAL MEDICINE

## 2023-12-04 PROCEDURE — 84165 PROTEIN E-PHORESIS SERUM: CPT | Mod: 26,,, | Performed by: PATHOLOGY

## 2023-12-04 PROCEDURE — 83735 ASSAY OF MAGNESIUM: CPT | Performed by: NURSE PRACTITIONER

## 2023-12-04 PROCEDURE — 80053 COMPREHEN METABOLIC PANEL: CPT | Performed by: INTERNAL MEDICINE

## 2023-12-04 PROCEDURE — 83521 IG LIGHT CHAINS FREE EACH: CPT | Mod: 59 | Performed by: INTERNAL MEDICINE

## 2023-12-04 PROCEDURE — 85025 COMPLETE CBC W/AUTO DIFF WBC: CPT | Performed by: NURSE PRACTITIONER

## 2023-12-04 PROCEDURE — 84165 PATHOLOGIST INTERPRETATION SPE: ICD-10-PCS | Mod: 26,,, | Performed by: PATHOLOGY

## 2023-12-04 PROCEDURE — 36415 COLL VENOUS BLD VENIPUNCTURE: CPT | Performed by: NURSE PRACTITIONER

## 2023-12-04 RX ORDER — LENALIDOMIDE 10 MG/1
1 CAPSULE ORAL SEE ADMIN INSTRUCTIONS
Qty: 21 EACH | Refills: 0 | Status: SHIPPED | OUTPATIENT
Start: 2023-12-04 | End: 2023-12-06 | Stop reason: SDUPTHER

## 2023-12-05 ENCOUNTER — TELEPHONE (OUTPATIENT)
Dept: INFUSION THERAPY | Facility: OTHER | Age: 63
End: 2023-12-05
Payer: COMMERCIAL

## 2023-12-05 LAB
ALBUMIN SERPL ELPH-MCNC: 3.57 G/DL (ref 3.35–5.55)
ALPHA1 GLOB SERPL ELPH-MCNC: 0.27 G/DL (ref 0.17–0.41)
ALPHA2 GLOB SERPL ELPH-MCNC: 0.59 G/DL (ref 0.43–0.99)
B-GLOBULIN SERPL ELPH-MCNC: 0.76 G/DL (ref 0.5–1.1)
GAMMA GLOB SERPL ELPH-MCNC: 0.61 G/DL (ref 0.67–1.58)
KAPPA LC SER QL IA: 1.73 MG/DL (ref 0.33–1.94)
KAPPA LC/LAMBDA SER IA: 1.78 (ref 0.26–1.65)
LAMBDA LC SER QL IA: 0.97 MG/DL (ref 0.57–2.63)
PATHOLOGIST INTERPRETATION SPE: NORMAL
PROT SERPL-MCNC: 5.8 G/DL (ref 6–8.4)

## 2023-12-05 RX ORDER — HEPARIN 100 UNIT/ML
500 SYRINGE INTRAVENOUS
Status: CANCELLED | OUTPATIENT
Start: 2023-12-05

## 2023-12-05 RX ORDER — SODIUM CHLORIDE 0.9 % (FLUSH) 0.9 %
10 SYRINGE (ML) INJECTION
Status: CANCELLED | OUTPATIENT
Start: 2023-12-05

## 2023-12-05 RX ORDER — BORTEZOMIB 3.5 MG/1
1.3 INJECTION, POWDER, LYOPHILIZED, FOR SOLUTION INTRAVENOUS; SUBCUTANEOUS
Status: CANCELLED | OUTPATIENT
Start: 2023-12-05

## 2023-12-05 NOTE — TELEPHONE ENCOUNTER
Returned call and rescheduled his Velcade injection. No other questions asked.      ----- Message from Irvin Martinez sent at 12/5/2023 11:07 AM CST -----  Type:  Sooner Apoointment Request    Caller is requesting a sooner appointment.    Name of Caller:pt  When is the first available appointment?none  Symptoms:Velcade infusion   Would the patient rather a call back or a response via [facility]? call  Best Call Back Number: [phone]  Additional Information: pt missed first infusion appt and needs to get rescheduled fr the next soon as possible

## 2023-12-06 ENCOUNTER — PATIENT MESSAGE (OUTPATIENT)
Dept: HEMATOLOGY/ONCOLOGY | Facility: CLINIC | Age: 63
End: 2023-12-06
Payer: COMMERCIAL

## 2023-12-06 DIAGNOSIS — C90.01 MULTIPLE MYELOMA IN REMISSION: ICD-10-CM

## 2023-12-06 RX ORDER — LENALIDOMIDE 10 MG/1
1 CAPSULE ORAL DAILY
Qty: 21 EACH | Refills: 0 | Status: SHIPPED | OUTPATIENT
Start: 2023-12-06 | End: 2023-12-07 | Stop reason: SDUPTHER

## 2023-12-07 RX ORDER — LENALIDOMIDE 10 MG/1
1 CAPSULE ORAL DAILY
Qty: 21 EACH | Refills: 0 | Status: SHIPPED | OUTPATIENT
Start: 2023-12-07 | End: 2024-01-04

## 2023-12-08 ENCOUNTER — INFUSION (OUTPATIENT)
Dept: INFUSION THERAPY | Facility: OTHER | Age: 63
End: 2023-12-08
Attending: INTERNAL MEDICINE
Payer: COMMERCIAL

## 2023-12-08 VITALS
RESPIRATION RATE: 16 BRPM | WEIGHT: 178.81 LBS | DIASTOLIC BLOOD PRESSURE: 80 MMHG | BODY MASS INDEX: 26.48 KG/M2 | HEIGHT: 69 IN | HEART RATE: 72 BPM | TEMPERATURE: 98 F | SYSTOLIC BLOOD PRESSURE: 145 MMHG | OXYGEN SATURATION: 95 %

## 2023-12-08 DIAGNOSIS — C90.01 MULTIPLE MYELOMA IN REMISSION: Primary | ICD-10-CM

## 2023-12-08 PROCEDURE — 63600175 PHARM REV CODE 636 W HCPCS: Mod: JZ,JG | Performed by: INTERNAL MEDICINE

## 2023-12-08 PROCEDURE — 96401 CHEMO ANTI-NEOPL SQ/IM: CPT

## 2023-12-08 RX ORDER — BORTEZOMIB 3.5 MG/1
1.3 INJECTION, POWDER, LYOPHILIZED, FOR SOLUTION INTRAVENOUS; SUBCUTANEOUS
Status: COMPLETED | OUTPATIENT
Start: 2023-12-08 | End: 2023-12-08

## 2023-12-08 RX ORDER — HEPARIN 100 UNIT/ML
500 SYRINGE INTRAVENOUS
Status: DISCONTINUED | OUTPATIENT
Start: 2023-12-08 | End: 2023-12-08 | Stop reason: HOSPADM

## 2023-12-08 RX ORDER — SODIUM CHLORIDE 0.9 % (FLUSH) 0.9 %
10 SYRINGE (ML) INJECTION
Status: DISCONTINUED | OUTPATIENT
Start: 2023-12-08 | End: 2023-12-08 | Stop reason: HOSPADM

## 2023-12-08 RX ORDER — LENALIDOMIDE 10 MG/1
1 CAPSULE ORAL SEE ADMIN INSTRUCTIONS
Qty: 21 EACH | Refills: 0 | Status: SHIPPED | OUTPATIENT
Start: 2023-12-08 | End: 2024-01-09 | Stop reason: SDUPTHER

## 2023-12-08 RX ADMIN — BORTEZOMIB 2.5 MG: 3.5 INJECTION, POWDER, LYOPHILIZED, FOR SOLUTION INTRAVENOUS; SUBCUTANEOUS at 09:12

## 2023-12-08 NOTE — PLAN OF CARE
Problem: Adult Inpatient Plan of Care  Goal: Plan of Care Review  Outcome: Ongoing, Progressing  Goal: Patient-Specific Goal (Individualized)  Outcome: Ongoing, Progressing     Problem: Nausea and Vomiting (Chemotherapy Effects)  Goal: Fluid and Electrolyte Balance  Outcome: Ongoing, Progressing    Patient here toaday for SQ Velcade injection. VS & assessment completed with no acute issues noted and injection given in abdominal tissue without complications. All questions answered, patient scheduled for next appt 1-12-24, sent via [facility], and left clinic ambulatory in [facility].

## 2023-12-11 ENCOUNTER — TELEPHONE (OUTPATIENT)
Dept: HEMATOLOGY/ONCOLOGY | Facility: CLINIC | Age: 63
End: 2023-12-11
Payer: COMMERCIAL

## 2023-12-11 NOTE — TELEPHONE ENCOUNTER
----- Message from Irvin Martinez sent at 12/11/2023 10:12 AM CST -----  Type:  Sooner Apoointment Request    Caller is requesting a sooner appointment.  Name of Caller:pt  When is the first available appointment?none  Symptoms:3 month follow up with labs   Would the patient rather a call back or a response via [facility]? call  Best Call Back Number: [phone]  Additional Information: pt also has an infusion scheduled for 01/12 for [facility] but would like to get it moved to main [place] if possible. Please give the pt an call to advise

## 2023-12-20 ENCOUNTER — PATIENT MESSAGE (OUTPATIENT)
Dept: INTERNAL MEDICINE | Facility: CLINIC | Age: 63
End: 2023-12-20

## 2023-12-20 ENCOUNTER — OFFICE VISIT (OUTPATIENT)
Dept: INTERNAL MEDICINE | Facility: CLINIC | Age: 63
End: 2023-12-20
Payer: COMMERCIAL

## 2023-12-20 ENCOUNTER — OFFICE VISIT (OUTPATIENT)
Dept: DERMATOLOGY | Facility: CLINIC | Age: 63
End: 2023-12-20
Payer: COMMERCIAL

## 2023-12-20 VITALS
OXYGEN SATURATION: 97 % | WEIGHT: 179.69 LBS | DIASTOLIC BLOOD PRESSURE: 70 MMHG | SYSTOLIC BLOOD PRESSURE: 112 MMHG | HEIGHT: 68 IN | HEART RATE: 81 BPM | BODY MASS INDEX: 27.23 KG/M2

## 2023-12-20 DIAGNOSIS — D23.9 DERMATOFIBROMA: ICD-10-CM

## 2023-12-20 DIAGNOSIS — L98.9 SKIN LESIONS: Primary | ICD-10-CM

## 2023-12-20 DIAGNOSIS — H81.10 BENIGN PAROXYSMAL POSITIONAL VERTIGO, UNSPECIFIED LATERALITY: ICD-10-CM

## 2023-12-20 DIAGNOSIS — L82.1 SEBORRHEIC KERATOSIS: Primary | ICD-10-CM

## 2023-12-20 DIAGNOSIS — Z12.5 SCREENING FOR MALIGNANT NEOPLASM OF PROSTATE: ICD-10-CM

## 2023-12-20 DIAGNOSIS — D69.6 THROMBOCYTOPENIA, UNSPECIFIED: ICD-10-CM

## 2023-12-20 DIAGNOSIS — I10 ESSENTIAL HYPERTENSION: ICD-10-CM

## 2023-12-20 DIAGNOSIS — I82.5Z3 CHRONIC DEEP VEIN THROMBOSIS (DVT) OF DISTAL VEIN OF BOTH LOWER EXTREMITIES: ICD-10-CM

## 2023-12-20 PROCEDURE — 99999 PR PBB SHADOW E&M-EST. PATIENT-LVL V: CPT | Mod: PBBFAC,,, | Performed by: INTERNAL MEDICINE

## 2023-12-20 PROCEDURE — 3074F PR MOST RECENT SYSTOLIC BLOOD PRESSURE < 130 MM HG: ICD-10-PCS | Mod: CPTII,S$GLB,, | Performed by: INTERNAL MEDICINE

## 2023-12-20 PROCEDURE — 3074F SYST BP LT 130 MM HG: CPT | Mod: CPTII,S$GLB,, | Performed by: INTERNAL MEDICINE

## 2023-12-20 PROCEDURE — 99202 OFFICE O/P NEW SF 15 MIN: CPT | Mod: S$GLB,,, | Performed by: DERMATOLOGY

## 2023-12-20 PROCEDURE — 99214 PR OFFICE/OUTPT VISIT, EST, LEVL IV, 30-39 MIN: ICD-10-PCS | Mod: S$GLB,,, | Performed by: INTERNAL MEDICINE

## 2023-12-20 PROCEDURE — 99214 OFFICE O/P EST MOD 30 MIN: CPT | Mod: S$GLB,,, | Performed by: INTERNAL MEDICINE

## 2023-12-20 PROCEDURE — 99999 PR PBB SHADOW E&M-EST. PATIENT-LVL IV: CPT | Mod: PBBFAC,,, | Performed by: DERMATOLOGY

## 2023-12-20 PROCEDURE — 3078F PR MOST RECENT DIASTOLIC BLOOD PRESSURE < 80 MM HG: ICD-10-PCS | Mod: CPTII,S$GLB,, | Performed by: INTERNAL MEDICINE

## 2023-12-20 PROCEDURE — 1160F RVW MEDS BY RX/DR IN RCRD: CPT | Mod: CPTII,S$GLB,, | Performed by: DERMATOLOGY

## 2023-12-20 PROCEDURE — 3008F PR BODY MASS INDEX (BMI) DOCUMENTED: ICD-10-PCS | Mod: CPTII,S$GLB,, | Performed by: INTERNAL MEDICINE

## 2023-12-20 PROCEDURE — 1159F MED LIST DOCD IN RCRD: CPT | Mod: CPTII,S$GLB,, | Performed by: DERMATOLOGY

## 2023-12-20 PROCEDURE — 1160F PR REVIEW ALL MEDS BY PRESCRIBER/CLIN PHARMACIST DOCUMENTED: ICD-10-PCS | Mod: CPTII,S$GLB,, | Performed by: DERMATOLOGY

## 2023-12-20 PROCEDURE — 99999 PR PBB SHADOW E&M-EST. PATIENT-LVL V: ICD-10-PCS | Mod: PBBFAC,,, | Performed by: INTERNAL MEDICINE

## 2023-12-20 PROCEDURE — 1159F PR MEDICATION LIST DOCUMENTED IN MEDICAL RECORD: ICD-10-PCS | Mod: CPTII,S$GLB,, | Performed by: DERMATOLOGY

## 2023-12-20 PROCEDURE — 3008F BODY MASS INDEX DOCD: CPT | Mod: CPTII,S$GLB,, | Performed by: INTERNAL MEDICINE

## 2023-12-20 PROCEDURE — 99999 PR PBB SHADOW E&M-EST. PATIENT-LVL IV: ICD-10-PCS | Mod: PBBFAC,,, | Performed by: DERMATOLOGY

## 2023-12-20 PROCEDURE — 3078F DIAST BP <80 MM HG: CPT | Mod: CPTII,S$GLB,, | Performed by: INTERNAL MEDICINE

## 2023-12-20 PROCEDURE — 99202 PR OFFICE/OUTPT VISIT, NEW, LEVL II, 15-29 MIN: ICD-10-PCS | Mod: S$GLB,,, | Performed by: DERMATOLOGY

## 2023-12-20 RX ORDER — HYDROCHLOROTHIAZIDE 25 MG/1
25 TABLET ORAL DAILY
Qty: 90 TABLET | Refills: 11 | Status: SHIPPED | OUTPATIENT
Start: 2023-12-20

## 2023-12-20 NOTE — PROGRESS NOTES
Subjective:      Patient ID:  Vicente Connors is a 63 y.o. male who presents for   Chief Complaint   Patient presents with    Lesion     No h/o nmsc or mm.    Lesion - Initial  Affected locations: left upper leg and right upper leg  Duration: Pt states has been present for couple years.  Signs / symptoms: asymptomatic  Relieving factors/Treatments tried: nothing    Lesions right knee and left posterior calf present a long time without change  Left distal thigh lesion more recent      Review of Systems   Skin:  Positive for activity-related sunscreen use. Negative for itching, daily sunscreen use and recent sunburn.   Hematologic/Lymphatic: Does not bruise/bleed easily.       Objective:   Physical Exam   Skin:             See photos below    Diagram Legend     Erythematous scaling macule/papule c/w actinic keratosis       Vascular papule c/w angioma      Pigmented verrucoid papule/plaque c/w seborrheic keratosis      Yellow umbilicated papule c/w sebaceous hyperplasia      Irregularly shaped tan macule c/w lentigo     1-2 mm smooth white papules consistent with Milia      Movable subcutaneous cyst with punctum c/w epidermal inclusion cyst      Subcutaneous movable cyst c/w pilar cyst      Firm pink to brown papule c/w dermatofibroma      Pedunculated fleshy papule(s) c/w skin tag(s)      Evenly pigmented macule c/w junctional nevus     Mildly variegated pigmented, slightly irregular-bordered macule c/w mildly atypical nevus      Flesh colored to evenly pigmented papule c/w intradermal nevus       Pink pearly papule/plaque c/w basal cell carcinoma      Erythematous hyperkeratotic cursted plaque c/w SCC      Surgical scar with no sign of skin cancer recurrence      Open and closed comedones      Inflammatory papules and pustules      Verrucoid papule consistent consistent with wart     Erythematous eczematous patches and plaques     Dystrophic onycholytic nail with subungual debris c/w onychomycosis     Umbilicated  papule    Erythematous-base heme-crusted tan verrucoid plaque consistent with inflamed seborrheic keratosis     Erythematous Silvery Scaling Plaque c/w Psoriasis     See annotation                    Assessment / Plan:        Seborrheic keratosis    Dermatofibroma  -     Ambulatory referral/consult to Dermatology    Reviewed diagnoses and natural history of lesions  Reassured  No treatment indicated or desired         Follow up if symptoms worsen or fail to improve.

## 2023-12-20 NOTE — PROGRESS NOTES
Subjective:       Patient ID: Vicente Connors is a 63 y.o. male.    Chief Complaint: Follow-up    Patient is here for followup for chronic conditions.    Wonders abt PSA testing.    In last few weeks have had 2 bouts of dizziness, one episode while in bed. One while getting up from seated position.  Describes dizziness as a moving sensation.  denies other neurologic deficit -- no weakness, no change in vision, no slurred speech or probs swallowing, no imbalance or problems walking      Has skin lesions on legs wants examined/incl L thigh.      Review of Systems   Constitutional:  Negative for appetite change and unexpected weight change.   Respiratory:  Negative for chest tightness and shortness of breath.    Cardiovascular:  Negative for chest pain.   Gastrointestinal:  Negative for abdominal pain.   Genitourinary:  Negative for difficulty urinating, scrotal swelling and testicular pain.   Skin:         A few lower legs   Neurological:  Positive for dizziness.           Objective:      Physical Exam  Vitals reviewed.   Constitutional:       General: He is not in acute distress.     Appearance: Normal appearance. He is well-developed. He is not ill-appearing, toxic-appearing or diaphoretic.   HENT:      Head: Normocephalic and atraumatic.   Eyes:      General: No scleral icterus.  Neck:      Thyroid: No thyromegaly.   Cardiovascular:      Rate and Rhythm: Normal rate and regular rhythm.      Heart sounds: Normal heart sounds. No murmur heard.     No friction rub. No gallop.   Pulmonary:      Effort: Pulmonary effort is normal. No respiratory distress.      Breath sounds: Normal breath sounds. No wheezing or rales.   Abdominal:      General: Bowel sounds are normal. There is no distension.      Palpations: Abdomen is soft. There is no mass.      Tenderness: There is no abdominal tenderness. There is no guarding or rebound.   Musculoskeletal:         General: Normal range of motion.      Cervical back: Normal range  of motion.   Lymphadenopathy:      Cervical: No cervical adenopathy.   Skin:     Findings: No lesion.      Comments: R leg appears to have fibrous papule and some actinic lesions on L keg   Neurological:      Mental Status: He is alert and oriented to person, place, and time.      Comments: Nml balance, neg romberg and neg Jovon Hallpike   Psychiatric:         Mood and Affect: Mood normal.         Behavior: Behavior normal.         Thought Content: Thought content normal.         Assessment:       1. Skin lesions    2. Screening for malignant neoplasm of prostate    3. Essential hypertension    4. Benign paroxysmal positional vertigo, unspecified laterality    5. Thrombocytopenia, unspecified    6. Chronic deep vein thrombosis (DVT) of distal vein of both lower extremities        Plan:       Vicente was seen today for follow-up.    Diagnoses and all orders for this visit:    Skin lesions  -     Ambulatory referral/consult to Dermatology; Future    Screening for malignant neoplasm of prostate  -     PSA, Screening; Future    Essential hypertension  -     hydroCHLOROthiazide (HYDRODIURIL) 25 MG tablet; Take 1 tablet (25 mg total) by mouth once daily.  Inc dose since home pressures elevated    Benign paroxysmal positional vertigo, unspecified laterality  Sounds like vertigo, last episode abt 2 weeks ago. Call if symptoms worsen    Thrombocytopenia, unspecified  From Myeloma, sees hematologist    Chronic deep vein thrombosis (DVT) of distal vein of both lower extremities  On thinner      Health Maintenance         Date Due Completion Date    Hepatitis C Screening Never done ---    HIV Screening Never done ---    TETANUS VACCINE Never done ---    Shingles Vaccine (1 of 2) Never done ---    RSV Vaccine (Age 60+ and Pregnant patients) (1 - 1-dose 60+ series) Never done ---    COVID-19 Vaccine (4 - 2023-24 season) 09/24/2023 7/30/2023    Hemoglobin A1c (Diabetic Prevention Screening) 03/15/2024 3/15/2021    Lipid Panel  03/15/2026 3/15/2021    Colorectal Cancer Screening 07/11/2032 7/11/2022            Follow up in about 1 year (around 12/20/2024).    Future Appointments   Date Time Provider Department Center   1/12/2024  2:20 PM [facility]   1/12/2024  3:40 PM Cris Burt MD [facility] B[facility] [place]   1/12/2024  5:00 PM NURSE 9, [facility]   2/2/2024  7:15 AM LAB, [facility]   2/5/2024  8:00 AM CHEMO 07 [facility]   12/20/2024  1:00 PM Norris Pantoja MD [facility]

## 2023-12-21 ENCOUNTER — PATIENT MESSAGE (OUTPATIENT)
Dept: DERMATOLOGY | Facility: CLINIC | Age: 63
End: 2023-12-21
Payer: COMMERCIAL

## 2023-12-21 ENCOUNTER — NURSE TRIAGE (OUTPATIENT)
Dept: ADMINISTRATIVE | Facility: CLINIC | Age: 63
End: 2023-12-21
Payer: COMMERCIAL

## 2023-12-21 ENCOUNTER — TELEPHONE (OUTPATIENT)
Dept: HEMATOLOGY/ONCOLOGY | Facility: CLINIC | Age: 63
End: 2023-12-21
Payer: COMMERCIAL

## 2023-12-21 ENCOUNTER — OFFICE VISIT (OUTPATIENT)
Dept: URGENT CARE | Facility: CLINIC | Age: 63
End: 2023-12-21
Payer: COMMERCIAL

## 2023-12-21 ENCOUNTER — PATIENT MESSAGE (OUTPATIENT)
Dept: HEMATOLOGY/ONCOLOGY | Facility: CLINIC | Age: 63
End: 2023-12-21
Payer: COMMERCIAL

## 2023-12-21 VITALS
TEMPERATURE: 99 F | HEIGHT: 68 IN | DIASTOLIC BLOOD PRESSURE: 81 MMHG | SYSTOLIC BLOOD PRESSURE: 129 MMHG | WEIGHT: 179.69 LBS | OXYGEN SATURATION: 97 % | HEART RATE: 77 BPM | RESPIRATION RATE: 18 BRPM | BODY MASS INDEX: 27.23 KG/M2

## 2023-12-21 DIAGNOSIS — R05.9 COUGH, UNSPECIFIED TYPE: ICD-10-CM

## 2023-12-21 DIAGNOSIS — D80.9 IMMUNOGLOBULIN DEFICIENCY: ICD-10-CM

## 2023-12-21 DIAGNOSIS — U07.1 COVID-19: Primary | ICD-10-CM

## 2023-12-21 DIAGNOSIS — C90.01 MULTIPLE MYELOMA IN REMISSION: ICD-10-CM

## 2023-12-21 LAB
CTP QC/QA: YES
SARS-COV-2 AG RESP QL IA.RAPID: POSITIVE

## 2023-12-21 PROCEDURE — 99214 PR OFFICE/OUTPT VISIT, EST, LEVL IV, 30-39 MIN: ICD-10-PCS | Mod: S$GLB,,, | Performed by: FAMILY MEDICINE

## 2023-12-21 PROCEDURE — 87811 SARS CORONAVIRUS 2 ANTIGEN POCT, MANUAL READ: ICD-10-PCS | Mod: QW,S$GLB,, | Performed by: FAMILY MEDICINE

## 2023-12-21 PROCEDURE — 87811 SARS-COV-2 COVID19 W/OPTIC: CPT | Mod: QW,S$GLB,, | Performed by: FAMILY MEDICINE

## 2023-12-21 PROCEDURE — 99214 OFFICE O/P EST MOD 30 MIN: CPT | Mod: S$GLB,,, | Performed by: FAMILY MEDICINE

## 2023-12-21 RX ORDER — AZITHROMYCIN 250 MG/1
TABLET, FILM COATED ORAL
Qty: 6 TABLET | Refills: 0 | Status: SHIPPED | OUTPATIENT
Start: 2023-12-21 | End: 2023-12-21 | Stop reason: CLARIF

## 2023-12-21 RX ORDER — NIRMATRELVIR AND RITONAVIR 300-100 MG
KIT ORAL
Qty: 30 TABLET | Refills: 0 | Status: SHIPPED | OUTPATIENT
Start: 2023-12-21 | End: 2023-12-26

## 2023-12-21 NOTE — TELEPHONE ENCOUNTER
----- Message from Aurora Morrell sent at 12/21/2023  9:51 AM CST -----  Regarding: pt advise  Contact: Pt  Type:  Needs Medical Advice    Who Called: Pt  Symptoms (please be specific): Positive Covid   How long has patient had these symptoms: Sunday, 12/17  Pharmacy name and phone #:   [facility] , Phone - Telephone   [phone]   Would the patient rather a call back or a response via [facility]?   Best Call Back Number:   [phone]  Additional Information: Please call to advise, Thank You

## 2023-12-21 NOTE — PROGRESS NOTES
"Subjective:      Patient ID: Vicente Connors is a 63 y.o. male.    Vitals:  height is 5' 8" (1.727 m) and weight is 81.5 kg (179 lb 10.8 oz). His temperature is 98.7 °F (37.1 °C). His blood pressure is 129/81 and his pulse is 77. His respiration is 18 and oxygen saturation is 97%.     Chief Complaint: Nasal Congestion    Pt presents a  cough and nasal congestion that started Sunday. Pt was exposed to covid over thw weekend. Positive at home Covid  test. Taking otc dayquil and Nyquil cold and flu.    Cough  This is a new problem. The current episode started in the past 7 days. The problem has been unchanged. The problem occurs every few minutes. Associated symptoms include nasal congestion, postnasal drip and rhinorrhea. Pertinent negatives include no fever, headaches or sore throat. He has tried OTC cough suppressant for the symptoms.       Constitution: Negative for fever.   HENT:  Positive for postnasal drip. Negative for sore throat.    Respiratory:  Positive for cough.    Neurological:  Negative for headaches.      Objective:     Physical Exam   Constitutional: He is oriented to person, place, and time. He appears well-developed. He is cooperative.  Non-toxic appearance. He does not appear ill. No distress.   HENT:   Head: Normocephalic and atraumatic.   Ears:   Right Ear: Hearing, tympanic membrane, external ear and ear canal normal.   Left Ear: Hearing, tympanic membrane, external ear and ear canal normal.   Nose: Congestion present. No mucosal edema, rhinorrhea or nasal deformity. No epistaxis. Right sinus exhibits no maxillary sinus tenderness and no frontal sinus tenderness. Left sinus exhibits no maxillary sinus tenderness and no frontal sinus tenderness.   Mouth/Throat: Uvula is midline, oropharynx is clear and moist and mucous membranes are normal. Mucous membranes are moist. No trismus in the jaw. Normal dentition. No uvula swelling. No oropharyngeal exudate, posterior oropharyngeal edema or posterior " oropharyngeal erythema. Oropharynx is clear.   Eyes: Conjunctivae and lids are normal. No scleral icterus.   Neck: Trachea normal and phonation normal. Neck supple. No edema present. No erythema present. No neck rigidity present.   Cardiovascular: Normal rate, regular rhythm, normal heart sounds and normal pulses.   Pulmonary/Chest: Effort normal and breath sounds normal. No respiratory distress. He has no decreased breath sounds. He has no rhonchi.   Abdominal: Normal appearance.   Musculoskeletal: Normal range of motion.         General: No deformity or edema. Normal range of motion.   Neurological: He is alert and oriented to person, place, and time. He exhibits normal muscle tone. Coordination normal.   Skin: Skin is warm, dry, intact, not diaphoretic and not pale.   Psychiatric: His speech is normal and behavior is normal. Judgment and thought content normal.   Nursing note and vitals reviewed.      Assessment:     1. COVID-19    2. Cough, unspecified type    3. Immunoglobulin deficiency    4. Multiple myeloma in remission        Plan:       COVID-19    Cough, unspecified type  -     SARS Coronavirus 2 Antigen, POCT Manual Read  -  Immunoglobulin deficiency  Multiple myeloma in remission  -     Pt or guardian provided educational materials and instructions regarding their visit diagnosis.

## 2023-12-21 NOTE — TELEPHONE ENCOUNTER
----- Message from Aurora Morrell sent at 12/21/2023  9:51 AM CST -----  Regarding: pt advise  Contact: Pt  Type:  Needs Medical Advice    Who Called: Pt  Symptoms (please be specific): Positive Covid   How long has patient had these symptoms: Sunday, 12/17  Pharmacy name and phone #:   [facility] , Phone - Telephone   [phone]   Would the patient rather a call back or a response via [facility]?   Best Call Back Number:   [phone]  Additional Information: Please call to advise, Thank You

## 2023-12-22 NOTE — TELEPHONE ENCOUNTER
Question about Paxlovid interactions. Was advised to stop xarelto.   Does Revlimid need held as well?  Took xarelto and revlimid already today.   Call out to MD Coronel for recommendation.   When to begin paxlovid?- fine to begin this PM  Stop Revlimid while on Paxlovid and resume 2 days after finishing the paxlovid.   All instructions provided to pt, whom VU.       Reason for Disposition   [1] Caller has URGENT medicine question about med that PCP or specialist prescribed AND [2] triager unable to answer question    Protocols used: Medication Question Call-A-

## 2023-12-27 ENCOUNTER — PATIENT MESSAGE (OUTPATIENT)
Dept: RESEARCH | Facility: HOSPITAL | Age: 63
End: 2023-12-27
Payer: COMMERCIAL

## 2023-12-31 DIAGNOSIS — C90.01 MULTIPLE MYELOMA IN REMISSION: ICD-10-CM

## 2023-12-31 DIAGNOSIS — C92.31 MYELOID SARCOMA IN REMISSION: Primary | ICD-10-CM

## 2024-01-01 ENCOUNTER — OFFICE VISIT (OUTPATIENT)
Dept: URGENT CARE | Facility: CLINIC | Age: 64
End: 2024-01-01
Payer: COMMERCIAL

## 2024-01-01 VITALS
TEMPERATURE: 98 F | OXYGEN SATURATION: 95 % | BODY MASS INDEX: 27.13 KG/M2 | SYSTOLIC BLOOD PRESSURE: 134 MMHG | WEIGHT: 179 LBS | HEART RATE: 80 BPM | HEIGHT: 68 IN | RESPIRATION RATE: 17 BRPM | DIASTOLIC BLOOD PRESSURE: 84 MMHG

## 2024-01-01 DIAGNOSIS — S05.92XA LEFT EYE INJURY, INITIAL ENCOUNTER: ICD-10-CM

## 2024-01-01 DIAGNOSIS — S05.02XA ABRASION OF LEFT CONJUNCTIVA, INITIAL ENCOUNTER: Primary | ICD-10-CM

## 2024-01-01 PROCEDURE — 99213 OFFICE O/P EST LOW 20 MIN: CPT | Mod: S$GLB,,, | Performed by: FAMILY MEDICINE

## 2024-01-01 RX ORDER — LENALIDOMIDE 10 MG/1
CAPSULE ORAL
Qty: 21 EACH | Refills: 0 | Status: SHIPPED | OUTPATIENT
Start: 2024-01-01 | End: 2024-01-02 | Stop reason: SDUPTHER

## 2024-01-01 RX ORDER — ERYTHROMYCIN 5 MG/G
OINTMENT OPHTHALMIC 3 TIMES DAILY
Qty: 3.5 G | Refills: 0 | Status: SHIPPED | OUTPATIENT
Start: 2024-01-01

## 2024-01-01 NOTE — PROGRESS NOTES
"Subjective:      Patient ID: Vicente Connors is a 63 y.o. male.    Vitals:  height is 5' 8" (1.727 m) and weight is 81.2 kg (179 lb). His oral temperature is 98.2 °F (36.8 °C). His blood pressure is 134/84 and his pulse is 80. His respiration is 17 and oxygen saturation is 95%.     Chief Complaint: Injury (Scratch (with minor bleeding) on the sclera of my left eye - Entered by patient)    Pt states he was brushing his hand across his face and his thumbnail scratched left eye causing redness to ultrasound of eye.  Patient denies blurry vision, eye pain, discharge.    Injury  This is a new problem. The current episode started yesterday. The problem occurs constantly. The problem has been unchanged. Treatments tried: eye drops.     ROS   Objective:     Physical Exam   Constitutional: He is oriented to person, place, and time. He appears well-developed.   HENT:   Head: Normocephalic and atraumatic.   Ears:   Right Ear: External ear normal.   Left Ear: External ear normal.   Nose: Nose normal. No rhinorrhea or congestion.   Mouth/Throat: Oropharynx is clear and moist. No oropharyngeal exudate or posterior oropharyngeal erythema.   Eyes: Conjunctivae, EOM and lids are normal. Pupils are equal, round, and reactive to light. Extraocular movement intact      Comments:    Left Eye:    +ve conjunctival erythema.   No discharge.  No swelling.  Visual acuity at baseline.    Fluorescein Exam: negative for corneal abrasion or FB.       Neck: Trachea normal and phonation normal. Neck supple.   Cardiovascular:   No murmur heard.  Pulmonary/Chest: No stridor. No respiratory distress. He has no wheezes. He has no rales.   Musculoskeletal: Normal range of motion.         General: Normal range of motion.   Neurological: He is alert and oriented to person, place, and time.   Skin: Skin is warm, dry and intact.   Psychiatric: His speech is normal and behavior is normal. Judgment and thought content normal.   Nursing note and vitals " reviewed.      Assessment:     1. Abrasion of left conjunctiva, initial encounter    2. Left eye injury, initial encounter        Plan:   Discussed exam findings/diagnosis/plan with patient in clinic. Advised to f/u with PCP within 2-5 days.  Advised to follow-up with ophthalmologist if symptoms get any worse.  ER precautions given if symptoms get any worse. All questions answered. Patient verbally understood and agreed with treatment plan.     Abrasion of left conjunctiva, initial encounter    Left eye injury, initial encounter    Other orders  -     erythromycin (ROMYCIN) ophthalmic ointment; Place into the left eye 3 (three) times daily.  Dispense: 3.5 g; Refill: 0

## 2024-01-02 ENCOUNTER — TELEPHONE (OUTPATIENT)
Dept: RESEARCH | Facility: HOSPITAL | Age: 64
End: 2024-01-02
Payer: COMMERCIAL

## 2024-01-02 DIAGNOSIS — C90.01 MULTIPLE MYELOMA IN REMISSION: ICD-10-CM

## 2024-01-02 RX ORDER — LENALIDOMIDE 10 MG/1
1 CAPSULE ORAL SEE ADMIN INSTRUCTIONS
Qty: 21 EACH | Refills: 0 | Status: SHIPPED | OUTPATIENT
Start: 2024-01-02 | End: 2024-01-09 | Stop reason: SDUPTHER

## 2024-01-08 ENCOUNTER — PATIENT MESSAGE (OUTPATIENT)
Dept: HEMATOLOGY/ONCOLOGY | Facility: CLINIC | Age: 64
End: 2024-01-08
Payer: COMMERCIAL

## 2024-01-08 ENCOUNTER — PATIENT MESSAGE (OUTPATIENT)
Dept: INTERNAL MEDICINE | Facility: CLINIC | Age: 64
End: 2024-01-08
Payer: COMMERCIAL

## 2024-01-08 DIAGNOSIS — C90.01 MULTIPLE MYELOMA IN REMISSION: ICD-10-CM

## 2024-01-08 DIAGNOSIS — Z00.00 ROUTINE GENERAL MEDICAL EXAMINATION AT A HEALTH CARE FACILITY: Primary | ICD-10-CM

## 2024-01-08 RX ORDER — LENALIDOMIDE 10 MG/1
CAPSULE ORAL
Qty: 21 EACH | Refills: 0 | Status: SHIPPED | OUTPATIENT
Start: 2024-01-08 | End: 2024-01-09 | Stop reason: SDUPTHER

## 2024-01-08 NOTE — TELEPHONE ENCOUNTER
"----- Message from Mae Fernando sent at 1/8/2024 11:29 AM CST -----  Regarding: Pt advice  Contact:[facility]     [facility] requesting sharonda gene auth for the following meds lenalidomide (REVLIMID) 10 mg Cap. Please call and adv @      Confirmed contact below:    Contact Name:  [facility]  Phone Number: [phone]               Additional Notes:  "Thank you for all that you do for our patients"                                           "

## 2024-01-09 DIAGNOSIS — C90.01 MULTIPLE MYELOMA IN REMISSION: ICD-10-CM

## 2024-01-09 RX ORDER — LENALIDOMIDE 10 MG/1
1 CAPSULE ORAL SEE ADMIN INSTRUCTIONS
Qty: 21 EACH | Refills: 0 | Status: SHIPPED | OUTPATIENT
Start: 2024-01-09 | End: 2024-01-19 | Stop reason: SDUPTHER

## 2024-01-10 ENCOUNTER — PATIENT MESSAGE (OUTPATIENT)
Dept: HEMATOLOGY/ONCOLOGY | Facility: CLINIC | Age: 64
End: 2024-01-10
Payer: COMMERCIAL

## 2024-01-10 ENCOUNTER — TELEPHONE (OUTPATIENT)
Dept: HEMATOLOGY/ONCOLOGY | Facility: CLINIC | Age: 64
End: 2024-01-10
Payer: COMMERCIAL

## 2024-01-10 NOTE — TELEPHONE ENCOUNTER
Called Mr. Connors to follow up on portal message sent recently regarding an [facility] research study he qualifies to participate in. Discussed Covid-19 [facility] study. He expressed that he is out of town and unable to participate.

## 2024-01-11 ENCOUNTER — PATIENT MESSAGE (OUTPATIENT)
Dept: HEMATOLOGY/ONCOLOGY | Facility: CLINIC | Age: 64
End: 2024-01-11
Payer: COMMERCIAL

## 2024-01-11 ENCOUNTER — TELEPHONE (OUTPATIENT)
Dept: HEMATOLOGY/ONCOLOGY | Facility: CLINIC | Age: 64
End: 2024-01-11
Payer: COMMERCIAL

## 2024-01-11 NOTE — TELEPHONE ENCOUNTER
"----- Message from Mae Fernando sent at 1/11/2024  9:52 AM CST -----  Regarding: Pt advice  Contact: Pt    Pt requesting call back in regards to getting issued resolved. Pt stated he was informed by insurance company Dr would need to override for the following meds lenalidomide (REVLIMID) 10 mg Cap due to exceeding plan.   Please call and adv @          Confirmed contact below:   Contact Name: Vicente Connors  Phone Number: [phone]           Additional Notes:  "Thank you for all that you do for our patients"                                           "

## 2024-01-12 ENCOUNTER — TELEPHONE (OUTPATIENT)
Dept: HEMATOLOGY/ONCOLOGY | Facility: CLINIC | Age: 64
End: 2024-01-12
Payer: COMMERCIAL

## 2024-01-12 ENCOUNTER — CLINICAL SUPPORT (OUTPATIENT)
Dept: INTERNAL MEDICINE | Facility: CLINIC | Age: 64
End: 2024-01-12
Payer: COMMERCIAL

## 2024-01-12 ENCOUNTER — LAB VISIT (OUTPATIENT)
Dept: LAB | Facility: HOSPITAL | Age: 64
End: 2024-01-12
Attending: INTERNAL MEDICINE
Payer: COMMERCIAL

## 2024-01-12 ENCOUNTER — INFUSION (OUTPATIENT)
Dept: INFUSION THERAPY | Facility: HOSPITAL | Age: 64
End: 2024-01-12
Attending: INTERNAL MEDICINE
Payer: COMMERCIAL

## 2024-01-12 ENCOUNTER — OFFICE VISIT (OUTPATIENT)
Dept: HEMATOLOGY/ONCOLOGY | Facility: CLINIC | Age: 64
End: 2024-01-12
Payer: COMMERCIAL

## 2024-01-12 VITALS — DIASTOLIC BLOOD PRESSURE: 84 MMHG | OXYGEN SATURATION: 98 % | SYSTOLIC BLOOD PRESSURE: 122 MMHG | HEART RATE: 72 BPM

## 2024-01-12 VITALS
DIASTOLIC BLOOD PRESSURE: 72 MMHG | OXYGEN SATURATION: 97 % | RESPIRATION RATE: 18 BRPM | HEIGHT: 68 IN | HEART RATE: 69 BPM | BODY MASS INDEX: 27.46 KG/M2 | SYSTOLIC BLOOD PRESSURE: 138 MMHG | WEIGHT: 181.19 LBS | TEMPERATURE: 98 F

## 2024-01-12 VITALS
BODY MASS INDEX: 27.46 KG/M2 | RESPIRATION RATE: 16 BRPM | DIASTOLIC BLOOD PRESSURE: 83 MMHG | SYSTOLIC BLOOD PRESSURE: 154 MMHG | OXYGEN SATURATION: 97 % | WEIGHT: 181.19 LBS | HEART RATE: 69 BPM | TEMPERATURE: 98 F | HEIGHT: 68 IN

## 2024-01-12 DIAGNOSIS — I10 ESSENTIAL HYPERTENSION: Primary | ICD-10-CM

## 2024-01-12 DIAGNOSIS — C90.01 MULTIPLE MYELOMA IN REMISSION: Primary | ICD-10-CM

## 2024-01-12 DIAGNOSIS — G62.9 PERIPHERAL POLYNEUROPATHY: ICD-10-CM

## 2024-01-12 DIAGNOSIS — Z94.81 AUTOLOGOUS BONE MARROW TRANSPLANTATION STATUS: Primary | ICD-10-CM

## 2024-01-12 DIAGNOSIS — C90.01 MULTIPLE MYELOMA IN REMISSION: ICD-10-CM

## 2024-01-12 DIAGNOSIS — D61.818 PANCYTOPENIA: ICD-10-CM

## 2024-01-12 DIAGNOSIS — C90.00 IGA MYELOMA: ICD-10-CM

## 2024-01-12 DIAGNOSIS — Z00.00 ROUTINE GENERAL MEDICAL EXAMINATION AT A HEALTH CARE FACILITY: ICD-10-CM

## 2024-01-12 DIAGNOSIS — Z12.5 SCREENING FOR MALIGNANT NEOPLASM OF PROSTATE: ICD-10-CM

## 2024-01-12 DIAGNOSIS — Z94.81 AUTOLOGOUS BONE MARROW TRANSPLANTATION STATUS: ICD-10-CM

## 2024-01-12 LAB
ALBUMIN SERPL BCP-MCNC: 3.5 G/DL (ref 3.5–5.2)
ALP SERPL-CCNC: 54 U/L (ref 55–135)
ALT SERPL W/O P-5'-P-CCNC: 26 U/L (ref 10–44)
ANION GAP SERPL CALC-SCNC: 6 MMOL/L (ref 8–16)
AST SERPL-CCNC: 26 U/L (ref 10–40)
BASOPHILS # BLD AUTO: 0.02 K/UL (ref 0–0.2)
BASOPHILS NFR BLD: 0.5 % (ref 0–1.9)
BILIRUB SERPL-MCNC: 1.5 MG/DL (ref 0.1–1)
BUN SERPL-MCNC: 12 MG/DL (ref 8–23)
CALCIUM SERPL-MCNC: 9.1 MG/DL (ref 8.7–10.5)
CHLORIDE SERPL-SCNC: 106 MMOL/L (ref 95–110)
CHOLEST SERPL-MCNC: 198 MG/DL (ref 120–199)
CHOLEST/HDLC SERPL: 3.4 {RATIO} (ref 2–5)
CO2 SERPL-SCNC: 27 MMOL/L (ref 23–29)
COMPLEXED PSA SERPL-MCNC: 2.9 NG/ML (ref 0–4)
CREAT SERPL-MCNC: 0.9 MG/DL (ref 0.5–1.4)
DIFFERENTIAL METHOD BLD: ABNORMAL
EOSINOPHIL # BLD AUTO: 0 K/UL (ref 0–0.5)
EOSINOPHIL NFR BLD: 0.5 % (ref 0–8)
ERYTHROCYTE [DISTWIDTH] IN BLOOD BY AUTOMATED COUNT: 14.6 % (ref 11.5–14.5)
EST. GFR  (NO RACE VARIABLE): >60 ML/MIN/1.73 M^2
GLUCOSE SERPL-MCNC: 93 MG/DL (ref 70–110)
HCT VFR BLD AUTO: 39 % (ref 40–54)
HDLC SERPL-MCNC: 59 MG/DL (ref 40–75)
HDLC SERPL: 29.8 % (ref 20–50)
HGB BLD-MCNC: 13.2 G/DL (ref 14–18)
IGA SERPL-MCNC: 191 MG/DL (ref 40–350)
IGG SERPL-MCNC: 735 MG/DL (ref 650–1600)
IGM SERPL-MCNC: 13 MG/DL (ref 50–300)
IMM GRANULOCYTES # BLD AUTO: 0.01 K/UL (ref 0–0.04)
IMM GRANULOCYTES NFR BLD AUTO: 0.3 % (ref 0–0.5)
LDLC SERPL CALC-MCNC: 97.8 MG/DL (ref 63–159)
LYMPHOCYTES # BLD AUTO: 1.7 K/UL (ref 1–4.8)
LYMPHOCYTES NFR BLD: 45.3 % (ref 18–48)
MAGNESIUM SERPL-MCNC: 1.8 MG/DL (ref 1.6–2.6)
MCH RBC QN AUTO: 32.7 PG (ref 27–31)
MCHC RBC AUTO-ENTMCNC: 33.8 G/DL (ref 32–36)
MCV RBC AUTO: 97 FL (ref 82–98)
MONOCYTES # BLD AUTO: 0.5 K/UL (ref 0.3–1)
MONOCYTES NFR BLD: 12.3 % (ref 4–15)
NEUTROPHILS # BLD AUTO: 1.5 K/UL (ref 1.8–7.7)
NEUTROPHILS NFR BLD: 41.1 % (ref 38–73)
NONHDLC SERPL-MCNC: 139 MG/DL
NRBC BLD-RTO: 0 /100 WBC
PHOSPHATE SERPL-MCNC: 3.1 MG/DL (ref 2.7–4.5)
PLATELET # BLD AUTO: 139 K/UL (ref 150–450)
PMV BLD AUTO: 9.3 FL (ref 9.2–12.9)
POTASSIUM SERPL-SCNC: 3.6 MMOL/L (ref 3.5–5.1)
PROT SERPL-MCNC: 6.4 G/DL (ref 6–8.4)
RBC # BLD AUTO: 4.04 M/UL (ref 4.6–6.2)
SODIUM SERPL-SCNC: 139 MMOL/L (ref 136–145)
TRIGL SERPL-MCNC: 206 MG/DL (ref 30–150)
WBC # BLD AUTO: 3.75 K/UL (ref 3.9–12.7)

## 2024-01-12 PROCEDURE — 83521 IG LIGHT CHAINS FREE EACH: CPT | Performed by: INTERNAL MEDICINE

## 2024-01-12 PROCEDURE — 83735 ASSAY OF MAGNESIUM: CPT | Performed by: NURSE PRACTITIONER

## 2024-01-12 PROCEDURE — 3008F BODY MASS INDEX DOCD: CPT | Mod: CPTII,S$GLB,, | Performed by: INTERNAL MEDICINE

## 2024-01-12 PROCEDURE — 80053 COMPREHEN METABOLIC PANEL: CPT | Performed by: INTERNAL MEDICINE

## 2024-01-12 PROCEDURE — 99214 OFFICE O/P EST MOD 30 MIN: CPT | Mod: S$GLB,,, | Performed by: INTERNAL MEDICINE

## 2024-01-12 PROCEDURE — 99999 PR PBB SHADOW E&M-EST. PATIENT-LVL II: CPT | Mod: PBBFAC,,,

## 2024-01-12 PROCEDURE — 99999 PR PBB SHADOW E&M-EST. PATIENT-LVL III: CPT | Mod: PBBFAC,,, | Performed by: INTERNAL MEDICINE

## 2024-01-12 PROCEDURE — 3077F SYST BP >= 140 MM HG: CPT | Mod: CPTII,S$GLB,, | Performed by: INTERNAL MEDICINE

## 2024-01-12 PROCEDURE — 82784 ASSAY IGA/IGD/IGG/IGM EACH: CPT | Mod: 59 | Performed by: INTERNAL MEDICINE

## 2024-01-12 PROCEDURE — 25000003 PHARM REV CODE 250: Performed by: INTERNAL MEDICINE

## 2024-01-12 PROCEDURE — 36415 COLL VENOUS BLD VENIPUNCTURE: CPT | Performed by: INTERNAL MEDICINE

## 2024-01-12 PROCEDURE — 84165 PROTEIN E-PHORESIS SERUM: CPT | Performed by: INTERNAL MEDICINE

## 2024-01-12 PROCEDURE — 96374 THER/PROPH/DIAG INJ IV PUSH: CPT

## 2024-01-12 PROCEDURE — 96401 CHEMO ANTI-NEOPL SQ/IM: CPT

## 2024-01-12 PROCEDURE — A4216 STERILE WATER/SALINE, 10 ML: HCPCS | Performed by: INTERNAL MEDICINE

## 2024-01-12 PROCEDURE — 85025 COMPLETE CBC W/AUTO DIFF WBC: CPT | Performed by: INTERNAL MEDICINE

## 2024-01-12 PROCEDURE — 84165 PROTEIN E-PHORESIS SERUM: CPT | Mod: 26,,, | Performed by: PATHOLOGY

## 2024-01-12 PROCEDURE — 63600175 PHARM REV CODE 636 W HCPCS: Performed by: INTERNAL MEDICINE

## 2024-01-12 PROCEDURE — 84153 ASSAY OF PSA TOTAL: CPT | Performed by: INTERNAL MEDICINE

## 2024-01-12 PROCEDURE — 80061 LIPID PANEL: CPT | Performed by: INTERNAL MEDICINE

## 2024-01-12 PROCEDURE — 96367 TX/PROPH/DG ADDL SEQ IV INF: CPT

## 2024-01-12 PROCEDURE — 84100 ASSAY OF PHOSPHORUS: CPT | Performed by: NURSE PRACTITIONER

## 2024-01-12 PROCEDURE — 3079F DIAST BP 80-89 MM HG: CPT | Mod: CPTII,S$GLB,, | Performed by: INTERNAL MEDICINE

## 2024-01-12 RX ORDER — BORTEZOMIB 3.5 MG/1
1.3 INJECTION, POWDER, LYOPHILIZED, FOR SOLUTION INTRAVENOUS; SUBCUTANEOUS
Status: COMPLETED | OUTPATIENT
Start: 2024-01-12 | End: 2024-01-12

## 2024-01-12 RX ORDER — HEPARIN 100 UNIT/ML
500 SYRINGE INTRAVENOUS
Status: CANCELLED | OUTPATIENT
Start: 2024-01-12

## 2024-01-12 RX ORDER — SODIUM CHLORIDE 0.9 % (FLUSH) 0.9 %
10 SYRINGE (ML) INJECTION
Status: CANCELLED | OUTPATIENT
Start: 2024-01-12

## 2024-01-12 RX ORDER — BORTEZOMIB 3.5 MG/1
1.3 INJECTION, POWDER, LYOPHILIZED, FOR SOLUTION INTRAVENOUS; SUBCUTANEOUS
Status: CANCELLED | OUTPATIENT
Start: 2024-02-14

## 2024-01-12 RX ORDER — ZOLEDRONIC ACID 0.04 MG/ML
4 INJECTION, SOLUTION INTRAVENOUS
Status: CANCELLED | OUTPATIENT
Start: 2024-01-12

## 2024-01-12 RX ORDER — SODIUM CHLORIDE 0.9 % (FLUSH) 0.9 %
10 SYRINGE (ML) INJECTION
Status: DISCONTINUED | OUTPATIENT
Start: 2024-01-12 | End: 2024-01-12 | Stop reason: HOSPADM

## 2024-01-12 RX ORDER — BORTEZOMIB 3.5 MG/1
1.3 INJECTION, POWDER, LYOPHILIZED, FOR SOLUTION INTRAVENOUS; SUBCUTANEOUS
Status: CANCELLED | OUTPATIENT
Start: 2024-01-12

## 2024-01-12 RX ORDER — ZOLEDRONIC ACID 0.04 MG/ML
4 INJECTION, SOLUTION INTRAVENOUS
Status: COMPLETED | OUTPATIENT
Start: 2024-01-12 | End: 2024-01-12

## 2024-01-12 RX ORDER — HEPARIN 100 UNIT/ML
500 SYRINGE INTRAVENOUS
Status: CANCELLED | OUTPATIENT
Start: 2024-02-14

## 2024-01-12 RX ORDER — SODIUM CHLORIDE 0.9 % (FLUSH) 0.9 %
10 SYRINGE (ML) INJECTION
Status: CANCELLED | OUTPATIENT
Start: 2024-02-14

## 2024-01-12 RX ADMIN — BORTEZOMIB 2.5 MG: 3.5 INJECTION, POWDER, LYOPHILIZED, FOR SOLUTION INTRAVENOUS; SUBCUTANEOUS at 05:01

## 2024-01-12 RX ADMIN — ZOLEDRONIC ACID 4 MG: 0.04 INJECTION, SOLUTION INTRAVENOUS at 05:01

## 2024-01-12 RX ADMIN — SODIUM CHLORIDE: 9 INJECTION, SOLUTION INTRAVENOUS at 04:01

## 2024-01-12 RX ADMIN — Medication 10 ML: at 05:01

## 2024-01-12 NOTE — PROGRESS NOTES
Vicente Connors 63 y.o. male is here for Blood Pressure check. in person    Manual Blood pressure reading was  122/84, Pulse 72. (Checked at the beginning of the visit)    If high, was it repeated after 15 minutes? no    Pt's Home blood pressure machine read in office 138/81, Pulse 81.     Diagnosed with Hypertension no.    Patient took blood pressure medication today yes.     All Medications and OTC medication updated yes    Does patient have record of home blood pressure readings / Blood Pressure Log no.     Does the pt have any complaints today in regards to their blood pressure medication? no. Complains of NA. Patient is asymptomatic.     Were you sitting still for 5-10 minutes prior to taking your Blood pressure? yes     Has your blood pressure monitor ever been checked? yes When was last time we checked your blood pressure monitor? 01/12/2024    Updated vitals yes        Creatinine   Date Value Ref Range Status   12/04/2023 0.9 0.5 - 1.4 mg/dL Final     Sodium   Date Value Ref Range Status   12/04/2023 139 136 - 145 mmol/L Final     Potassium   Date Value Ref Range Status   12/04/2023 3.7 3.5 - 5.1 mmol/L Final      Manual Blood pressure reading was  122/84, Pulse 72. (Checked at the end of the visit)    If high, was it repeated after 15 minutes? no    Dr. Pantoja notified.

## 2024-01-12 NOTE — PLAN OF CARE
Pt tolerated Zometa and Velcade well. No adverse reaction noted. Pt education reinforced on chemo regimen, side effects, what to expect, and when to call . Pt verbalized understanding. I reviewed pt calendar w/ pt and understanding verbalized.

## 2024-01-12 NOTE — TELEPHONE ENCOUNTER
"----- Message from Mae Fernando sent at 1/12/2024  1:03 PM CST -----  Regarding: Pt advice  Contact: [facility] calling to make sure high dollar limit review form has been received for the following meds lenalidomide (REVLIMID) 10 mg Cap.   Please call and adv @     Confirmed contact below:   Contact Name: [facility]  Phone Number: [phone]               Additional Notes:  "Thank you for all that you do for our patients"                                           "

## 2024-01-12 NOTE — PROGRESS NOTES
Chief Complaint : Multiple myeloma, s/p tandem auto SCT, on VRd maintenance, hematology follow up      History of Present Illness : Vicente Connors is a 63 y.o. male here for hematology follow up. He has recently moved to [place] from [place]. He has history of stage IIIA, [employer] stage II IgA kappa multiple myeloma, which is likely intermediate-risk based upon the presence of t(4;14) with hyperdiploidy, based on records available through care everywhere.    IgA Kappa multiple Myeloma was diagnosed in 2014.   He was started on velcade, revlimid and dexamethasone at diagnosis.   Of note, he developed a popliteal DVT and was started on Lovenox and later switched Xarelto.   He completed a stem cell transplant with Dr Schuster in May 2015. He is s/p  tandem autologous transplant in 2015 and has been on triple maintenance with bortezomib, lenalidomide and dexamethasone since then.   He also has peripheral neuropathy, h/o LE DVT on chronic anti-coagulation, GERD, vertebral fracture. He has history of DVT and remains on chronic anticoagulation.  He has had multiple skeletal complications since his original diagnosis.  He had prolonged bout of diarrhea in January 2023. All stool studies were negative/ unremarkable.  He had COVID 19 infection in March 2023. He received paxlovid  MRI spine in March 2023 showed compression fracture of L1 with retropulsion of the posterior fragment as well as minimal compression fractures of the superior endplates of T8 and T11. MRI pelvis with facet arthropathy in the lower lumbar spine.  Hip joints exhibiting bilateral chondral thinning with labral fraying and osteophyte production.     Interval History: He is here for a follow up visit.     Review of patient's allergies indicates:  No Known Allergies        Current Outpatient Medications   Medication Sig    acyclovir (ZOVIRAX) 400 MG tablet Take 1 tablet (400 mg total) by mouth 2 (two) times daily.    bortezomib (VELCADE  INJ) Inject as directed. Pt gets every month    calcium carb/vit D3/minerals (CALCIUM-VITAMIN D ORAL)     dexAMETHasone (DECADRON) 4 MG Tab TAKE 10 TABLETS (40 MG DOSE) BY MOUTH DAY OF AND DAY AFTER VELCADE once monthly    erythromycin (ROMYCIN) ophthalmic ointment Place into the left eye 3 (three) times daily.    folic acid-vit B6-vit B12 2.5-25-2 mg (FOLBIC) 2.5-25-2 mg Tab Take 1 tablet by mouth once daily.    hydroCHLOROthiazide (HYDRODIURIL) 25 MG tablet Take 1 tablet (25 mg total) by mouth once daily.    Lactobacillus acidophilus (PROBIOTIC ORAL) Take by mouth. 24 billion CFU    lenalidomide (REVLIMID) 10 mg Cap Take 1 capsule by mouth As instructed (take on days 1-21 of a 28 day cycle) [facility] [id number] 1/8/24.    magnesium oxide 500 mg Tab once daily.    multivitamin with minerals (MULTI-VITAMIN W/MINERALS ORAL)     omeprazole (PRILOSEC) 20 MG capsule     rivaroxaban (XARELTO) 15 mg Tab Take 1 tablet (15 mg total) by mouth once daily.    sulfamethoxazole-trimethoprim 800-160mg (BACTRIM DS) 800-160 mg Tab TAKE 1 TABLET EVERY MONDAY, WEDNESDAY AND FRIDAY    zoledronic acid (ZOMETA IV) Inject into the vein. Pt gets every 3 months     No current facility-administered medications for this visit.      Review of Systems   Constitutional:  Positive for malaise/fatigue. Negative for chills, fever and weight loss.   HENT:  Negative for congestion, ear pain and sinus pain.    Eyes:  Negative for double vision, photophobia and pain.   Respiratory:  Negative for sputum production and stridor.    Cardiovascular:  Negative for chest pain, palpitations, orthopnea and claudication.   Gastrointestinal:  Negative for blood in stool, constipation, diarrhea, melena and nausea.   Genitourinary:  Negative for dysuria, frequency and urgency.   Musculoskeletal:  Negative for myalgias and neck pain.   Neurological:  Positive for tingling. Negative for dizziness, tremors and speech change.   Endo/Heme/Allergies:  Negative for  environmental allergies. Does not bruise/bleed easily.   Psychiatric/Behavioral:  Negative for substance abuse and suicidal ideas.         Vitals:    01/12/24 1509   BP: (!) 154/83   Pulse: 69   Resp: 16   Temp: 97.9 °F (36.6 °C)       PS: ECOG 1    Physical Exam  HENT:      Head: Normocephalic and atraumatic.      Mouth/Throat:      Pharynx: No posterior oropharyngeal erythema.   Eyes:      General: No scleral icterus.  Cardiovascular:      Rate and Rhythm: Normal rate and regular rhythm.   Pulmonary:      Effort: Pulmonary effort is normal. No respiratory distress.      Breath sounds: Normal breath sounds.   Abdominal:      General: There is no distension.      Palpations: There is no mass.      Tenderness: There is no abdominal tenderness.   Musculoskeletal:         General: No swelling.   Lymphadenopathy:      Cervical: No cervical adenopathy.   Neurological:      General: No focal deficit present.      Mental Status: He is alert and oriented to person, place, and time.      Cranial Nerves: No cranial nerve deficit.          Component      Latest Ref [facility] 1/12/2024   WBC      3.90 - 12.70 K/uL 3.75 (L)    RBC      4.60 - 6.20 M/uL 4.04 (L)    Hemoglobin      14.0 - 18.0 g/dL 13.2 (L)    Hematocrit      40.0 - 54.0 % 39.0 (L)    MCV      82 - 98 fL 97    MCH      27.0 - 31.0 pg 32.7 (H)    MCHC      32.0 - 36.0 g/dL 33.8    RDW      11.5 - 14.5 % 14.6 (H)    Platelet Count      150 - 450 K/uL 139 (L)    MPV      9.2 - 12.9 fL 9.3    Immature Granulocytes      0.0 - 0.5 % 0.3    Gran # (ANC)      1.8 - 7.7 K/uL 1.5 (L)    Immature Grans (Abs)      0.00 - 0.04 K/uL 0.01    Lymph #      1.0 - 4.8 K/uL 1.7    Mono #      0.3 - 1.0 K/uL 0.5    Eos #      0.0 - 0.5 K/uL 0.0    Baso #      0.00 - 0.20 K/uL 0.02    nRBC      0 /100 WBC 0    Gran %      38.0 - 73.0 % 41.1    Lymph %      18.0 - 48.0 % 45.3    Mono %      4.0 - 15.0 % 12.3    Eosinophil %      0.0 - 8.0 % 0.5    Basophil %      0.0 - 1.9 % 0.5     Differential Method Automated    Sodium      136 - 145 mmol/L 139    Potassium      3.5 - 5.1 mmol/L 3.6    Chloride      95 - 110 mmol/L 106    CO2      23 - 29 mmol/L 27    Glucose      70 - 110 mg/dL 93    BUN      8 - 23 mg/dL 12    Creatinine      0.5 - 1.4 mg/dL 0.9    Calcium      8.7 - 10.5 mg/dL 9.1    PROTEIN TOTAL      6.0 - 8.4 g/dL 6.4    Albumin      3.5 - 5.2 g/dL 3.5    BILIRUBIN TOTAL      0.1 - 1.0 mg/dL 1.5 (H)    ALP      55 - 135 U/L 54 (L)    AST      10 - 40 U/L 26    ALT      10 - 44 U/L 26    eGFR      >60 mL/min/1.73 m^2 >60.0    Anion Gap      8 - 16 mmol/L 6 (L)    Cholesterol Total      120 - 199 mg/dL 198    Triglycerides      30 - 150 mg/dL 206 (H)    HDL      40 - 75 mg/dL 59    LDL Cholesterol      63.0 - 159.0 mg/dL 97.8    HDL/Cholesterol Ratio      20.0 - 50.0 % 29.8    Total Cholesterol/HDL Ratio      2.0 - 5.0  3.4    Non-HDL Cholesterol      mg/dL 139    IgG      650 - 1600 mg/dL 735    IgA      40 - 350 mg/dL 191    IgM      50 - 300 mg/dL 13 (L)    Magnesium       1.6 - 2.6 mg/dL 1.8    Phosphorus Level      2.7 - 4.5 mg/dL 3.1    Prostate Specific Antigen      0.00 - 4.00 ng/mL 2.9       Legend:  (L) Low  (H) High    1/9/2021 MRI Pelvis    IMPRESSION:   DIFFUSE PELVIC, PROXIMAL FEMORAL AND LOWER LUMBAR HETEROGENEOUS MARROW SIGNAL ABNORMALITY COULD RELATE TO HEMATOPOIETIC RED MARROW RECONVERSION. INFILTRATIVE MYELOMA CANNOT BE EXCLUDED. THERE IS A MORE DISCRETE 1.2 CM ENHANCING LESION IN THE LEFT FEMORAL HEAD THAT COULD ALSO REPRESENT AN ISLAND OF HEMATOPOIETIC RED MARROW. HOWEVER, CONTINUED ATTENTION ON FOLLOW-UP IS RECOMMENDED.    1/9/2021 MRI Lumbar spine/THoracic/Cervical  IMPRESSION:     1. There is interval increase in T2/STIR hyperintensity with enhancement extending from the left pedicle into the articular pillar and left transverse process of the T1 vertebra, although there is no definite decrease in the intrinsic T1 hypointense signal in this location. This may  also be a degenerative process, although focal myeloma is not definitely excluded. Clinical correlation is recommended, and follow-up is suggested on future examinations.      2. There has been interval increase in STIR hyperintensity and enhancement along the posterior endplates at the T1-T2 level, most likely progressive degenerative endplate marrow signal changes.     3. No other focal suspicious STIR hyperintense enhancing signal abnormalities are identified.     4. Stable multilevel compression fracture deformities      11/25/2020 Bone survey    IMPRESSION:   1. No lytic or blastic lesions are noted.      2. Stable compression fractures, as above.     3. Chronic changes, as above.       10/19/22 SPEP: Normal total protein, normal pattern.   10/19/22 sIFE: No monoclonal peaks identified.       3/2/23 SPEP: Normal total protein, normal pattern  3/2/23 sIFE: No monoclonal peaks identified.         3/9/23 MRI spine    FINDINGS:  Cervical spine:     Alignment: Normal.     Vertebrae: No fracture or marrow replacement process.     Discs: Multilevel mild disc height loss and desiccation.     Cord: Normal cord signal.     Craniocervical junction: Unremarkable.     C1-C2: Unremarkable.     Degenerative findings:     C2-C3: Small posterior disc osteophyte complex and bilateral facet arthropathy resulting in mild bilateral neural foraminal narrowing.  No significant spinal canal stenosis.     C3-C4: Posterior disc osteophyte complex and bilateral facet arthropathy resulting in moderate left and mild right neural foraminal narrowing.  Mild effacement of the anterior thecal sac without significant spinal canal stenosis.     C4-C5: Posterior disc osteophyte complex and bilateral facet arthropathy resulting in moderate predominantly left neural foraminal narrowing.  Effacement of the anterior thecal sac consistent with mild spinal canal stenosis.     C5-C6: Posterior disc osteophyte complex and bilateral uncovertebral spurring  resulting in moderate bilateral neural foraminal narrowing.  Effacement of the anterior thecal sac consistent with mild spinal canal stenosis.     C6-C7: Posterior disc osteophyte complex and bilateral uncovertebral spurring findings result in moderate bilateral neural foraminal narrowing.  Effacement of the anterior thecal sac consistent with mild spinal canal stenosis.     C7-T1: No spinal canal stenosis or neural foraminal narrowing.     Paraspinal muscles & soft tissues: Normal.     Enhancement: No abnormal areas of enhancement.     Thoracic Spine:     Alignment: Normal.     Vertebrae: No infiltrative marrow replacing process.  T2 hyperintense lesions consistent with probable hemangiomas of the anterior vertebral body of T4 and posterior and mid vertebral body of T7.  Mild height loss of the superior endplate of T8 and T11.     Discs: Multilevel mild disc height loss and desiccation.     Cord: Normal contour and signal.     Degenerative findings: No significant spinal canal stenosis or neural foraminal narrowing.     Soft tissue: Unremarkable.     Enhancement: No abnormal areas of enhancement.     Lumbar spine:     Alignment: Normal.     Vertebrae: No infiltrative marrow replacing process.  Compression fracture of L1 with retropulsion of posterior fragment approximately 0.6 cm which abuts the conus medullaris without causing deformity or signal abnormality.     Discs: Normal height and signal.     Cord: Multilevel disc height loss and desiccation most pronounced at T12-L1, L1-L2, and L4-L5.     Degenerative findings:     T12-L1: Central disc protrusion.  No spinal canal stenosis or neural foraminal narrowing.     L1-L2: Circumferential disc bulge resulting in near complete effacement of the bilateral perineural fat, right greater than left, consistent with moderate bilateral neural foraminal narrowing.  No significant spinal canal stenosis.     L2-L3: Circumferential disc bulge, bilateral facet arthropathy, and  ligamentum flavum buckling findings result in partial effacement of the right perineural fat consistent with moderate right neural foraminal narrowing.  There is lateral effacement of the thecal sac and crowding of the cauda equina nerves with minimal residual CSF, consistent with moderate spinal canal stenosis.     L3-L4: Circumferential disc bulge, bilateral facet arthropathy, and ligamentum flavum buckling.  No spinal canal stenosis or neural foraminal narrowing.     L4-L5: Circumferential disc bulge, bilateral facet arthropathy, and ligamentum flavum buckling, findings result in minimal effacement of the perineural fat, consistent with mild bilateral neural foraminal narrowing.  Mild effacement of the right lateral thecal sac.     L5-S1: Bilateral facet arthropathy.  No spinal canal stenosis or neural foraminal narrowing.     Upper SI joints/sacrum: Unremarkable.     Soft tissue: Unremarkable.     Enhancement: No abnormal areas of enhancement.     Impression:     1. No findings concerning for multiple myeloma lesion.  2. Compression fracture of L1 with retropulsion of the posterior fragment.  3. Additional minimal compression fractures of the superior endplates of T8 and T11.  4. Degenerative changes of the cervical, thoracic, and lumbar spine, as detailed above.    3/20/23 MRI pelvis      FINDINGS:  No masses or fluid collections. No pelvic ascites or lymphadenopathy.     Visualized bowel loops are unremarkable.  Urinary bladder is decompressed, noting circumferential wall thickening.  Prostate demonstrates heterogeneous signal, likely BPH.     Regional muscles and tendons are unremarkable.     Bone marrow signal is maintained. No fracture, focal lesion or marrow infiltrative process.     Note made of facet arthropathy in the lower lumbar spine.  Hip joints exhibit bilateral chondral thinning with labral fraying and osteophyte production.     Postoperative changes suggesting prior bilateral herniorrhaphy.      Impression:     1. No focal marrow lesion or diffuse infiltrative process.  2. Additional findings detailed above.    3/31/23 SPEP: Normal total protein, normal pattern.   3/31/23 sIFE: No monoclonal peaks identified.       9/5/23 SPEP:  Normal total protein, normal pattern.   9/5/23 sIFE : No monoclonal peaks identified.       12/4/23 SPEP: Decreased total protein. Decreased gamma globulins.  No paraprotein bands identified.       Component      Latest Ref Rng 12/4/2023   [place] Free Light Chains      0.33 - 1.94 mg/dL 1.73    [place] Free Light Chains      0.57 - 2.63 mg/dL 0.97    Kappa/Lambda FLC Ratio      0.26 - 1.65  1.78 (H)       Legend:  (H) High      Component      Latest Ref Rng 1/12/2024   WBC      3.90 - 12.70 K/uL 3.75 (L)    RBC      4.60 - 6.20 M/uL 4.04 (L)    Hemoglobin      14.0 - 18.0 g/dL 13.2 (L)    Hematocrit      40.0 - 54.0 % 39.0 (L)    MCV      82 - 98 fL 97    MCH      27.0 - 31.0 pg 32.7 (H)    MCHC      32.0 - 36.0 g/dL 33.8    RDW      11.5 - 14.5 % 14.6 (H)    Platelet Count      150 - 450 K/uL 139 (L)    MPV      9.2 - 12.9 fL 9.3    Immature Granulocytes      0.0 - 0.5 % 0.3    Gran # (ANC)      1.8 - 7.7 K/uL 1.5 (L)    Immature Grans (Abs)      0.00 - 0.04 K/uL 0.01    Lymph #      1.0 - 4.8 K/uL 1.7    Mono #      0.3 - 1.0 K/uL 0.5    Eos #      0.0 - 0.5 K/uL 0.0    Baso #      0.00 - 0.20 K/uL 0.02    nRBC      0 /100 WBC 0    Gran %      38.0 - 73.0 % 41.1    Lymph %      18.0 - 48.0 % 45.3    Mono %      4.0 - 15.0 % 12.3    Eosinophil %      0.0 - 8.0 % 0.5    Basophil %      0.0 - 1.9 % 0.5    Differential Method Automated    Sodium      136 - 145 mmol/L 139    Potassium      3.5 - 5.1 mmol/L 3.6    Chloride      95 - 110 mmol/L 106    CO2      23 - 29 mmol/L 27    Glucose      70 - 110 mg/dL 93    BUN      8 - 23 mg/dL 12    Creatinine      0.5 - 1.4 mg/dL 0.9    Calcium      8.7 - 10.5 mg/dL 9.1    PROTEIN TOTAL      6.0 - 8.4 g/dL 6.4    Albumin      3.5 - 5.2 g/dL 3.5     BILIRUBIN TOTAL      0.1 - 1.0 mg/dL 1.5 (H)    ALP      55 - 135 U/L 54 (L)    AST      10 - 40 U/L 26    ALT      10 - 44 U/L 26    eGFR      >60 mL/min/1.73 m^2 >60.0    Anion Gap      8 - 16 mmol/L 6 (L)    Cholesterol Total      120 - 199 mg/dL 198    Triglycerides      30 - 150 mg/dL 206 (H)    HDL      40 - 75 mg/dL 59    LDL Cholesterol      63.0 - 159.0 mg/dL 97.8    HDL/Cholesterol Ratio      20.0 - 50.0 % 29.8    Total Cholesterol/HDL Ratio      2.0 - 5.0  3.4    Non-HDL Cholesterol      mg/dL 139    IgG      650 - 1600 mg/dL 735    IgA      40 - 350 mg/dL 191    IgM      50 - 300 mg/dL 13 (L)    Magnesium       1.6 - 2.6 mg/dL 1.8    Phosphorus Level      2.7 - 4.5 mg/dL 3.1    Prostate Specific Antigen      0.00 - 4.00 ng/mL 2.9       Legend:  (L) Low  (H) High      Assessment:    1. IgA kappa multiple myeloma in remission     -Vicente Connors is a 63 y.o.-year-old male with history of stage IIIA, ISS II IgA kappa multiple myeloma, which is likely intermediate-risk based upon the presence of t(4;14) with hyperdiploidy   -S/ p  tandem auto stem cell transplant in 2015 with a good biochemical remission  -Now on triple maintenance: velcade 1.3 mg/m2 sub q monthly, dex 40 mg PO day of and day after monthly velcade , revlimid 10 mg  PO for 21 days on / 7 days off every 28 days  --Repeat 24 hour urine completed 4/2022  -No monoclonal protein on SPEP/sIFE done on 9/5/23 ; sFLC ratio 2.03, minimally elevated  --He has been getting MRI spine every 2 years  -No lytic or enhancing lesions identified on MRI whole spine done on 3/9/23  -MRI pelvis on 3/20/23 also negative for enhancing/ lytic lesion  -No monoclonal protein on SPEP done on 12/4/23; sFLC ratio 1.78      2. Normocytic anemia    --grade 1, Hgb 13.2 g/dl today  -likely secondary to chemotherapy    3. Leukopenia    --Likely secondary to chemotherapy      4. Absolute neutropenia    --Likely secondary to chemotherapy  -Afebrile; ANC 1.5 today    5.  Thrombocytopenia    -Mild,asymptomatic  -Platelet count 139 k today        6. Low Back Pain: chronic and unchanged; not on any analgesic at this time    --Spine MRI done 1/21/21, result detailed above under imaging    7. History of LE DVT:     --Continue Xarelto   --Take with largest meal of the day  --Notify office of any scheduled procedure/surgery as Xarelto will need to be interrupted   -no bleeding diathesis reported    8. Infectious disease      --Continue prophylactic bactrim and acyclovir   --recommend annual flu vaccine, COVID 19 booster      9. Bone health:     --MRI spine1/21 neg   --Repeat MRI spine and pelvis in March 2023 did not demonstrate any new lesions  --Last Zometa given 7/5/23, q3 months, will repeat    10. Diarrhea    --now improved  -stool studies neagtive in Jan 2023    11. Health Maintenance:     --Colonoscopy 2011 and 2017, repeat 2022   --PSA  up to date   --covid vaccine 3/31 pfizer booster done  --Got second booster 2/17/22    -recommend COVID booster           BMT Chart Routing      Follow up with physician 3 months. follow up 3/12   Follow up with SIG[facility]    Provider visit type    Infusion scheduling note   zometa today and 4/9 here   Injection scheduling note chemo here today, 2/14 and 3/12 at [facility], 4/9 here   Labs CBC, CMP, SPEP, free light chains, immunofixation and immunoglobulins   Scheduling:  Preferred lab:  Lab interval:  cbc, cmp on 2/14; cbc, cmp 3/12 at [facility]; cbc, cmp, spep, iommunofixation, light chains, igg, igm, iga , phos on 4/8 or 4/9   Imaging    Pharmacy appointment    Other referrals

## 2024-01-12 NOTE — TELEPHONE ENCOUNTER
Spoke to [facility] rep; high dollar auth  24; provided rep with correct fax number.

## 2024-01-16 ENCOUNTER — PATIENT MESSAGE (OUTPATIENT)
Dept: HEMATOLOGY/ONCOLOGY | Facility: CLINIC | Age: 64
End: 2024-01-16
Payer: COMMERCIAL

## 2024-01-16 DIAGNOSIS — C90.01 MULTIPLE MYELOMA IN REMISSION: ICD-10-CM

## 2024-01-16 LAB
ALBUMIN SERPL ELPH-MCNC: 3.71 G/DL (ref 3.35–5.55)
ALPHA1 GLOB SERPL ELPH-MCNC: 0.27 G/DL (ref 0.17–0.41)
ALPHA2 GLOB SERPL ELPH-MCNC: 0.6 G/DL (ref 0.43–0.99)
B-GLOBULIN SERPL ELPH-MCNC: 0.81 G/DL (ref 0.5–1.1)
GAMMA GLOB SERPL ELPH-MCNC: 0.71 G/DL (ref 0.67–1.58)
KAPPA LC SER QL IA: 1.59 MG/DL (ref 0.33–1.94)
KAPPA LC/LAMBDA SER IA: 1.31 (ref 0.26–1.65)
LAMBDA LC SER QL IA: 1.21 MG/DL (ref 0.57–2.63)
PROT SERPL-MCNC: 6.1 G/DL (ref 6–8.4)

## 2024-01-17 ENCOUNTER — TELEPHONE (OUTPATIENT)
Dept: HEMATOLOGY/ONCOLOGY | Facility: CLINIC | Age: 64
End: 2024-01-17
Payer: COMMERCIAL

## 2024-01-17 LAB — PATHOLOGIST INTERPRETATION SPE: NORMAL

## 2024-01-17 NOTE — TELEPHONE ENCOUNTER
"----- Message from Mae Fernando sent at 1/17/2024  3:38 PM CST -----  Regarding: Pt advice  Contact: Pt    Pt requesting call back in regards to form faxed over for the following meds lenalidomide (REVLIMID) 10 mg Cap. Pt stated insurance company needs the paperwork in order for him to get meds. Pt stated it has diana 15 days now.  Please call and adv @       Confirmed contact below:   Contact Name: Vicente Connors  Phone Number: [phone]               Additional Notes:  "Thank you for all that you do for our patients"                                           "

## 2024-01-17 NOTE — TELEPHONE ENCOUNTER
Paperwork received today through patient portal via pt. Will complete and have submitted to [employer] as soon as possible.

## 2024-01-18 ENCOUNTER — TELEPHONE (OUTPATIENT)
Dept: HEMATOLOGY/ONCOLOGY | Facility: CLINIC | Age: 64
End: 2024-01-18
Payer: COMMERCIAL

## 2024-01-18 NOTE — TELEPHONE ENCOUNTER
----- Message from Shima Barajas sent at 1/18/2024 12:34 PM CST -----  Regarding: Authorization  Contact: Emi             Name of Pharmacy:        [address] [address]  Phone: [phone] Fax: [phone]      Name Of caller:  Emi [phone]     Name of Medication:  lenalidomide (REVLIMID) 10 mg Cap   CoverMyMeds Key # [id number]    Comments/advisory:  Requesting additional prior authorization for quality limit

## 2024-01-19 RX ORDER — LENALIDOMIDE 10 MG/1
1 CAPSULE ORAL SEE ADMIN INSTRUCTIONS
Qty: 21 EACH | Refills: 0 | Status: SHIPPED | OUTPATIENT
Start: 2024-01-19 | End: 2024-01-25 | Stop reason: SDUPTHER

## 2024-01-22 ENCOUNTER — TELEPHONE (OUTPATIENT)
Dept: HEMATOLOGY/ONCOLOGY | Facility: CLINIC | Age: 64
End: 2024-01-22
Payer: COMMERCIAL

## 2024-01-22 ENCOUNTER — PATIENT MESSAGE (OUTPATIENT)
Dept: HEMATOLOGY/ONCOLOGY | Facility: CLINIC | Age: 64
End: 2024-01-22
Payer: COMMERCIAL

## 2024-01-22 ENCOUNTER — TELEPHONE (OUTPATIENT)
Dept: INFUSION THERAPY | Facility: OTHER | Age: 64
End: 2024-01-22
Payer: COMMERCIAL

## 2024-01-22 NOTE — TELEPHONE ENCOUNTER
"----- Message from Mae Fernando sent at 1/22/2024 11:37 AM CST -----  Regarding: Pt advice  Contact: [facility] calling to get PA for the following meds lenalidomide (REVLIMID) 10 mg Cap. Cover my keys [id number]  Please call and adv @      Confirmed contact below:   Contact Name:[facility]  Phone Number:[phone]               Additional Notes:  "Thank you for all that you do for our patients"                                           "

## 2024-01-22 NOTE — TELEPHONE ENCOUNTER
Returned pt's call.  Informed pt that his scheduled appointments for injections were requested by Dr Burt.  He plans to message MD to clarify plan.      ----- Message from Lito Morales sent at 1/19/2024  3:38 PM CST -----   Name of Who is Calling:     What is the request in detail:  patient request  call back in reference to scheduled appointments please contact patient to discuss scheduling  date and time/ patient scheduling has to be 28 days in order for insurance to cover  Please contact to further discuss and advise      Can the clinic reply by [facility]:     What Number to Call Back if not in [facility]:   [phone]

## 2024-01-24 DIAGNOSIS — C90.01 MULTIPLE MYELOMA IN REMISSION: ICD-10-CM

## 2024-01-24 NOTE — TELEPHONE ENCOUNTER
"----- Message from Anna Walter MA sent at 1/22/2024  1:52 PM CST -----    ----- Message -----  From: Mae Fernando  Sent: 1/22/2024  11:39 AM CST  To: [facility] Staff    Regarding: Pt advice  Contact: [facility]     [facility] calling to get PA for the following meds lenalidomide (REVLIMID) 10 mg Cap. Cover my keys [id number]  Please call and adv @      Confirmed contact below:   Contact Name:[facility]  Phone Number:[phone]               Additional Notes:  "Thank you for all that you do for our patients"                                             "

## 2024-01-25 DIAGNOSIS — C90.01 MULTIPLE MYELOMA IN REMISSION: ICD-10-CM

## 2024-01-25 RX ORDER — LENALIDOMIDE 10 MG/1
1 CAPSULE ORAL SEE ADMIN INSTRUCTIONS
Qty: 21 EACH | Refills: 0 | Status: SHIPPED | OUTPATIENT
Start: 2024-01-25 | End: 2024-01-26 | Stop reason: SDUPTHER

## 2024-01-25 NOTE — TELEPHONE ENCOUNTER
"----- Message from Mae Fernando sent at 1/25/2024  9:44 AM CST -----  Regarding: Pt advice  Contact: Pt     Pt requesting call back in regards to getting update on status of PA for the following lenalidomide (REVLIMID) 10 mg Cap  Please call and adv @       Confirmed contact below:   Contact Name: Vicente Connors  Phone Number: [phone]               Additional Notes:  "Thank you for all that you do for our patients"                                           "

## 2024-01-26 ENCOUNTER — TELEPHONE (OUTPATIENT)
Dept: HEMATOLOGY/ONCOLOGY | Facility: CLINIC | Age: 64
End: 2024-01-26
Payer: COMMERCIAL

## 2024-01-26 RX ORDER — LENALIDOMIDE 10 MG/1
10 CAPSULE ORAL DAILY
COMMUNITY
End: 2024-01-26 | Stop reason: SDUPTHER

## 2024-01-26 RX ORDER — LENALIDOMIDE 10 MG/1
10 CAPSULE ORAL DAILY
Qty: 21 EACH | Refills: 0 | Status: SHIPPED | OUTPATIENT
Start: 2024-01-26 | End: 2024-01-29

## 2024-01-26 NOTE — TELEPHONE ENCOUNTER
----- Message from Shima Barajas sent at 1/26/2024  1:59 PM CST -----  Regarding: Patient advice  Contact: Pt  [phone]              Name of Caller: Vicente     Contact Preference:  [phone]    Nature of Call:  Calling to check on the status of an pre authorization form for chemo drug REVLIMID     show

## 2024-01-26 NOTE — TELEPHONE ENCOUNTER
----- Message from Shima Barajas sent at 1/26/2024  1:59 PM CST -----  Regarding: Patient advice  Contact: Pt  [phone]              Name of Caller: Vicente     Contact Preference:  [phone]    Nature of Call:  Calling to check on the status of an pre authorization form for chemo drug REVLIMID

## 2024-01-29 ENCOUNTER — PATIENT MESSAGE (OUTPATIENT)
Dept: HEMATOLOGY/ONCOLOGY | Facility: CLINIC | Age: 64
End: 2024-01-29
Payer: COMMERCIAL

## 2024-01-29 DIAGNOSIS — C90.01 MULTIPLE MYELOMA IN REMISSION: ICD-10-CM

## 2024-01-29 RX ORDER — LENALIDOMIDE 10 MG/1
10 CAPSULE ORAL DAILY
Qty: 21 EACH | Refills: 0 | Status: SHIPPED | OUTPATIENT
Start: 2024-01-29 | End: 2024-02-28

## 2024-01-29 NOTE — TELEPHONE ENCOUNTER
----- Message from Shima Barajas sent at 1/29/2024  9:38 AM CST -----  Regarding: Refill  Contact: Pt  [phone]          Name of Pharmacy:     [facility] - [facility] [address] [facility]  [address] [facility]  [address] [place] [id number]  Phone: [phone] Fax: [phone]       Name Of caller:  Vicente     Name of Medication: REVLIMID 10 mg Cap, Is completely out of medication     Comments/advisory:   Requesting pre authorization would like to discuss states he has been trying to resolve issue for over two weeks want to know quickest way to receive medication

## 2024-01-29 NOTE — TELEPHONE ENCOUNTER
After furthering investigating with insurance, supervisor advised pt that [facility] is unable to approve name brand Revlimid without having trialed generic lenalidomide. Pt voiced understand. Dr. Burt to submit prescription for generic lenalidomide to [facility] pharmacy. Pt was appreciative of call.

## 2024-01-29 NOTE — TELEPHONE ENCOUNTER
----- Message from Elena Saba sent at 1/29/2024  9:24 AM CST -----  Regarding: Questions and concerns  Contact: [phone]  Type:  Needs Medical Advice    Who Called: Dago Zepeda  Would the patient rather a call back or a response via [facility]? Call  Best Call Back Number:  [phone]   Additional Information:  patients plan of care

## 2024-01-31 NOTE — TELEPHONE ENCOUNTER
Spoke to [facility] who spoke to pt and set up for delivery of lenolidomide for Friday. Notified pharmacy that pt was requesting brand name only and asked if pt aware that his prescription set for delivery on Friday was for generic. Representative said yes pt is aware and was informed when delivery was scheduled. Dr. Burt made aware of this on 1/31. Informed Dr. Burt that will write a sticky on pts chart for next refill to send off for BRAND NAME ONLY Revlimid instead of linalidomide.

## 2024-01-31 NOTE — TELEPHONE ENCOUNTER
F/u was made by Reed Oseguera RN on 1/29/24. Pt was instructed at that time to let office know if he has anymore difficulty attaining revlimid.

## 2024-02-14 ENCOUNTER — LAB VISIT (OUTPATIENT)
Dept: LAB | Facility: OTHER | Age: 64
End: 2024-02-14
Attending: INTERNAL MEDICINE
Payer: COMMERCIAL

## 2024-02-14 ENCOUNTER — INFUSION (OUTPATIENT)
Dept: INFUSION THERAPY | Facility: OTHER | Age: 64
End: 2024-02-14
Attending: INTERNAL MEDICINE
Payer: COMMERCIAL

## 2024-02-14 VITALS
SYSTOLIC BLOOD PRESSURE: 143 MMHG | RESPIRATION RATE: 16 BRPM | WEIGHT: 185.44 LBS | DIASTOLIC BLOOD PRESSURE: 75 MMHG | HEIGHT: 68 IN | HEART RATE: 69 BPM | OXYGEN SATURATION: 99 % | BODY MASS INDEX: 28.1 KG/M2

## 2024-02-14 DIAGNOSIS — D61.818 PANCYTOPENIA: ICD-10-CM

## 2024-02-14 DIAGNOSIS — C90.01 MULTIPLE MYELOMA IN REMISSION: Primary | ICD-10-CM

## 2024-02-14 DIAGNOSIS — G62.9 PERIPHERAL POLYNEUROPATHY: ICD-10-CM

## 2024-02-14 DIAGNOSIS — C90.01 MULTIPLE MYELOMA IN REMISSION: ICD-10-CM

## 2024-02-14 LAB
ALBUMIN SERPL BCP-MCNC: 3.5 G/DL (ref 3.5–5.2)
ALP SERPL-CCNC: 48 U/L (ref 55–135)
ALT SERPL W/O P-5'-P-CCNC: 29 U/L (ref 10–44)
ANION GAP SERPL CALC-SCNC: 10 MMOL/L (ref 8–16)
AST SERPL-CCNC: 30 U/L (ref 10–40)
BASOPHILS # BLD AUTO: 0.04 K/UL (ref 0–0.2)
BASOPHILS NFR BLD: 1.3 % (ref 0–1.9)
BILIRUB SERPL-MCNC: 1.2 MG/DL (ref 0.1–1)
BUN SERPL-MCNC: 18 MG/DL (ref 8–23)
CALCIUM SERPL-MCNC: 9.4 MG/DL (ref 8.7–10.5)
CHLORIDE SERPL-SCNC: 106 MMOL/L (ref 95–110)
CO2 SERPL-SCNC: 24 MMOL/L (ref 23–29)
CREAT SERPL-MCNC: 1 MG/DL (ref 0.5–1.4)
DIFFERENTIAL METHOD BLD: ABNORMAL
EOSINOPHIL # BLD AUTO: 0.1 K/UL (ref 0–0.5)
EOSINOPHIL NFR BLD: 1.6 % (ref 0–8)
ERYTHROCYTE [DISTWIDTH] IN BLOOD BY AUTOMATED COUNT: 14.8 % (ref 11.5–14.5)
EST. GFR  (NO RACE VARIABLE): >60 ML/MIN/1.73 M^2
GLUCOSE SERPL-MCNC: 108 MG/DL (ref 70–110)
HCT VFR BLD AUTO: 39.7 % (ref 40–54)
HGB BLD-MCNC: 13 G/DL (ref 14–18)
IGA SERPL-MCNC: 187 MG/DL (ref 40–350)
IGG SERPL-MCNC: 703 MG/DL (ref 650–1600)
IGM SERPL-MCNC: 11 MG/DL (ref 50–300)
IMM GRANULOCYTES # BLD AUTO: 0.01 K/UL (ref 0–0.04)
IMM GRANULOCYTES NFR BLD AUTO: 0.3 % (ref 0–0.5)
LYMPHOCYTES # BLD AUTO: 1.2 K/UL (ref 1–4.8)
LYMPHOCYTES NFR BLD: 40 % (ref 18–48)
MCH RBC QN AUTO: 31.5 PG (ref 27–31)
MCHC RBC AUTO-ENTMCNC: 32.7 G/DL (ref 32–36)
MCV RBC AUTO: 96 FL (ref 82–98)
MONOCYTES # BLD AUTO: 0.4 K/UL (ref 0.3–1)
MONOCYTES NFR BLD: 14.2 % (ref 4–15)
NEUTROPHILS # BLD AUTO: 1.3 K/UL (ref 1.8–7.7)
NEUTROPHILS NFR BLD: 42.6 % (ref 38–73)
NRBC BLD-RTO: 0 /100 WBC
PHOSPHATE SERPL-MCNC: 3.4 MG/DL (ref 2.7–4.5)
PLATELET # BLD AUTO: 164 K/UL (ref 150–450)
PMV BLD AUTO: 9.4 FL (ref 9.2–12.9)
POTASSIUM SERPL-SCNC: 3.9 MMOL/L (ref 3.5–5.1)
PROT SERPL-MCNC: 6.4 G/DL (ref 6–8.4)
RBC # BLD AUTO: 4.13 M/UL (ref 4.6–6.2)
SODIUM SERPL-SCNC: 140 MMOL/L (ref 136–145)
WBC # BLD AUTO: 3.1 K/UL (ref 3.9–12.7)

## 2024-02-14 PROCEDURE — 80053 COMPREHEN METABOLIC PANEL: CPT | Performed by: INTERNAL MEDICINE

## 2024-02-14 PROCEDURE — 63600175 PHARM REV CODE 636 W HCPCS: Mod: JZ,JG | Performed by: INTERNAL MEDICINE

## 2024-02-14 PROCEDURE — 82784 ASSAY IGA/IGD/IGG/IGM EACH: CPT | Mod: 59 | Performed by: INTERNAL MEDICINE

## 2024-02-14 PROCEDURE — 96401 CHEMO ANTI-NEOPL SQ/IM: CPT

## 2024-02-14 PROCEDURE — 84165 PROTEIN E-PHORESIS SERUM: CPT | Performed by: INTERNAL MEDICINE

## 2024-02-14 PROCEDURE — 84165 PROTEIN E-PHORESIS SERUM: CPT | Mod: 26,,, | Performed by: PATHOLOGY

## 2024-02-14 PROCEDURE — 83521 IG LIGHT CHAINS FREE EACH: CPT | Performed by: INTERNAL MEDICINE

## 2024-02-14 PROCEDURE — 36415 COLL VENOUS BLD VENIPUNCTURE: CPT | Performed by: INTERNAL MEDICINE

## 2024-02-14 PROCEDURE — 86334 IMMUNOFIX E-PHORESIS SERUM: CPT | Performed by: INTERNAL MEDICINE

## 2024-02-14 PROCEDURE — 86334 IMMUNOFIX E-PHORESIS SERUM: CPT | Mod: 26,,, | Performed by: PATHOLOGY

## 2024-02-14 PROCEDURE — 85025 COMPLETE CBC W/AUTO DIFF WBC: CPT | Performed by: INTERNAL MEDICINE

## 2024-02-14 PROCEDURE — 84100 ASSAY OF PHOSPHORUS: CPT | Performed by: INTERNAL MEDICINE

## 2024-02-14 RX ORDER — HEPARIN 100 UNIT/ML
500 SYRINGE INTRAVENOUS
Status: DISCONTINUED | OUTPATIENT
Start: 2024-02-14 | End: 2024-02-14 | Stop reason: HOSPADM

## 2024-02-14 RX ORDER — LENALIDOMIDE 10 MG/1
1 CAPSULE ORAL SEE ADMIN INSTRUCTIONS
Qty: 21 EACH | Refills: 0 | Status: SHIPPED | OUTPATIENT
Start: 2024-02-14 | End: 2024-02-28

## 2024-02-14 RX ORDER — BORTEZOMIB 3.5 MG/1
1.3 INJECTION, POWDER, LYOPHILIZED, FOR SOLUTION INTRAVENOUS; SUBCUTANEOUS
Status: COMPLETED | OUTPATIENT
Start: 2024-02-14 | End: 2024-02-14

## 2024-02-14 RX ORDER — SODIUM CHLORIDE 0.9 % (FLUSH) 0.9 %
10 SYRINGE (ML) INJECTION
Status: DISCONTINUED | OUTPATIENT
Start: 2024-02-14 | End: 2024-02-14 | Stop reason: HOSPADM

## 2024-02-14 RX ADMIN — BORTEZOMIB 2.5 MG: 3.5 INJECTION, POWDER, LYOPHILIZED, FOR SOLUTION INTRAVENOUS; SUBCUTANEOUS at 08:02

## 2024-02-14 NOTE — PLAN OF CARE
Problem: Adult Inpatient Plan of Care  Goal: Plan of Care Review  Outcome: Ongoing, Progressing  Goal: Patient-Specific Goal (Individualized)  Outcome: Ongoing, Progressing     Problem: Nausea and Vomiting (Chemotherapy Effects)  Goal: Fluid and Electrolyte Balance  Outcome: Ongoing, Progressing         Patient arrived to clinic today for SQ velcade injection. VS and assessment completed with no acute issues noted. Injection given in abdominal tissue and band aid applied. All questions answered, scheduled for next appt on 3-15-24 and left clinic ambulatory in NAD.

## 2024-02-15 ENCOUNTER — TELEPHONE (OUTPATIENT)
Dept: INFUSION THERAPY | Facility: HOSPITAL | Age: 64
End: 2024-02-15
Payer: COMMERCIAL

## 2024-02-15 ENCOUNTER — PATIENT MESSAGE (OUTPATIENT)
Dept: HEMATOLOGY/ONCOLOGY | Facility: CLINIC | Age: 64
End: 2024-02-15
Payer: COMMERCIAL

## 2024-02-15 ENCOUNTER — TELEPHONE (OUTPATIENT)
Dept: HEMATOLOGY/ONCOLOGY | Facility: CLINIC | Age: 64
End: 2024-02-15
Payer: COMMERCIAL

## 2024-02-15 LAB
ALBUMIN SERPL ELPH-MCNC: 3.64 G/DL (ref 3.35–5.55)
ALPHA1 GLOB SERPL ELPH-MCNC: 0.27 G/DL (ref 0.17–0.41)
ALPHA2 GLOB SERPL ELPH-MCNC: 0.59 G/DL (ref 0.43–0.99)
B-GLOBULIN SERPL ELPH-MCNC: 0.76 G/DL (ref 0.5–1.1)
GAMMA GLOB SERPL ELPH-MCNC: 0.64 G/DL (ref 0.67–1.58)
INTERPRETATION SERPL IFE-IMP: NORMAL
KAPPA LC SER QL IA: 1.65 MG/DL (ref 0.33–1.94)
KAPPA LC/LAMBDA SER IA: 1.56 (ref 0.26–1.65)
LAMBDA LC SER QL IA: 1.06 MG/DL (ref 0.57–2.63)
PATHOLOGIST INTERPRETATION IFE: NORMAL
PATHOLOGIST INTERPRETATION SPE: NORMAL
PROT SERPL-MCNC: 5.9 G/DL (ref 6–8.4)

## 2024-02-15 NOTE — TELEPHONE ENCOUNTER
----- Message from Ifeanyi Iqbal sent at 2/15/2024 12:34 PM CST -----  Pt is requesting a call back asap regarding scheduling a f/u appt on 4/15/24 or later.    Pt can be reached at  [phone].     Thanks

## 2024-02-15 NOTE — TELEPHONE ENCOUNTER
Spoke to pt and informed him that our scheduling team will reach out to have 4/9 infusion and lab appointments rescheduled.

## 2024-02-19 DIAGNOSIS — C90.01 MULTIPLE MYELOMA IN REMISSION: ICD-10-CM

## 2024-02-19 DIAGNOSIS — Z86.718 HISTORY OF DVT (DEEP VEIN THROMBOSIS): ICD-10-CM

## 2024-02-22 DIAGNOSIS — C90.01 MULTIPLE MYELOMA IN REMISSION: Primary | ICD-10-CM

## 2024-02-22 RX ORDER — LENALIDOMIDE 10 MG/1
CAPSULE ORAL
Qty: 21 EACH | Refills: 0 | Status: SHIPPED | OUTPATIENT
Start: 2024-02-22 | End: 2024-02-25 | Stop reason: SDUPTHER

## 2024-02-25 DIAGNOSIS — C90.01 MULTIPLE MYELOMA IN REMISSION: ICD-10-CM

## 2024-02-26 RX ORDER — LENALIDOMIDE 10 MG/1
CAPSULE ORAL
Qty: 21 EACH | Refills: 0 | Status: SHIPPED | OUTPATIENT
Start: 2024-02-26 | End: 2024-02-28

## 2024-02-28 ENCOUNTER — TELEPHONE (OUTPATIENT)
Dept: HEMATOLOGY/ONCOLOGY | Facility: CLINIC | Age: 64
End: 2024-02-28
Payer: COMMERCIAL

## 2024-02-28 DIAGNOSIS — C90.01 MULTIPLE MYELOMA IN REMISSION: Primary | ICD-10-CM

## 2024-02-28 RX ORDER — LENALIDOMIDE 10 MG/1
10 CAPSULE ORAL DAILY
Qty: 21 EACH | Refills: 0 | Status: SHIPPED | OUTPATIENT
Start: 2024-02-28 | End: 2024-03-18 | Stop reason: SDUPTHER

## 2024-02-28 NOTE — TELEPHONE ENCOUNTER
"----- Message from Toby Benítez sent at 2/28/2024  4:50 PM CST -----  Consult/Advisory:        Name Of Caller: Self      Contact Preference?: [phone]       Provider Name: Carmel      Does patient feel the need to be seen today? No      What is the nature of the call?: Calling to inform office that his pharmacy needs celgene auth for lenalidomide 10 mg Cap refill.    [address] [address]  Phone: [phone] Fax: [phone]      Additional Notes:  "Thank you for all that you do for our patients"       "

## 2024-02-29 ENCOUNTER — TELEPHONE (OUTPATIENT)
Dept: HEMATOLOGY/ONCOLOGY | Facility: CLINIC | Age: 64
End: 2024-02-29
Payer: COMMERCIAL

## 2024-02-29 NOTE — TELEPHONE ENCOUNTER
Spoke with Adina at [facility] pharmacy and verified that they did receive pt prescription and it is currently in process to be filled. She stated that no other tasks need to be completed by provider's office or patient.

## 2024-02-29 NOTE — TELEPHONE ENCOUNTER
Spoke with Patient and advised that I spoke with [facility] and they stated that his fill for Lenalidomide is currently in process. [facility] advised that there is nothing currently that is needed from pt or provider's office. Pt verbalized understanding and agreement. Pt stated that he will be taking up name brand issue with insurance in the meantime but happy to have refill.

## 2024-03-15 ENCOUNTER — INFUSION (OUTPATIENT)
Dept: INFUSION THERAPY | Facility: OTHER | Age: 64
End: 2024-03-15
Attending: INTERNAL MEDICINE
Payer: COMMERCIAL

## 2024-03-15 ENCOUNTER — LAB VISIT (OUTPATIENT)
Dept: LAB | Facility: OTHER | Age: 64
End: 2024-03-15
Attending: INTERNAL MEDICINE
Payer: COMMERCIAL

## 2024-03-15 VITALS
OXYGEN SATURATION: 99 % | WEIGHT: 184.5 LBS | BODY MASS INDEX: 27.96 KG/M2 | RESPIRATION RATE: 16 BRPM | DIASTOLIC BLOOD PRESSURE: 73 MMHG | HEART RATE: 63 BPM | SYSTOLIC BLOOD PRESSURE: 144 MMHG | HEIGHT: 68 IN

## 2024-03-15 DIAGNOSIS — C90.01 MULTIPLE MYELOMA IN REMISSION: Primary | ICD-10-CM

## 2024-03-15 DIAGNOSIS — C90.01 MULTIPLE MYELOMA IN REMISSION: ICD-10-CM

## 2024-03-15 DIAGNOSIS — G62.9 PERIPHERAL POLYNEUROPATHY: ICD-10-CM

## 2024-03-15 DIAGNOSIS — Z94.81 AUTOLOGOUS BONE MARROW TRANSPLANTATION STATUS: ICD-10-CM

## 2024-03-15 LAB
ALBUMIN SERPL BCP-MCNC: 3.7 G/DL (ref 3.5–5.2)
ALP SERPL-CCNC: 64 U/L (ref 55–135)
ALT SERPL W/O P-5'-P-CCNC: 27 U/L (ref 10–44)
ANION GAP SERPL CALC-SCNC: 10 MMOL/L (ref 8–16)
AST SERPL-CCNC: 24 U/L (ref 10–40)
BASOPHILS # BLD AUTO: 0.02 K/UL (ref 0–0.2)
BASOPHILS NFR BLD: 0.5 % (ref 0–1.9)
BILIRUB SERPL-MCNC: 1.2 MG/DL (ref 0.1–1)
BUN SERPL-MCNC: 16 MG/DL (ref 8–23)
CALCIUM SERPL-MCNC: 9 MG/DL (ref 8.7–10.5)
CHLORIDE SERPL-SCNC: 104 MMOL/L (ref 95–110)
CO2 SERPL-SCNC: 25 MMOL/L (ref 23–29)
CREAT SERPL-MCNC: 0.9 MG/DL (ref 0.5–1.4)
DIFFERENTIAL METHOD BLD: ABNORMAL
EOSINOPHIL # BLD AUTO: 0.1 K/UL (ref 0–0.5)
EOSINOPHIL NFR BLD: 1.3 % (ref 0–8)
ERYTHROCYTE [DISTWIDTH] IN BLOOD BY AUTOMATED COUNT: 14.6 % (ref 11.5–14.5)
EST. GFR  (NO RACE VARIABLE): >60 ML/MIN/1.73 M^2
GLUCOSE SERPL-MCNC: 92 MG/DL (ref 70–110)
HCT VFR BLD AUTO: 39.7 % (ref 40–54)
HGB BLD-MCNC: 13 G/DL (ref 14–18)
IGA SERPL-MCNC: 170 MG/DL (ref 40–350)
IGG SERPL-MCNC: 639 MG/DL (ref 650–1600)
IGM SERPL-MCNC: 13 MG/DL (ref 50–300)
IMM GRANULOCYTES # BLD AUTO: 0.02 K/UL (ref 0–0.04)
IMM GRANULOCYTES NFR BLD AUTO: 0.5 % (ref 0–0.5)
LYMPHOCYTES # BLD AUTO: 1.3 K/UL (ref 1–4.8)
LYMPHOCYTES NFR BLD: 33 % (ref 18–48)
MAGNESIUM SERPL-MCNC: 1.9 MG/DL (ref 1.6–2.6)
MCH RBC QN AUTO: 31.3 PG (ref 27–31)
MCHC RBC AUTO-ENTMCNC: 32.7 G/DL (ref 32–36)
MCV RBC AUTO: 95 FL (ref 82–98)
MONOCYTES # BLD AUTO: 0.4 K/UL (ref 0.3–1)
MONOCYTES NFR BLD: 9.5 % (ref 4–15)
NEUTROPHILS # BLD AUTO: 2.2 K/UL (ref 1.8–7.7)
NEUTROPHILS NFR BLD: 55.2 % (ref 38–73)
NRBC BLD-RTO: 0 /100 WBC
PHOSPHATE SERPL-MCNC: 3.5 MG/DL (ref 2.7–4.5)
PLATELET # BLD AUTO: 167 K/UL (ref 150–450)
PMV BLD AUTO: 9.6 FL (ref 9.2–12.9)
POTASSIUM SERPL-SCNC: 3.4 MMOL/L (ref 3.5–5.1)
PROT SERPL-MCNC: 6.1 G/DL (ref 6–8.4)
RBC # BLD AUTO: 4.16 M/UL (ref 4.6–6.2)
SODIUM SERPL-SCNC: 139 MMOL/L (ref 136–145)
WBC # BLD AUTO: 3.91 K/UL (ref 3.9–12.7)

## 2024-03-15 PROCEDURE — 84100 ASSAY OF PHOSPHORUS: CPT | Performed by: NURSE PRACTITIONER

## 2024-03-15 PROCEDURE — 80053 COMPREHEN METABOLIC PANEL: CPT | Performed by: INTERNAL MEDICINE

## 2024-03-15 PROCEDURE — 84165 PROTEIN E-PHORESIS SERUM: CPT | Mod: 26,,, | Performed by: PATHOLOGY

## 2024-03-15 PROCEDURE — 85025 COMPLETE CBC W/AUTO DIFF WBC: CPT | Performed by: INTERNAL MEDICINE

## 2024-03-15 PROCEDURE — 63600175 PHARM REV CODE 636 W HCPCS: Mod: JZ,JG | Performed by: INTERNAL MEDICINE

## 2024-03-15 PROCEDURE — 82784 ASSAY IGA/IGD/IGG/IGM EACH: CPT | Mod: 59 | Performed by: INTERNAL MEDICINE

## 2024-03-15 PROCEDURE — 96401 CHEMO ANTI-NEOPL SQ/IM: CPT

## 2024-03-15 PROCEDURE — 83521 IG LIGHT CHAINS FREE EACH: CPT | Mod: 59 | Performed by: INTERNAL MEDICINE

## 2024-03-15 PROCEDURE — 36415 COLL VENOUS BLD VENIPUNCTURE: CPT | Performed by: INTERNAL MEDICINE

## 2024-03-15 PROCEDURE — 83735 ASSAY OF MAGNESIUM: CPT | Performed by: NURSE PRACTITIONER

## 2024-03-15 PROCEDURE — 84165 PROTEIN E-PHORESIS SERUM: CPT | Performed by: INTERNAL MEDICINE

## 2024-03-15 RX ORDER — HEPARIN 100 UNIT/ML
500 SYRINGE INTRAVENOUS
Status: DISCONTINUED | OUTPATIENT
Start: 2024-03-15 | End: 2024-03-15 | Stop reason: HOSPADM

## 2024-03-15 RX ORDER — LENALIDOMIDE 10 MG/1
1 CAPSULE ORAL SEE ADMIN INSTRUCTIONS
Qty: 21 EACH | Refills: 0 | Status: SHIPPED | OUTPATIENT
Start: 2024-03-15 | End: 2024-04-17 | Stop reason: SDUPTHER

## 2024-03-15 RX ORDER — BORTEZOMIB 3.5 MG/1
1.3 INJECTION, POWDER, LYOPHILIZED, FOR SOLUTION INTRAVENOUS; SUBCUTANEOUS
Status: COMPLETED | OUTPATIENT
Start: 2024-03-15 | End: 2024-03-15

## 2024-03-15 RX ORDER — SODIUM CHLORIDE 0.9 % (FLUSH) 0.9 %
10 SYRINGE (ML) INJECTION
Status: DISCONTINUED | OUTPATIENT
Start: 2024-03-15 | End: 2024-03-15 | Stop reason: HOSPADM

## 2024-03-15 RX ADMIN — BORTEZOMIB 2.5 MG: 3.5 INJECTION, POWDER, LYOPHILIZED, FOR SOLUTION INTRAVENOUS; SUBCUTANEOUS at 10:03

## 2024-03-15 NOTE — PLAN OF CARE
Vital Signs Stable, No apparent distress noted; Pt tolerated _Velcade w/o difficulty.  No bleeding,swelling or drainage noted to the site.  AVS/Discharge instructions given;all questions answered ;Pt verbalizes understanding. Next appointments scheduled and sent via mychart; ambulated from clinic in NAD

## 2024-03-18 DIAGNOSIS — C90.01 MULTIPLE MYELOMA IN REMISSION: ICD-10-CM

## 2024-03-18 LAB
ALBUMIN SERPL ELPH-MCNC: 3.58 G/DL (ref 3.35–5.55)
ALPHA1 GLOB SERPL ELPH-MCNC: 0.28 G/DL (ref 0.17–0.41)
ALPHA2 GLOB SERPL ELPH-MCNC: 0.63 G/DL (ref 0.43–0.99)
B-GLOBULIN SERPL ELPH-MCNC: 0.79 G/DL (ref 0.5–1.1)
GAMMA GLOB SERPL ELPH-MCNC: 0.63 G/DL (ref 0.67–1.58)
KAPPA LC SER QL IA: 1.54 MG/DL (ref 0.33–1.94)
KAPPA LC/LAMBDA SER IA: 1.4 (ref 0.26–1.65)
LAMBDA LC SER QL IA: 1.1 MG/DL (ref 0.57–2.63)
PROT SERPL-MCNC: 5.9 G/DL (ref 6–8.4)

## 2024-03-18 RX ORDER — LENALIDOMIDE 10 MG/1
10 CAPSULE ORAL DAILY
Qty: 21 EACH | Refills: 0 | Status: SHIPPED | OUTPATIENT
Start: 2024-03-18 | End: 2024-03-21 | Stop reason: SDUPTHER

## 2024-03-19 LAB — PATHOLOGIST INTERPRETATION SPE: NORMAL

## 2024-03-21 ENCOUNTER — PATIENT MESSAGE (OUTPATIENT)
Dept: HEMATOLOGY/ONCOLOGY | Facility: CLINIC | Age: 64
End: 2024-03-21
Payer: COMMERCIAL

## 2024-03-21 ENCOUNTER — TELEPHONE (OUTPATIENT)
Dept: HEMATOLOGY/ONCOLOGY | Facility: CLINIC | Age: 64
End: 2024-03-21
Payer: COMMERCIAL

## 2024-03-21 DIAGNOSIS — C90.01 MULTIPLE MYELOMA IN REMISSION: ICD-10-CM

## 2024-03-21 NOTE — TELEPHONE ENCOUNTER
"----- Message from Toby Benítez sent at 3/21/2024  2:56 PM CDT -----  Consult/Advisory:          Name Of Caller:  JULIETH Foster        Contact Preference?:  [phone]     Ref # [id number]     Fax: [phone]      Provider Name: [facility]      Does patient feel the need to be seen today? No      What is the nature of the call?: Requesting LIA for lenalidomide (REVLIMID) 10 mg Cap          Additional Notes:  "Thank you for all that you do for our patients"      "

## 2024-03-22 RX ORDER — LENALIDOMIDE 10 MG/1
10 CAPSULE ORAL DAILY
Qty: 21 EACH | Refills: 0 | Status: SHIPPED | OUTPATIENT
Start: 2024-03-22 | End: 2024-04-15 | Stop reason: SDUPTHER

## 2024-04-15 ENCOUNTER — INFUSION (OUTPATIENT)
Dept: INFUSION THERAPY | Facility: HOSPITAL | Age: 64
End: 2024-04-15
Attending: INTERNAL MEDICINE
Payer: COMMERCIAL

## 2024-04-15 ENCOUNTER — LAB VISIT (OUTPATIENT)
Dept: LAB | Facility: HOSPITAL | Age: 64
End: 2024-04-15
Attending: INTERNAL MEDICINE
Payer: COMMERCIAL

## 2024-04-15 ENCOUNTER — OFFICE VISIT (OUTPATIENT)
Dept: HEMATOLOGY/ONCOLOGY | Facility: CLINIC | Age: 64
End: 2024-04-15
Payer: COMMERCIAL

## 2024-04-15 VITALS
HEART RATE: 66 BPM | DIASTOLIC BLOOD PRESSURE: 70 MMHG | OXYGEN SATURATION: 99 % | SYSTOLIC BLOOD PRESSURE: 128 MMHG | WEIGHT: 178.88 LBS | HEIGHT: 68 IN | RESPIRATION RATE: 18 BRPM | BODY MASS INDEX: 27.11 KG/M2 | TEMPERATURE: 98 F

## 2024-04-15 VITALS
WEIGHT: 178.88 LBS | HEIGHT: 68 IN | BODY MASS INDEX: 27.11 KG/M2 | TEMPERATURE: 98 F | DIASTOLIC BLOOD PRESSURE: 79 MMHG | OXYGEN SATURATION: 95 % | SYSTOLIC BLOOD PRESSURE: 128 MMHG | HEART RATE: 70 BPM | RESPIRATION RATE: 18 BRPM

## 2024-04-15 DIAGNOSIS — G62.9 PERIPHERAL POLYNEUROPATHY: ICD-10-CM

## 2024-04-15 DIAGNOSIS — C90.01 MULTIPLE MYELOMA IN REMISSION: ICD-10-CM

## 2024-04-15 DIAGNOSIS — K11.7 XEROSTOMIA: Primary | ICD-10-CM

## 2024-04-15 DIAGNOSIS — Z94.81 AUTOLOGOUS BONE MARROW TRANSPLANTATION STATUS: ICD-10-CM

## 2024-04-15 DIAGNOSIS — C90.01 MULTIPLE MYELOMA IN REMISSION: Primary | ICD-10-CM

## 2024-04-15 DIAGNOSIS — C90.00 IGA MYELOMA: ICD-10-CM

## 2024-04-15 DIAGNOSIS — Z86.718 HISTORY OF DVT (DEEP VEIN THROMBOSIS): ICD-10-CM

## 2024-04-15 DIAGNOSIS — D80.9 IMMUNOGLOBULIN DEFICIENCY: ICD-10-CM

## 2024-04-15 LAB
ALBUMIN SERPL BCP-MCNC: 3.5 G/DL (ref 3.5–5.2)
ALP SERPL-CCNC: 62 U/L (ref 55–135)
ALT SERPL W/O P-5'-P-CCNC: 31 U/L (ref 10–44)
ANION GAP SERPL CALC-SCNC: 9 MMOL/L (ref 8–16)
AST SERPL-CCNC: 30 U/L (ref 10–40)
BASOPHILS # BLD AUTO: 0.02 K/UL (ref 0–0.2)
BASOPHILS NFR BLD: 0.5 % (ref 0–1.9)
BILIRUB SERPL-MCNC: 1.3 MG/DL (ref 0.1–1)
BUN SERPL-MCNC: 13 MG/DL (ref 8–23)
CALCIUM SERPL-MCNC: 8.8 MG/DL (ref 8.7–10.5)
CHLORIDE SERPL-SCNC: 102 MMOL/L (ref 95–110)
CO2 SERPL-SCNC: 29 MMOL/L (ref 23–29)
CREAT SERPL-MCNC: 0.9 MG/DL (ref 0.5–1.4)
DIFFERENTIAL METHOD BLD: ABNORMAL
EOSINOPHIL # BLD AUTO: 0 K/UL (ref 0–0.5)
EOSINOPHIL NFR BLD: 0.8 % (ref 0–8)
ERYTHROCYTE [DISTWIDTH] IN BLOOD BY AUTOMATED COUNT: 15.6 % (ref 11.5–14.5)
EST. GFR  (NO RACE VARIABLE): >60 ML/MIN/1.73 M^2
GLUCOSE SERPL-MCNC: 100 MG/DL (ref 70–110)
HCT VFR BLD AUTO: 39 % (ref 40–54)
HGB BLD-MCNC: 13.1 G/DL (ref 14–18)
IGA SERPL-MCNC: 173 MG/DL (ref 40–350)
IGG SERPL-MCNC: 676 MG/DL (ref 650–1600)
IGM SERPL-MCNC: 13 MG/DL (ref 50–300)
IMM GRANULOCYTES # BLD AUTO: 0.01 K/UL (ref 0–0.04)
IMM GRANULOCYTES NFR BLD AUTO: 0.3 % (ref 0–0.5)
LYMPHOCYTES # BLD AUTO: 1.4 K/UL (ref 1–4.8)
LYMPHOCYTES NFR BLD: 37 % (ref 18–48)
MCH RBC QN AUTO: 31.9 PG (ref 27–31)
MCHC RBC AUTO-ENTMCNC: 33.6 G/DL (ref 32–36)
MCV RBC AUTO: 95 FL (ref 82–98)
MONOCYTES # BLD AUTO: 0.5 K/UL (ref 0.3–1)
MONOCYTES NFR BLD: 14 % (ref 4–15)
NEUTROPHILS # BLD AUTO: 1.8 K/UL (ref 1.8–7.7)
NEUTROPHILS NFR BLD: 47.4 % (ref 38–73)
NRBC BLD-RTO: 0 /100 WBC
PHOSPHATE SERPL-MCNC: 3.2 MG/DL (ref 2.7–4.5)
PLATELET # BLD AUTO: 156 K/UL (ref 150–450)
PMV BLD AUTO: 9.7 FL (ref 9.2–12.9)
POTASSIUM SERPL-SCNC: 3.3 MMOL/L (ref 3.5–5.1)
PROT SERPL-MCNC: 6.1 G/DL (ref 6–8.4)
RBC # BLD AUTO: 4.11 M/UL (ref 4.6–6.2)
SODIUM SERPL-SCNC: 140 MMOL/L (ref 136–145)
WBC # BLD AUTO: 3.78 K/UL (ref 3.9–12.7)

## 2024-04-15 PROCEDURE — 96374 THER/PROPH/DIAG INJ IV PUSH: CPT

## 2024-04-15 PROCEDURE — 84165 PROTEIN E-PHORESIS SERUM: CPT | Performed by: INTERNAL MEDICINE

## 2024-04-15 PROCEDURE — 1159F MED LIST DOCD IN RCRD: CPT | Mod: CPTII,S$GLB,, | Performed by: INTERNAL MEDICINE

## 2024-04-15 PROCEDURE — 99999 PR PBB SHADOW E&M-EST. PATIENT-LVL IV: CPT | Mod: PBBFAC,,, | Performed by: INTERNAL MEDICINE

## 2024-04-15 PROCEDURE — 3074F SYST BP LT 130 MM HG: CPT | Mod: CPTII,S$GLB,, | Performed by: INTERNAL MEDICINE

## 2024-04-15 PROCEDURE — 85025 COMPLETE CBC W/AUTO DIFF WBC: CPT | Performed by: INTERNAL MEDICINE

## 2024-04-15 PROCEDURE — 96401 CHEMO ANTI-NEOPL SQ/IM: CPT

## 2024-04-15 PROCEDURE — 83521 IG LIGHT CHAINS FREE EACH: CPT | Mod: 59 | Performed by: INTERNAL MEDICINE

## 2024-04-15 PROCEDURE — 96375 TX/PRO/DX INJ NEW DRUG ADDON: CPT

## 2024-04-15 PROCEDURE — 25000003 PHARM REV CODE 250: Performed by: INTERNAL MEDICINE

## 2024-04-15 PROCEDURE — 3008F BODY MASS INDEX DOCD: CPT | Mod: CPTII,S$GLB,, | Performed by: INTERNAL MEDICINE

## 2024-04-15 PROCEDURE — 99214 OFFICE O/P EST MOD 30 MIN: CPT | Mod: S$GLB,,, | Performed by: INTERNAL MEDICINE

## 2024-04-15 PROCEDURE — 82784 ASSAY IGA/IGD/IGG/IGM EACH: CPT | Mod: 59 | Performed by: INTERNAL MEDICINE

## 2024-04-15 PROCEDURE — 3078F DIAST BP <80 MM HG: CPT | Mod: CPTII,S$GLB,, | Performed by: INTERNAL MEDICINE

## 2024-04-15 PROCEDURE — 84165 PROTEIN E-PHORESIS SERUM: CPT | Mod: 26,,, | Performed by: PATHOLOGY

## 2024-04-15 PROCEDURE — 80053 COMPREHEN METABOLIC PANEL: CPT | Performed by: INTERNAL MEDICINE

## 2024-04-15 PROCEDURE — 86334 IMMUNOFIX E-PHORESIS SERUM: CPT | Performed by: INTERNAL MEDICINE

## 2024-04-15 PROCEDURE — 36415 COLL VENOUS BLD VENIPUNCTURE: CPT | Performed by: INTERNAL MEDICINE

## 2024-04-15 PROCEDURE — 86334 IMMUNOFIX E-PHORESIS SERUM: CPT | Mod: 26,,, | Performed by: PATHOLOGY

## 2024-04-15 PROCEDURE — 84100 ASSAY OF PHOSPHORUS: CPT | Performed by: INTERNAL MEDICINE

## 2024-04-15 PROCEDURE — 63600175 PHARM REV CODE 636 W HCPCS: Mod: JW,JG | Performed by: INTERNAL MEDICINE

## 2024-04-15 RX ORDER — HEPARIN 100 UNIT/ML
500 SYRINGE INTRAVENOUS
Status: CANCELLED | OUTPATIENT
Start: 2024-04-15

## 2024-04-15 RX ORDER — ZOLEDRONIC ACID 0.04 MG/ML
4 INJECTION, SOLUTION INTRAVENOUS
Status: COMPLETED | OUTPATIENT
Start: 2024-04-15 | End: 2024-04-15

## 2024-04-15 RX ORDER — BORTEZOMIB 3.5 MG/1
1.3 INJECTION, POWDER, LYOPHILIZED, FOR SOLUTION INTRAVENOUS; SUBCUTANEOUS
Status: COMPLETED | OUTPATIENT
Start: 2024-04-15 | End: 2024-04-15

## 2024-04-15 RX ORDER — SODIUM CHLORIDE 0.9 % (FLUSH) 0.9 %
10 SYRINGE (ML) INJECTION
Status: CANCELLED | OUTPATIENT
Start: 2024-04-15

## 2024-04-15 RX ORDER — ZOLEDRONIC ACID 0.04 MG/ML
4 INJECTION, SOLUTION INTRAVENOUS
Status: CANCELLED | OUTPATIENT
Start: 2024-04-15

## 2024-04-15 RX ORDER — BORTEZOMIB 3.5 MG/1
1.3 INJECTION, POWDER, LYOPHILIZED, FOR SOLUTION INTRAVENOUS; SUBCUTANEOUS
Status: CANCELLED | OUTPATIENT
Start: 2024-04-15

## 2024-04-15 RX ORDER — SODIUM CHLORIDE 0.9 % (FLUSH) 0.9 %
10 SYRINGE (ML) INJECTION
Status: DISCONTINUED | OUTPATIENT
Start: 2024-04-15 | End: 2024-04-15 | Stop reason: HOSPADM

## 2024-04-15 RX ORDER — LENALIDOMIDE 10 MG/1
10 CAPSULE ORAL DAILY
Qty: 21 EACH | Refills: 0 | Status: SHIPPED | OUTPATIENT
Start: 2024-04-15 | End: 2024-04-19 | Stop reason: SDUPTHER

## 2024-04-15 RX ORDER — HEPARIN 100 UNIT/ML
500 SYRINGE INTRAVENOUS
Status: DISCONTINUED | OUTPATIENT
Start: 2024-04-15 | End: 2024-04-15 | Stop reason: HOSPADM

## 2024-04-15 RX ORDER — LENALIDOMIDE 10 MG/1
1 CAPSULE ORAL SEE ADMIN INSTRUCTIONS
Qty: 21 EACH | Refills: 0 | Status: SHIPPED | OUTPATIENT
Start: 2024-04-15 | End: 2024-04-17 | Stop reason: SDUPTHER

## 2024-04-15 RX ADMIN — BORTEZOMIB 2.5 MG: 3.5 INJECTION, POWDER, LYOPHILIZED, FOR SOLUTION INTRAVENOUS; SUBCUTANEOUS at 02:04

## 2024-04-15 RX ADMIN — SODIUM CHLORIDE: 9 INJECTION, SOLUTION INTRAVENOUS at 02:04

## 2024-04-15 RX ADMIN — ZOLEDRONIC ACID 4 MG: 0.04 INJECTION, SOLUTION INTRAVENOUS at 02:04

## 2024-04-15 NOTE — PROGRESS NOTES
Chief Complaint : Multiple myeloma, s/p tandem auto SCT, on VRd maintenance, hematology follow up      History of Present Illness : Vicente Connors is a 63 y.o. male here for hematology follow up. He has recently moved to [place] from [place]. He has history of stage IIIA, [employer] stage II IgA kappa multiple myeloma, which is likely intermediate-risk based upon the presence of t(4;14) with hyperdiploidy, based on records available through care everywhere.    IgA Kappa multiple Myeloma was diagnosed in 2014.   He was started on velcade, revlimid and dexamethasone at diagnosis.   Of note, he developed a popliteal DVT and was started on Lovenox and later switched Xarelto.   He completed a stem cell transplant with Dr Schuster in May 2015. He is s/p  tandem autologous transplant in 2015 and has been on triple maintenance with bortezomib, lenalidomide and dexamethasone since then.   He also has peripheral neuropathy, h/o LE DVT on chronic anti-coagulation, GERD, vertebral fracture. He has history of DVT and remains on chronic anticoagulation.  He has had multiple skeletal complications since his original diagnosis.  He had prolonged bout of diarrhea in January 2023. All stool studies were negative/ unremarkable.  He had COVID 19 infection in March 2023. He received paxlovid  MRI spine in March 2023 showed compression fracture of L1 with retropulsion of the posterior fragment as well as minimal compression fractures of the superior endplates of T8 and T11. MRI pelvis with facet arthropathy in the lower lumbar spine.  Hip joints exhibiting bilateral chondral thinning with labral fraying and osteophyte production.     Interval History: He is here for a follow up visit. He is doing well. His back pain is chronic, stable.     Review of patient's allergies indicates:  No Known Allergies        Current Outpatient Medications   Medication Sig Dispense Refill    acyclovir (ZOVIRAX) 400 MG tablet Take 1 tablet  (400 mg total) by mouth 2 (two) times daily. 180 tablet 4    bortezomib (VELCADE INJ) Inject as directed. Pt gets every month      calcium carb/vit D3/minerals (CALCIUM-VITAMIN D ORAL)       dexAMETHasone (DECADRON) 4 MG Tab TAKE 10 TABLETS (40 MG DOSE) BY MOUTH DAY OF AND DAY AFTER VELCADE once monthly 80 tablet 11    erythromycin (ROMYCIN) ophthalmic ointment Place into the left eye 3 (three) times daily. 3.5 g 0    folic acid-vit B6-vit B12 2.5-25-2 mg (FOLBIC) 2.5-25-2 mg Tab Take 1 tablet by mouth once daily. 30 tablet 11    hydroCHLOROthiazide (HYDRODIURIL) 25 MG tablet Take 1 tablet (25 mg total) by mouth once daily. 90 tablet 11    Lactobacillus acidophilus (PROBIOTIC ORAL) Take by mouth. 24 billion CFU      lenalidomide (REVLIMID) 10 mg Cap Take 10 mg by mouth once daily On days 1-21 of a 28 day cycle. Auth #[id number] 3/21/24. 21 each 0    lenalidomide 10 mg Cap Take 1 capsule by mouth As instructed. 21 each 0    magnesium oxide 500 mg Tab once daily.      multivitamin with minerals (MULTI-VITAMIN W/MINERALS ORAL)       omeprazole (PRILOSEC) 20 MG capsule       rivaroxaban (XARELTO) 15 mg Tab Take 1 tablet (15 mg total) by mouth once daily. 90 tablet 4    sulfamethoxazole-trimethoprim 800-160mg (BACTRIM DS) 800-160 mg Tab TAKE 1 TABLET EVERY MONDAY, WEDNESDAY AND FRIDAY 36 tablet 3    zoledronic acid (ZOMETA IV) Inject into the vein. Pt gets every 3 months       No current facility-administered medications for this visit.      Review of Systems   Constitutional:  Positive for malaise/fatigue. Negative for chills, fever and weight loss.   HENT:  Negative for congestion, ear pain and sinus pain.    Eyes:  Negative for double vision, photophobia and pain.   Respiratory:  Negative for sputum production and stridor.    Cardiovascular:  Negative for chest pain, palpitations, orthopnea and claudication.   Gastrointestinal:  Negative for blood in stool, constipation, diarrhea, melena and nausea.   Genitourinary:   Negative for dysuria, frequency and urgency.   Musculoskeletal:  Negative for myalgias and neck pain.   Neurological:  Positive for tingling. Negative for dizziness, tremors and speech change.   Endo/Heme/Allergies:  Negative for environmental allergies. Does not bruise/bleed easily.   Psychiatric/Behavioral:  Negative for substance abuse and suicidal ideas.             Vitals:    04/15/24 1357   BP: 128/79   Pulse: 70   Resp: 18   Temp: 98 °F (36.7 °C)           PS: ECOG 1    Physical Exam  HENT:      Head: Normocephalic and atraumatic.      Mouth/Throat:      Pharynx: No posterior oropharyngeal erythema.   Eyes:      General: No scleral icterus.  Cardiovascular:      Rate and Rhythm: Normal rate and regular rhythm.   Pulmonary:      Effort: Pulmonary effort is normal. No respiratory distress.      Breath sounds: Normal breath sounds.   Abdominal:      General: There is no distension.      Palpations: There is no mass.      Tenderness: There is no abdominal tenderness.   Musculoskeletal:         General: No swelling.   Lymphadenopathy:      Cervical: No cervical adenopathy.   Neurological:      General: No focal deficit present.      Mental Status: He is alert and oriented to person, place, and time.      Cranial Nerves: No cranial nerve deficit.        Component      Latest Ref [facility] 4/15/2024   WBC      3.90 - 12.70 K/uL 3.78 (L)    RBC      4.60 - 6.20 M/uL 4.11 (L)    Hemoglobin      14.0 - 18.0 g/dL 13.1 (L)    Hematocrit      40.0 - 54.0 % 39.0 (L)    MCV      82 - 98 fL 95    MCH      27.0 - 31.0 pg 31.9 (H)    MCHC      32.0 - 36.0 g/dL 33.6    RDW      11.5 - 14.5 % 15.6 (H)    Platelet Count      150 - 450 K/uL 156    MPV      9.2 - 12.9 fL 9.7    Immature Granulocytes      0.0 - 0.5 % 0.3    Gran # (ANC)      1.8 - 7.7 K/uL 1.8    Immature Grans (Abs)      0.00 - 0.04 K/uL 0.01    Lymph #      1.0 - 4.8 K/uL 1.4    Mono #      0.3 - 1.0 K/uL 0.5    Eos #      0.0 - 0.5 K/uL 0.0    Baso #      0.00 - 0.20  K/uL 0.02    nRBC      0 /100 WBC 0    Gran %      38.0 - 73.0 % 47.4    Lymph %      18.0 - 48.0 % 37.0    Mono %      4.0 - 15.0 % 14.0    Eos %      0.0 - 8.0 % 0.8    Basophil %      0.0 - 1.9 % 0.5    Differential Method Automated    Sodium      136 - 145 mmol/L 140    Potassium      3.5 - 5.1 mmol/L 3.3 (L)    Chloride      95 - 110 mmol/L 102    CO2      23 - 29 mmol/L 29    Glucose      70 - 110 mg/dL 100    BUN      8 - 23 mg/dL 13    Creatinine      0.5 - 1.4 mg/dL 0.9    Calcium      8.7 - 10.5 mg/dL 8.8    PROTEIN TOTAL      6.0 - 8.4 g/dL 6.1    Albumin      3.5 - 5.2 g/dL 3.5    BILIRUBIN TOTAL      0.1 - 1.0 mg/dL 1.3 (H)    ALP      55 - 135 U/L 62    AST      10 - 40 U/L 30    ALT      10 - 44 U/L 31    eGFR      >60 mL/min/1.73 m^2 >60.0    Anion Gap      8 - 16 mmol/L 9    Phosphorus Level      2.7 - 4.5 mg/dL 3.2               1/9/2021 MRI Pelvis    IMPRESSION:   DIFFUSE PELVIC, PROXIMAL FEMORAL AND LOWER LUMBAR HETEROGENEOUS MARROW SIGNAL ABNORMALITY COULD RELATE TO HEMATOPOIETIC RED MARROW RECONVERSION. INFILTRATIVE MYELOMA CANNOT BE EXCLUDED. THERE IS A MORE DISCRETE 1.2 CM ENHANCING LESION IN THE LEFT FEMORAL HEAD THAT COULD ALSO REPRESENT AN ISLAND OF HEMATOPOIETIC RED MARROW. HOWEVER, CONTINUED ATTENTION ON FOLLOW-UP IS RECOMMENDED.    1/9/2021 MRI Lumbar spine/THoracic/Cervical  IMPRESSION:     1. There is interval increase in T2/STIR hyperintensity with enhancement extending from the left pedicle into the articular pillar and left transverse process of the T1 vertebra, although there is no definite decrease in the intrinsic T1 hypointense signal in this location. This may also be a degenerative process, although focal myeloma is not definitely excluded. Clinical correlation is recommended, and follow-up is suggested on future examinations.      2. There has been interval increase in STIR hyperintensity and enhancement along the posterior endplates at the T1-T2 level, most likely  progressive degenerative endplate marrow signal changes.     3. No other focal suspicious STIR hyperintense enhancing signal abnormalities are identified.     4. Stable multilevel compression fracture deformities      11/25/2020 Bone survey    IMPRESSION:   1. No lytic or blastic lesions are noted.      2. Stable compression fractures, as above.     3. Chronic changes, as above.       10/19/22 SPEP: Normal total protein, normal pattern.   10/19/22 sIFE: No monoclonal peaks identified.       3/2/23 SPEP: Normal total protein, normal pattern  3/2/23 sIFE: No monoclonal peaks identified.         3/9/23 MRI spine    FINDINGS:  Cervical spine:     Alignment: Normal.     Vertebrae: No fracture or marrow replacement process.     Discs: Multilevel mild disc height loss and desiccation.     Cord: Normal cord signal.     Craniocervical junction: Unremarkable.     C1-C2: Unremarkable.     Degenerative findings:     C2-C3: Small posterior disc osteophyte complex and bilateral facet arthropathy resulting in mild bilateral neural foraminal narrowing.  No significant spinal canal stenosis.     C3-C4: Posterior disc osteophyte complex and bilateral facet arthropathy resulting in moderate left and mild right neural foraminal narrowing.  Mild effacement of the anterior thecal sac without significant spinal canal stenosis.     C4-C5: Posterior disc osteophyte complex and bilateral facet arthropathy resulting in moderate predominantly left neural foraminal narrowing.  Effacement of the anterior thecal sac consistent with mild spinal canal stenosis.     C5-C6: Posterior disc osteophyte complex and bilateral uncovertebral spurring resulting in moderate bilateral neural foraminal narrowing.  Effacement of the anterior thecal sac consistent with mild spinal canal stenosis.     C6-C7: Posterior disc osteophyte complex and bilateral uncovertebral spurring findings result in moderate bilateral neural foraminal narrowing.  Effacement of the  anterior thecal sac consistent with mild spinal canal stenosis.     C7-T1: No spinal canal stenosis or neural foraminal narrowing.     Paraspinal muscles & soft tissues: Normal.     Enhancement: No abnormal areas of enhancement.     Thoracic Spine:     Alignment: Normal.     Vertebrae: No infiltrative marrow replacing process.  T2 hyperintense lesions consistent with probable hemangiomas of the anterior vertebral body of T4 and posterior and mid vertebral body of T7.  Mild height loss of the superior endplate of T8 and T11.     Discs: Multilevel mild disc height loss and desiccation.     Cord: Normal contour and signal.     Degenerative findings: No significant spinal canal stenosis or neural foraminal narrowing.     Soft tissue: Unremarkable.     Enhancement: No abnormal areas of enhancement.     Lumbar spine:     Alignment: Normal.     Vertebrae: No infiltrative marrow replacing process.  Compression fracture of L1 with retropulsion of posterior fragment approximately 0.6 cm which abuts the conus medullaris without causing deformity or signal abnormality.     Discs: Normal height and signal.     Cord: Multilevel disc height loss and desiccation most pronounced at T12-L1, L1-L2, and L4-L5.     Degenerative findings:     T12-L1: Central disc protrusion.  No spinal canal stenosis or neural foraminal narrowing.     L1-L2: Circumferential disc bulge resulting in near complete effacement of the bilateral perineural fat, right greater than left, consistent with moderate bilateral neural foraminal narrowing.  No significant spinal canal stenosis.     L2-L3: Circumferential disc bulge, bilateral facet arthropathy, and ligamentum flavum buckling findings result in partial effacement of the right perineural fat consistent with moderate right neural foraminal narrowing.  There is lateral effacement of the thecal sac and crowding of the cauda equina nerves with minimal residual CSF, consistent with moderate spinal canal  stenosis.     L3-L4: Circumferential disc bulge, bilateral facet arthropathy, and ligamentum flavum buckling.  No spinal canal stenosis or neural foraminal narrowing.     L4-L5: Circumferential disc bulge, bilateral facet arthropathy, and ligamentum flavum buckling, findings result in minimal effacement of the perineural fat, consistent with mild bilateral neural foraminal narrowing.  Mild effacement of the right lateral thecal sac.     L5-S1: Bilateral facet arthropathy.  No spinal canal stenosis or neural foraminal narrowing.     Upper SI joints/sacrum: Unremarkable.     Soft tissue: Unremarkable.     Enhancement: No abnormal areas of enhancement.     Impression:     1. No findings concerning for multiple myeloma lesion.  2. Compression fracture of L1 with retropulsion of the posterior fragment.  3. Additional minimal compression fractures of the superior endplates of T8 and T11.  4. Degenerative changes of the cervical, thoracic, and lumbar spine, as detailed above.    3/20/23 MRI pelvis      FINDINGS:  No masses or fluid collections. No pelvic ascites or lymphadenopathy.     Visualized bowel loops are unremarkable.  Urinary bladder is decompressed, noting circumferential wall thickening.  Prostate demonstrates heterogeneous signal, likely BPH.     Regional muscles and tendons are unremarkable.     Bone marrow signal is maintained. No fracture, focal lesion or marrow infiltrative process.     Note made of facet arthropathy in the lower lumbar spine.  Hip joints exhibit bilateral chondral thinning with labral fraying and osteophyte production.     Postoperative changes suggesting prior bilateral herniorrhaphy.     Impression:     1. No focal marrow lesion or diffuse infiltrative process.  2. Additional findings detailed above.    3/31/23 SPEP: Normal total protein, normal pattern.   3/31/23 sIFE: No monoclonal peaks identified.       9/5/23 SPEP:  Normal total protein, normal pattern.   9/5/23 sIFE : No monoclonal  peaks identified.       12/4/23 SPEP: Decreased total protein. Decreased gamma globulins.  No paraprotein bands identified.     3/15/24  SPEP: Decreased total protein. Decreased gamma globulins.  No paraprotein bands identified.       Component      Latest Ref Rng 3/15/2024   IgG      650 - 1600 mg/dL 639 (L)    IgA      40 - 350 mg/dL 170    IgM      50 - 300 mg/dL 13 (L)    Kappa Free Light Chains      0.33 - 1.94 mg/dL 1.54    Lambda Free Light Chains      0.57 - 2.63 mg/dL 1.10    Kappa/Lambda FLC Ratio      0.26 - 1.65  1.40       Component      Latest Ref Rng 4/15/2024   WBC      3.90 - 12.70 K/uL 3.78 (L)    RBC      4.60 - 6.20 M/uL 4.11 (L)    Hemoglobin      14.0 - 18.0 g/dL 13.1 (L)    Hematocrit      40.0 - 54.0 % 39.0 (L)    MCV      82 - 98 fL 95    MCH      27.0 - 31.0 pg 31.9 (H)    MCHC      32.0 - 36.0 g/dL 33.6    RDW      11.5 - 14.5 % 15.6 (H)    Platelet Count      150 - 450 K/uL 156    MPV      9.2 - 12.9 fL 9.7    Immature Granulocytes      0.0 - 0.5 % 0.3    Gran # (ANC)      1.8 - 7.7 K/uL 1.8    Immature Grans (Abs)      0.00 - 0.04 K/uL 0.01    Lymph #      1.0 - 4.8 K/uL 1.4    Mono #      0.3 - 1.0 K/uL 0.5    Eos #      0.0 - 0.5 K/uL 0.0    Baso #      0.00 - 0.20 K/uL 0.02    nRBC      0 /100 WBC 0    Gran %      38.0 - 73.0 % 47.4    Lymph %      18.0 - 48.0 % 37.0    Mono %      4.0 - 15.0 % 14.0    Eos %      0.0 - 8.0 % 0.8    Basophil %      0.0 - 1.9 % 0.5    Differential Method Automated    Sodium      136 - 145 mmol/L 140    Potassium      3.5 - 5.1 mmol/L 3.3 (L)    Chloride      95 - 110 mmol/L 102    CO2      23 - 29 mmol/L 29    Glucose      70 - 110 mg/dL 100    BUN      8 - 23 mg/dL 13    Creatinine      0.5 - 1.4 mg/dL 0.9    Calcium      8.7 - 10.5 mg/dL 8.8    PROTEIN TOTAL      6.0 - 8.4 g/dL 6.1    Albumin      3.5 - 5.2 g/dL 3.5    BILIRUBIN TOTAL      0.1 - 1.0 mg/dL 1.3 (H)    ALP      55 - 135 U/L 62    AST      10 - 40 U/L 30    ALT      10 - 44 U/L 31     eGFR      >60 mL/min/1.73 m^2 >60.0    Anion Gap      8 - 16 mmol/L 9    IgG      650 - 1600 mg/dL 676    IgA      40 - 350 mg/dL 173       Assessment:    1. IgA kappa multiple myeloma in remission     -Vicente Connors is a 63 y.o.-year-old male with history of stage IIIA, ISS II IgA kappa multiple myeloma, which is likely intermediate-risk based upon the presence of t(4;14) with hyperdiploidy   -S/ p  tandem auto stem cell transplant in 2015 with a good biochemical remission  -Now on triple maintenance: velcade 1.3 mg/m2 sub q monthly, dex 40 mg PO day of and day after monthly velcade , revlimid 10 mg  PO for 21 days on / 7 days off every 28 days  --Repeat 24 hour urine completed 4/2022  -No monoclonal protein on SPEP/sIFE done on 9/5/23 ; sFLC ratio 2.03, minimally elevated  --He has been getting MRI spine every 2 years  -No lytic or enhancing lesions identified on MRI whole spine done on 3/9/23  -MRI pelvis on 3/20/23 also negative for enhancing/ lytic lesion  -No monoclonal protein on SPEP done on 12/4/23; sFLC ratio 1.78      2. Normocytic anemia    --grade 1, Hgb 13.2 g/dl today  -likely secondary to chemotherapy    3. Leukopenia    --Likely secondary to chemotherapy      4. Absolute neutropenia    --Likely secondary to chemotherapy  -Afebrile; ANC 1.5 today    5. Thrombocytopenia    -Mild,asymptomatic  -Platelet count 139 k today        6. Low Back Pain: chronic and unchanged; not on any analgesic at this time    --Spine MRI done 1/21/21, result detailed above under imaging    7. History of LE DVT:     --Continue Xarelto   --Take with largest meal of the day  --Notify office of any scheduled procedure/surgery as Xarelto will need to be interrupted   -no bleeding diathesis reported    8. Infectious disease      --Continue prophylactic bactrim and acyclovir   --recommend annual flu vaccine, COVID 19 booster      9. Bone health:     --MRI spine1/21 neg   --Repeat MRI spine and pelvis in March 2023 did not  demonstrate any new lesions  --Last Zometa given 1/12/24, q3 months, will repeat    10. Diarrhea    --now improved  -stool studies neagtive in Jan 2023    11. Health Maintenance:     --Colonoscopy 2011 and 2017, repeat 2022   --PSA  up to date   --covid vaccine 3/31 pfizer booster done  --Got second booster 2/17/22    -recommend COVID booster           BMT Chart Routing      Follow up with physician    Follow up with [facility]    Provider visit type    Infusion scheduling note   zomet ahere today and in 12 weeks   Injection scheduling note velcade today here; yarely;jero in 4 weeks and in 8 weeks at [facility]; velcade her ein 12 weeks   Labs CBC, CMP, phosphorus, magnesium, SPEP, free light chains, immunoglobulins and immunofixation   Scheduling:  Preferred lab:  Lab interval:  cbc, cmp every 4 weeks at [facility]; cbc, cmp, spep,.igg, igm, iga , light chains, igg, igm, iga, mag, zahra sin 3 months   Imaging    Pharmacy appointment    Other referrals

## 2024-04-15 NOTE — PLAN OF CARE
Pt received Zometa & Velcade SQ today and tolerated well, without complications. VSS. Educated patient about Zometa & Velcade SQ (indications, side effects, possible reactions, precautions) and verbalized understanding. PIV positive for blood return, saline locked and removed prior to DC, catheter tip intact. Pt DC with no distress noted, ambulated off of unit, via self, w/o event, pleased.

## 2024-04-16 LAB
ALBUMIN SERPL ELPH-MCNC: 3.72 G/DL (ref 3.35–5.55)
ALPHA1 GLOB SERPL ELPH-MCNC: 0.27 G/DL (ref 0.17–0.41)
ALPHA2 GLOB SERPL ELPH-MCNC: 0.62 G/DL (ref 0.43–0.99)
B-GLOBULIN SERPL ELPH-MCNC: 0.85 G/DL (ref 0.5–1.1)
GAMMA GLOB SERPL ELPH-MCNC: 0.63 G/DL (ref 0.67–1.58)
INTERPRETATION SERPL IFE-IMP: NORMAL
KAPPA LC SER QL IA: 1.48 MG/DL (ref 0.33–1.94)
KAPPA LC/LAMBDA SER IA: 1.25 (ref 0.26–1.65)
LAMBDA LC SER QL IA: 1.18 MG/DL (ref 0.57–2.63)
PROT SERPL-MCNC: 6.1 G/DL (ref 6–8.4)

## 2024-04-17 ENCOUNTER — TELEPHONE (OUTPATIENT)
Dept: HEMATOLOGY/ONCOLOGY | Facility: CLINIC | Age: 64
End: 2024-04-17
Payer: COMMERCIAL

## 2024-04-17 ENCOUNTER — PATIENT MESSAGE (OUTPATIENT)
Dept: HEMATOLOGY/ONCOLOGY | Facility: CLINIC | Age: 64
End: 2024-04-17
Payer: COMMERCIAL

## 2024-04-17 DIAGNOSIS — C90.01 MULTIPLE MYELOMA IN REMISSION: ICD-10-CM

## 2024-04-17 LAB
PATHOLOGIST INTERPRETATION IFE: NORMAL
PATHOLOGIST INTERPRETATION SPE: NORMAL

## 2024-04-17 NOTE — TELEPHONE ENCOUNTER
----- Message from Jessie Zayas sent at 4/17/2024 10:54 AM CDT -----  Regarding: Rx refill  Contact: Vicente Khanna  RX Name:lenalidomide (REVLIMID) 10 mg Cap    How is it taken:Sig - Route: Take 10 mg by mouth once daily On days 1-21 of a 28 day cycle. Auth #[id number]    Quantity:       Preferred Pharmacy with phone number:      [address] [address]  Phone: [phone] Fax: [phone]         Ordering Provider: Carmel       Contact Preference: [phone] (home)      Addl info: Patient calling to notify that Rx needs a new auth number for this month. Patient states Pharmacy asking for a  call or electronically re-submit the Rx with new auth number for the lenalidomide (REVLIMID) 10 mg Cap

## 2024-04-17 NOTE — TELEPHONE ENCOUNTER
Called pt, no answer, left VM in regards to revlimid. Sent portal msg stating refill not due yet since last refill was on march 29th, the next refill not due until closer to April 29th. Also, rems provider survey not available as of yet. Notified pt that RN will f/u  and check to see when survey becomes available so that his refill can be sent off with the new auth number right away to [facility]. Awaiting pt reply.

## 2024-04-19 DIAGNOSIS — C90.01 MULTIPLE MYELOMA IN REMISSION: ICD-10-CM

## 2024-04-19 RX ORDER — LENALIDOMIDE 10 MG/1
10 CAPSULE ORAL DAILY
Qty: 21 EACH | Refills: 0 | Status: SHIPPED | OUTPATIENT
Start: 2024-04-19 | End: 2024-05-16 | Stop reason: SDUPTHER

## 2024-04-19 NOTE — TELEPHONE ENCOUNTER
"----- Message from Toby Jeyson sent at 4/19/2024  2:17 PM CDT -----  Consult/Advisory      Name Of Caller: .JULIETH Foster    Contact Preference?: [phone]    Provider Name: Carmel    Does patient feel the need to be seen today? No    What is the nature of the call?: Requesting [employer] auth for lenalidomide (REVLIMID) 10 mg Cap      Additional Notes: Call dropped before I could verify proper call back #    "Thank you for all that you do for our patients"  "

## 2024-05-06 DIAGNOSIS — C90.01 MULTIPLE MYELOMA IN REMISSION: ICD-10-CM

## 2024-05-06 RX ORDER — SULFAMETHOXAZOLE AND TRIMETHOPRIM 800; 160 MG/1; MG/1
TABLET ORAL
Qty: 36 TABLET | Refills: 3 | Status: SHIPPED | OUTPATIENT
Start: 2024-05-06

## 2024-05-06 NOTE — TELEPHONE ENCOUNTER
----- Message from Pauly Cheng sent at 5/6/2024  8:50 AM CDT -----  Regarding: rx refill  Contact: ELEAZAR DIEZ [[id number]]  RX Name:sulfamethoxazole-trimethoprim 800-160mg (BACTRIM DS) 800-160 mg Tab     How is it taken:Sig: TAKE 1 TABLET EVERY MONDAY, WEDNESDAY AND FRIDAY     Quantity:36  tablets       Preferred Pharmacy with phone number:    [id number] [place] [address] [facility] [place] [id number] [facility] [address]  Phone: [phone] Fax: [phone]               Ordering Provider:       Contact Preference:[phone] (home)       Addl info:Patient states he's done to 3 pills

## 2024-05-06 NOTE — TELEPHONE ENCOUNTER
Spoke with  and advised that a note for the infusion team was entered that pt needed to complete labs before his treatment. Pt advised that slot was not available for labs prior to infusion and to plan to leave early to get labs drawn prior to treatment. Pt verbalized agreement and understanding.

## 2024-05-06 NOTE — TELEPHONE ENCOUNTER
----- Message from Pauly Cheng sent at 5/6/2024  8:53 AM CDT -----  Regarding: Consult/Advisory  Contact: ELEAZAR CONNORS [[id number]]     Consult/Advisory     Name Of Caller:Eleazar Connors         Contact Preference:[phone] (home)       Nature of call:Patient is calling about his appts on the 05/13 that are schedule wrong. Requesting a   call back . Labs first then infusion. Requesting a call back

## 2024-05-13 ENCOUNTER — INFUSION (OUTPATIENT)
Dept: INFUSION THERAPY | Facility: OTHER | Age: 64
End: 2024-05-13
Attending: INTERNAL MEDICINE
Payer: COMMERCIAL

## 2024-05-13 ENCOUNTER — LAB VISIT (OUTPATIENT)
Dept: LAB | Facility: OTHER | Age: 64
End: 2024-05-13
Attending: INTERNAL MEDICINE
Payer: COMMERCIAL

## 2024-05-13 VITALS
TEMPERATURE: 98 F | RESPIRATION RATE: 16 BRPM | WEIGHT: 177.69 LBS | BODY MASS INDEX: 26.93 KG/M2 | OXYGEN SATURATION: 96 % | DIASTOLIC BLOOD PRESSURE: 70 MMHG | HEART RATE: 65 BPM | SYSTOLIC BLOOD PRESSURE: 150 MMHG | HEIGHT: 68 IN

## 2024-05-13 DIAGNOSIS — D61.818 PANCYTOPENIA: ICD-10-CM

## 2024-05-13 DIAGNOSIS — C90.01 MULTIPLE MYELOMA IN REMISSION: ICD-10-CM

## 2024-05-13 DIAGNOSIS — Z94.81 AUTOLOGOUS BONE MARROW TRANSPLANTATION STATUS: ICD-10-CM

## 2024-05-13 DIAGNOSIS — C90.00 IGA MYELOMA: ICD-10-CM

## 2024-05-13 DIAGNOSIS — C90.01 MULTIPLE MYELOMA IN REMISSION: Primary | ICD-10-CM

## 2024-05-13 LAB
ALBUMIN SERPL BCP-MCNC: 3.3 G/DL (ref 3.5–5.2)
ALP SERPL-CCNC: 56 U/L (ref 55–135)
ALT SERPL W/O P-5'-P-CCNC: 28 U/L (ref 10–44)
ANION GAP SERPL CALC-SCNC: 8 MMOL/L (ref 8–16)
AST SERPL-CCNC: 26 U/L (ref 10–40)
BASOPHILS # BLD AUTO: 0.02 K/UL (ref 0–0.2)
BASOPHILS NFR BLD: 0.5 % (ref 0–1.9)
BILIRUB SERPL-MCNC: 0.9 MG/DL (ref 0.1–1)
BUN SERPL-MCNC: 14 MG/DL (ref 8–23)
CALCIUM SERPL-MCNC: 9.1 MG/DL (ref 8.7–10.5)
CHLORIDE SERPL-SCNC: 104 MMOL/L (ref 95–110)
CO2 SERPL-SCNC: 28 MMOL/L (ref 23–29)
CREAT SERPL-MCNC: 1 MG/DL (ref 0.5–1.4)
DIFFERENTIAL METHOD BLD: ABNORMAL
EOSINOPHIL # BLD AUTO: 0.1 K/UL (ref 0–0.5)
EOSINOPHIL NFR BLD: 2.3 % (ref 0–8)
ERYTHROCYTE [DISTWIDTH] IN BLOOD BY AUTOMATED COUNT: 15.6 % (ref 11.5–14.5)
EST. GFR  (NO RACE VARIABLE): >60 ML/MIN/1.73 M^2
GLUCOSE SERPL-MCNC: 108 MG/DL (ref 70–110)
HCT VFR BLD AUTO: 38.2 % (ref 40–54)
HGB BLD-MCNC: 12.3 G/DL (ref 14–18)
IMM GRANULOCYTES # BLD AUTO: 0.02 K/UL (ref 0–0.04)
IMM GRANULOCYTES NFR BLD AUTO: 0.5 % (ref 0–0.5)
LYMPHOCYTES # BLD AUTO: 1.3 K/UL (ref 1–4.8)
LYMPHOCYTES NFR BLD: 32.8 % (ref 18–48)
MCH RBC QN AUTO: 31.2 PG (ref 27–31)
MCHC RBC AUTO-ENTMCNC: 32.2 G/DL (ref 32–36)
MCV RBC AUTO: 97 FL (ref 82–98)
MONOCYTES # BLD AUTO: 0.4 K/UL (ref 0.3–1)
MONOCYTES NFR BLD: 11 % (ref 4–15)
NEUTROPHILS # BLD AUTO: 2.1 K/UL (ref 1.8–7.7)
NEUTROPHILS NFR BLD: 52.9 % (ref 38–73)
NRBC BLD-RTO: 0 /100 WBC
PLATELET # BLD AUTO: 167 K/UL (ref 150–450)
PMV BLD AUTO: 9.6 FL (ref 9.2–12.9)
POTASSIUM SERPL-SCNC: 3.4 MMOL/L (ref 3.5–5.1)
PROT SERPL-MCNC: 6.3 G/DL (ref 6–8.4)
RBC # BLD AUTO: 3.94 M/UL (ref 4.6–6.2)
SODIUM SERPL-SCNC: 140 MMOL/L (ref 136–145)
WBC # BLD AUTO: 3.99 K/UL (ref 3.9–12.7)

## 2024-05-13 PROCEDURE — 36415 COLL VENOUS BLD VENIPUNCTURE: CPT | Performed by: INTERNAL MEDICINE

## 2024-05-13 PROCEDURE — 63600175 PHARM REV CODE 636 W HCPCS: Mod: JZ,JG | Performed by: INTERNAL MEDICINE

## 2024-05-13 PROCEDURE — 85025 COMPLETE CBC W/AUTO DIFF WBC: CPT | Performed by: INTERNAL MEDICINE

## 2024-05-13 PROCEDURE — 96401 CHEMO ANTI-NEOPL SQ/IM: CPT

## 2024-05-13 PROCEDURE — 80053 COMPREHEN METABOLIC PANEL: CPT | Performed by: INTERNAL MEDICINE

## 2024-05-13 RX ORDER — LENALIDOMIDE 10 MG/1
1 CAPSULE ORAL SEE ADMIN INSTRUCTIONS
Qty: 21 EACH | Refills: 0 | Status: SHIPPED | OUTPATIENT
Start: 2024-05-13

## 2024-05-13 RX ORDER — SODIUM CHLORIDE 0.9 % (FLUSH) 0.9 %
10 SYRINGE (ML) INJECTION
Status: DISCONTINUED | OUTPATIENT
Start: 2024-05-13 | End: 2024-05-13 | Stop reason: HOSPADM

## 2024-05-13 RX ORDER — BORTEZOMIB 3.5 MG/1
1.3 INJECTION, POWDER, LYOPHILIZED, FOR SOLUTION INTRAVENOUS; SUBCUTANEOUS
Status: CANCELLED | OUTPATIENT
Start: 2024-05-13

## 2024-05-13 RX ORDER — HEPARIN 100 UNIT/ML
500 SYRINGE INTRAVENOUS
Status: DISCONTINUED | OUTPATIENT
Start: 2024-05-13 | End: 2024-05-13 | Stop reason: HOSPADM

## 2024-05-13 RX ORDER — HEPARIN 100 UNIT/ML
500 SYRINGE INTRAVENOUS
Status: CANCELLED | OUTPATIENT
Start: 2024-05-13

## 2024-05-13 RX ORDER — BORTEZOMIB 3.5 MG/1
1.3 INJECTION, POWDER, LYOPHILIZED, FOR SOLUTION INTRAVENOUS; SUBCUTANEOUS
Status: COMPLETED | OUTPATIENT
Start: 2024-05-13 | End: 2024-05-13

## 2024-05-13 RX ORDER — SODIUM CHLORIDE 0.9 % (FLUSH) 0.9 %
10 SYRINGE (ML) INJECTION
Status: CANCELLED | OUTPATIENT
Start: 2024-05-13

## 2024-05-13 RX ADMIN — BORTEZOMIB 2.5 MG: 3.5 INJECTION, POWDER, LYOPHILIZED, FOR SOLUTION INTRAVENOUS; SUBCUTANEOUS at 09:05

## 2024-05-13 NOTE — PLAN OF CARE
Problem: Fatigue  Goal: Improved Activity Tolerance  Outcome: Progressing     Problem: Adult Inpatient Plan of Care  Goal: Plan of Care Review  Outcome: Progressing  Goal: Patient-Specific Goal (Individualized)  Outcome: Progressing     Problem: Nausea and Vomiting (Chemotherapy Effects)  Goal: Fluid and Electrolyte Balance  Outcome: Progressing       Patient arrived to clinic today for SQ Velcade injection. VS and assessment completed with no acute issues noted. Injection given in abdominal tissue and band aid applied. All questions answered, scheduled for next appt on 6-10-24 and left clinic ambulatory in [place].

## 2024-05-15 DIAGNOSIS — C90.01 MULTIPLE MYELOMA IN REMISSION: ICD-10-CM

## 2024-05-15 DIAGNOSIS — Z94.81 AUTOLOGOUS BONE MARROW TRANSPLANTATION STATUS: ICD-10-CM

## 2024-05-15 RX ORDER — ACYCLOVIR 400 MG/1
400 TABLET ORAL 2 TIMES DAILY
Qty: 180 TABLET | Refills: 4 | Status: SHIPPED | OUTPATIENT
Start: 2024-05-15

## 2024-05-16 DIAGNOSIS — C90.01 MULTIPLE MYELOMA IN REMISSION: ICD-10-CM

## 2024-05-17 RX ORDER — LENALIDOMIDE 10 MG/1
10 CAPSULE ORAL DAILY
Qty: 21 EACH | Refills: 0 | Status: SHIPPED | OUTPATIENT
Start: 2024-05-17

## 2024-05-17 NOTE — TELEPHONE ENCOUNTER
"----- Message from Tena Gordillo sent at 5/17/2024  3:02 PM CDT -----  Consult/Advisory:      Name Of Caller: [facility]     Contact Preference:  [phone]      What is the nature of the call?: Janae stated that a PA is needed for Rx:  lenalidomide 10 mg Cap    Ref # [id number]         Additional Notes:  "Thank you for all that you do for our patients"  "

## 2024-05-17 NOTE — TELEPHONE ENCOUNTER
Prior Authorization Portal   View all authorizations for this medication  Closed   5/17/2024  4:23 PM  Close reason: Other   Note from payer: Prior authorization in place - if requesting an increased quantity, a quantity limit review can be submitted via fax   Payer: [employer] [place] [employer] Case ID: [id number]  Waiting for Payer Response   5/17/2024  4:22 PM  Deadline to reply: May 17, 2025  4:15 PM Sending user: Janie Mas RN   Payer: [employer] [place] [employer] Case ID: [id number]    [phone]  Questions   Used for Prior Authorizations  Waiting for Payer Response   5/17/2024  4:14 PM  Sending user: Janie Mas RN   Payer: [employer] Patient's Payer    [phone]_________________    _____________________________________________________________    Spoke to pharmacy about PA. PA is not needed. They were just needing auth number from [employer]. Informed pharmacy that rx with auth number has been rachel'd up for the provider and will be escribed when Dr. Burt signs the prescription.

## 2024-05-17 NOTE — TELEPHONE ENCOUNTER
"----- Message from Tena Gordillo sent at 5/17/2024  3:02 PM CDT -----  Consult/Advisory:      Name Of Caller: [facility]     Contact Preference:  [phone]      What is the nature of the call?: Janae stated that a PA is needed for Rx:  lenalidomide 10 mg Cap    Ref # -[id number]         Additional Notes:  "Thank you for all that you do for our patients"  "

## 2024-06-09 DIAGNOSIS — C90.01 MULTIPLE MYELOMA IN REMISSION: ICD-10-CM

## 2024-06-09 DIAGNOSIS — Z94.81 AUTOLOGOUS BONE MARROW TRANSPLANTATION STATUS: ICD-10-CM

## 2024-06-09 RX ORDER — LENALIDOMIDE 10 MG/1
10 CAPSULE ORAL DAILY
Qty: 21 EACH | Refills: 0 | Status: CANCELLED | OUTPATIENT
Start: 2024-06-09

## 2024-06-10 ENCOUNTER — INFUSION (OUTPATIENT)
Dept: INFUSION THERAPY | Facility: OTHER | Age: 64
End: 2024-06-10
Attending: INTERNAL MEDICINE
Payer: COMMERCIAL

## 2024-06-10 ENCOUNTER — PATIENT MESSAGE (OUTPATIENT)
Dept: INTERNAL MEDICINE | Facility: CLINIC | Age: 64
End: 2024-06-10
Payer: COMMERCIAL

## 2024-06-10 ENCOUNTER — LAB VISIT (OUTPATIENT)
Dept: LAB | Facility: OTHER | Age: 64
End: 2024-06-10
Attending: INTERNAL MEDICINE
Payer: COMMERCIAL

## 2024-06-10 VITALS
BODY MASS INDEX: 27.43 KG/M2 | OXYGEN SATURATION: 97 % | RESPIRATION RATE: 16 BRPM | HEART RATE: 71 BPM | SYSTOLIC BLOOD PRESSURE: 136 MMHG | TEMPERATURE: 98 F | HEIGHT: 68 IN | DIASTOLIC BLOOD PRESSURE: 68 MMHG | WEIGHT: 181 LBS

## 2024-06-10 DIAGNOSIS — C90.01 MULTIPLE MYELOMA IN REMISSION: Primary | ICD-10-CM

## 2024-06-10 DIAGNOSIS — Z94.81 AUTOLOGOUS BONE MARROW TRANSPLANTATION STATUS: ICD-10-CM

## 2024-06-10 DIAGNOSIS — C90.00 IGA MYELOMA: ICD-10-CM

## 2024-06-10 LAB
ALBUMIN SERPL BCP-MCNC: 3.5 G/DL (ref 3.5–5.2)
ALP SERPL-CCNC: 58 U/L (ref 55–135)
ALT SERPL W/O P-5'-P-CCNC: 26 U/L (ref 10–44)
ANION GAP SERPL CALC-SCNC: 10 MMOL/L (ref 8–16)
AST SERPL-CCNC: 30 U/L (ref 10–40)
BASOPHILS # BLD AUTO: 0.02 K/UL (ref 0–0.2)
BASOPHILS NFR BLD: 0.5 % (ref 0–1.9)
BILIRUB SERPL-MCNC: 1.3 MG/DL (ref 0.1–1)
BUN SERPL-MCNC: 10 MG/DL (ref 8–23)
CALCIUM SERPL-MCNC: 8.8 MG/DL (ref 8.7–10.5)
CHLORIDE SERPL-SCNC: 104 MMOL/L (ref 95–110)
CO2 SERPL-SCNC: 26 MMOL/L (ref 23–29)
CREAT SERPL-MCNC: 1 MG/DL (ref 0.5–1.4)
DIFFERENTIAL METHOD BLD: ABNORMAL
EOSINOPHIL # BLD AUTO: 0.1 K/UL (ref 0–0.5)
EOSINOPHIL NFR BLD: 1.8 % (ref 0–8)
ERYTHROCYTE [DISTWIDTH] IN BLOOD BY AUTOMATED COUNT: 15.7 % (ref 11.5–14.5)
EST. GFR  (NO RACE VARIABLE): >60 ML/MIN/1.73 M^2
GLUCOSE SERPL-MCNC: 103 MG/DL (ref 70–110)
HCT VFR BLD AUTO: 38.4 % (ref 40–54)
HGB BLD-MCNC: 12.9 G/DL (ref 14–18)
IMM GRANULOCYTES # BLD AUTO: 0.01 K/UL (ref 0–0.04)
IMM GRANULOCYTES NFR BLD AUTO: 0.3 % (ref 0–0.5)
LYMPHOCYTES # BLD AUTO: 1.5 K/UL (ref 1–4.8)
LYMPHOCYTES NFR BLD: 39.4 % (ref 18–48)
MCH RBC QN AUTO: 31.4 PG (ref 27–31)
MCHC RBC AUTO-ENTMCNC: 33.6 G/DL (ref 32–36)
MCV RBC AUTO: 93 FL (ref 82–98)
MONOCYTES # BLD AUTO: 0.6 K/UL (ref 0.3–1)
MONOCYTES NFR BLD: 14.9 % (ref 4–15)
NEUTROPHILS # BLD AUTO: 1.7 K/UL (ref 1.8–7.7)
NEUTROPHILS NFR BLD: 43.1 % (ref 38–73)
NRBC BLD-RTO: 0 /100 WBC
PLATELET # BLD AUTO: 152 K/UL (ref 150–450)
PMV BLD AUTO: 9.5 FL (ref 9.2–12.9)
POTASSIUM SERPL-SCNC: 3.2 MMOL/L (ref 3.5–5.1)
PROT SERPL-MCNC: 6.4 G/DL (ref 6–8.4)
RBC # BLD AUTO: 4.11 M/UL (ref 4.6–6.2)
SODIUM SERPL-SCNC: 140 MMOL/L (ref 136–145)
WBC # BLD AUTO: 3.83 K/UL (ref 3.9–12.7)

## 2024-06-10 PROCEDURE — 63600175 PHARM REV CODE 636 W HCPCS: Mod: JZ,JG | Performed by: INTERNAL MEDICINE

## 2024-06-10 PROCEDURE — 85025 COMPLETE CBC W/AUTO DIFF WBC: CPT | Performed by: INTERNAL MEDICINE

## 2024-06-10 PROCEDURE — 96401 CHEMO ANTI-NEOPL SQ/IM: CPT

## 2024-06-10 PROCEDURE — 36415 COLL VENOUS BLD VENIPUNCTURE: CPT | Performed by: INTERNAL MEDICINE

## 2024-06-10 PROCEDURE — 80053 COMPREHEN METABOLIC PANEL: CPT | Performed by: INTERNAL MEDICINE

## 2024-06-10 RX ORDER — BORTEZOMIB 3.5 MG/1
1.3 INJECTION, POWDER, LYOPHILIZED, FOR SOLUTION INTRAVENOUS; SUBCUTANEOUS
Status: COMPLETED | OUTPATIENT
Start: 2024-06-10 | End: 2024-06-10

## 2024-06-10 RX ORDER — SODIUM CHLORIDE 0.9 % (FLUSH) 0.9 %
10 SYRINGE (ML) INJECTION
Status: CANCELLED | OUTPATIENT
Start: 2024-06-10

## 2024-06-10 RX ORDER — SODIUM CHLORIDE 0.9 % (FLUSH) 0.9 %
10 SYRINGE (ML) INJECTION
Status: DISCONTINUED | OUTPATIENT
Start: 2024-06-10 | End: 2024-06-10 | Stop reason: HOSPADM

## 2024-06-10 RX ORDER — BORTEZOMIB 3.5 MG/1
1.3 INJECTION, POWDER, LYOPHILIZED, FOR SOLUTION INTRAVENOUS; SUBCUTANEOUS
Status: CANCELLED | OUTPATIENT
Start: 2024-06-10

## 2024-06-10 RX ORDER — HEPARIN 100 UNIT/ML
500 SYRINGE INTRAVENOUS
Status: CANCELLED | OUTPATIENT
Start: 2024-06-10

## 2024-06-10 RX ORDER — HEPARIN 100 UNIT/ML
500 SYRINGE INTRAVENOUS
Status: DISCONTINUED | OUTPATIENT
Start: 2024-06-10 | End: 2024-06-10 | Stop reason: HOSPADM

## 2024-06-10 RX ORDER — LENALIDOMIDE 10 MG/1
1 CAPSULE ORAL SEE ADMIN INSTRUCTIONS
Qty: 21 EACH | Refills: 0 | Status: SHIPPED | OUTPATIENT
Start: 2024-06-10 | End: 2024-06-12 | Stop reason: SDUPTHER

## 2024-06-10 RX ORDER — DEXAMETHASONE 4 MG/1
TABLET ORAL
Qty: 80 TABLET | Refills: 11 | Status: SHIPPED | OUTPATIENT
Start: 2024-06-10

## 2024-06-10 RX ADMIN — BORTEZOMIB 2.5 MG: 3.5 INJECTION, POWDER, LYOPHILIZED, FOR SOLUTION INTRAVENOUS; SUBCUTANEOUS at 10:06

## 2024-06-10 NOTE — PLAN OF CARE
Problem: Adult Inpatient Plan of Care  Goal: Plan of Care Review  Outcome: Progressing  Goal: Patient-Specific Goal (Individualized)  Outcome: Progressing     Problem: Nausea and Vomiting (Chemotherapy Effects)  Goal: Fluid and Electrolyte Balance  Outcome: Progressing     Problem: Fatigue  Goal: Improved Activity Tolerance  Outcome: Progressing       Patient arrived to clinic today for SQ Velcade injection. VS and assessment completed with no acute issues noted. Injection given in abdominal tissue and band aid applied. All questions answered, scheduled for next appt on 7-12-24 @ and left clinic ambulatory in [facility].

## 2024-06-12 DIAGNOSIS — C90.01 MULTIPLE MYELOMA IN REMISSION: ICD-10-CM

## 2024-06-12 RX ORDER — LENALIDOMIDE 10 MG/1
1 CAPSULE ORAL DAILY
Qty: 21 EACH | Refills: 0 | Status: SHIPPED | OUTPATIENT
Start: 2024-06-12 | End: 2024-06-15 | Stop reason: SDUPTHER

## 2024-06-15 DIAGNOSIS — C90.01 MULTIPLE MYELOMA IN REMISSION: ICD-10-CM

## 2024-06-17 RX ORDER — LENALIDOMIDE 10 MG/1
1 CAPSULE ORAL DAILY
Qty: 21 EACH | Refills: 0 | Status: SHIPPED | OUTPATIENT
Start: 2024-06-17

## 2024-06-30 ENCOUNTER — PATIENT MESSAGE (OUTPATIENT)
Dept: HEMATOLOGY/ONCOLOGY | Facility: CLINIC | Age: 64
End: 2024-06-30
Payer: COMMERCIAL

## 2024-06-30 DIAGNOSIS — C90.01 MULTIPLE MYELOMA IN REMISSION: ICD-10-CM

## 2024-06-30 DIAGNOSIS — Z94.81 AUTOLOGOUS BONE MARROW TRANSPLANTATION STATUS: ICD-10-CM

## 2024-07-01 RX ORDER — FOLIC ACID-PYRIDOXINE-CYANOCOBALAMIN TAB 2.5-25-2 MG 2.5-25-2 MG
1 TAB ORAL DAILY
Qty: 30 TABLET | Refills: 11 | Status: SHIPPED | OUTPATIENT
Start: 2024-07-01

## 2024-07-11 DIAGNOSIS — C90.01 MULTIPLE MYELOMA IN REMISSION: ICD-10-CM

## 2024-07-12 ENCOUNTER — INFUSION (OUTPATIENT)
Dept: INFUSION THERAPY | Facility: HOSPITAL | Age: 64
End: 2024-07-12
Attending: INTERNAL MEDICINE
Payer: COMMERCIAL

## 2024-07-12 ENCOUNTER — OFFICE VISIT (OUTPATIENT)
Dept: HEMATOLOGY/ONCOLOGY | Facility: CLINIC | Age: 64
End: 2024-07-12
Payer: COMMERCIAL

## 2024-07-12 ENCOUNTER — LAB VISIT (OUTPATIENT)
Dept: LAB | Facility: HOSPITAL | Age: 64
End: 2024-07-12
Attending: INTERNAL MEDICINE
Payer: COMMERCIAL

## 2024-07-12 VITALS
BODY MASS INDEX: 27.32 KG/M2 | HEIGHT: 68 IN | SYSTOLIC BLOOD PRESSURE: 136 MMHG | OXYGEN SATURATION: 97 % | WEIGHT: 180.25 LBS | DIASTOLIC BLOOD PRESSURE: 74 MMHG | HEART RATE: 67 BPM | TEMPERATURE: 98 F

## 2024-07-12 VITALS
RESPIRATION RATE: 18 BRPM | OXYGEN SATURATION: 97 % | HEIGHT: 68 IN | TEMPERATURE: 98 F | SYSTOLIC BLOOD PRESSURE: 136 MMHG | BODY MASS INDEX: 27.32 KG/M2 | WEIGHT: 180.25 LBS | HEART RATE: 67 BPM | DIASTOLIC BLOOD PRESSURE: 74 MMHG

## 2024-07-12 DIAGNOSIS — G62.9 PERIPHERAL POLYNEUROPATHY: Primary | ICD-10-CM

## 2024-07-12 DIAGNOSIS — C90.01 MULTIPLE MYELOMA IN REMISSION: ICD-10-CM

## 2024-07-12 DIAGNOSIS — C90.00 IGA MYELOMA: ICD-10-CM

## 2024-07-12 DIAGNOSIS — Z79.01 CURRENT USE OF LONG TERM ANTICOAGULATION: ICD-10-CM

## 2024-07-12 DIAGNOSIS — I82.403 DEEP VEIN THROMBOSIS (DVT) OF BOTH LOWER EXTREMITIES, UNSPECIFIED CHRONICITY, UNSPECIFIED VEIN: ICD-10-CM

## 2024-07-12 DIAGNOSIS — Z94.81 AUTOLOGOUS BONE MARROW TRANSPLANTATION STATUS: ICD-10-CM

## 2024-07-12 DIAGNOSIS — D69.6 THROMBOCYTOPENIA, UNSPECIFIED: ICD-10-CM

## 2024-07-12 DIAGNOSIS — C90.01 MULTIPLE MYELOMA IN REMISSION: Primary | ICD-10-CM

## 2024-07-12 LAB
MAGNESIUM SERPL-MCNC: 2.1 MG/DL (ref 1.6–2.6)
PHOSPHATE SERPL-MCNC: 2.5 MG/DL (ref 2.7–4.5)

## 2024-07-12 PROCEDURE — 84165 PROTEIN E-PHORESIS SERUM: CPT | Mod: 26,,, | Performed by: PATHOLOGY

## 2024-07-12 PROCEDURE — 99999 PR PBB SHADOW E&M-EST. PATIENT-LVL III: CPT | Mod: PBBFAC,,, | Performed by: INTERNAL MEDICINE

## 2024-07-12 PROCEDURE — 84165 PROTEIN E-PHORESIS SERUM: CPT | Performed by: INTERNAL MEDICINE

## 2024-07-12 PROCEDURE — 86334 IMMUNOFIX E-PHORESIS SERUM: CPT | Mod: 26,,, | Performed by: PATHOLOGY

## 2024-07-12 PROCEDURE — 96401 CHEMO ANTI-NEOPL SQ/IM: CPT

## 2024-07-12 PROCEDURE — 63600175 PHARM REV CODE 636 W HCPCS: Mod: JG | Performed by: INTERNAL MEDICINE

## 2024-07-12 PROCEDURE — 83521 IG LIGHT CHAINS FREE EACH: CPT | Mod: 59 | Performed by: INTERNAL MEDICINE

## 2024-07-12 PROCEDURE — 36415 COLL VENOUS BLD VENIPUNCTURE: CPT | Performed by: INTERNAL MEDICINE

## 2024-07-12 PROCEDURE — 86334 IMMUNOFIX E-PHORESIS SERUM: CPT | Performed by: INTERNAL MEDICINE

## 2024-07-12 PROCEDURE — 84100 ASSAY OF PHOSPHORUS: CPT | Performed by: INTERNAL MEDICINE

## 2024-07-12 PROCEDURE — 83735 ASSAY OF MAGNESIUM: CPT | Performed by: INTERNAL MEDICINE

## 2024-07-12 RX ORDER — LENALIDOMIDE 10 MG/1
1 CAPSULE ORAL DAILY
Qty: 21 EACH | Refills: 0 | Status: SHIPPED | OUTPATIENT
Start: 2024-07-12

## 2024-07-12 RX ORDER — BORTEZOMIB 3.5 MG/1
1.3 INJECTION, POWDER, LYOPHILIZED, FOR SOLUTION INTRAVENOUS; SUBCUTANEOUS
Status: COMPLETED | OUTPATIENT
Start: 2024-07-12 | End: 2024-07-12

## 2024-07-12 RX ORDER — HEPARIN 100 UNIT/ML
500 SYRINGE INTRAVENOUS
Status: CANCELLED | OUTPATIENT
Start: 2024-07-12

## 2024-07-12 RX ORDER — LENALIDOMIDE 10 MG/1
CAPSULE ORAL
Qty: 21 EACH | Refills: 0 | Status: SHIPPED | OUTPATIENT
Start: 2024-07-12

## 2024-07-12 RX ORDER — BORTEZOMIB 3.5 MG/1
1.3 INJECTION, POWDER, LYOPHILIZED, FOR SOLUTION INTRAVENOUS; SUBCUTANEOUS
Status: CANCELLED | OUTPATIENT
Start: 2024-07-12

## 2024-07-12 RX ORDER — SODIUM CHLORIDE 0.9 % (FLUSH) 0.9 %
10 SYRINGE (ML) INJECTION
Status: CANCELLED | OUTPATIENT
Start: 2024-07-12

## 2024-07-12 RX ADMIN — BORTEZOMIB 2.5 MG: 3.5 INJECTION, POWDER, LYOPHILIZED, FOR SOLUTION INTRAVENOUS; SUBCUTANEOUS at 11:07

## 2024-07-12 NOTE — PLAN OF CARE
"  Problem: Fatigue  Goal: Improved Activity Tolerance  Outcome: Progressing  Intervention: Promote Improved Energy  Flowsheets (Taken 7/12/2024 1028)  Fatigue Management:   activity schedule adjusted   activity assistance provided   fatigue-related activity identified   frequent rest breaks encouraged   paced activity encouraged  Sleep/Rest Enhancement:   awakenings minimized   consistent schedule promoted   natural light exposure provided   noise level reduced   reading promoted   regular sleep/rest pattern promoted   relaxation techniques promoted  Activity Management:   Ambulated -L4   Up in chair - L3  Environmental Support:   calm environment promoted   caregiver consistency promoted   comfort object encouraged   distractions minimized   environmental consistency promoted   personal routine supported   rest periods encouraged  /74 (Patient Position: Standing)   Pulse 67   Temp 97.9 °F (36.6 °C)   Resp 18   Ht 5' 8" (1.727 m)   Wt 81.8 kg (180 lb 3.6 oz)   SpO2 97%   BMI 27.40 kg/m² Pleasant, alert and oriented patient to Chemo Infusion per self for C20 Velcade SQ - VSS and Velcade administered SQ to RLQ Abdomin, band-aide applied and patient tolerated injection with no AVE's - patient discharged to home with no concerns - RTC at [facility] on 8/9/24 for next injection       "

## 2024-07-12 NOTE — PROGRESS NOTES
Chief Complaint : Multiple myeloma, s/p tandem auto SCT, on VRd maintenance, hematology follow up      History of Present Illness : Vicente Connors is a 63 y.o. male here for hematology follow up. He has recently moved to [place] from [place]. He has history of stage IIIA, [employer] stage II IgA kappa multiple myeloma, which is likely intermediate-risk based upon the presence of t(4;14) with hyperdiploidy, based on records available through care everywhere.    IgA Kappa multiple Myeloma was diagnosed in 2014.   He was started on velcade, revlimid and dexamethasone at diagnosis.   Of note, he developed a popliteal DVT and was started on Lovenox and later switched Xarelto.   He completed a stem cell transplant with Dr Schuster in May 2015. He is s/p  tandem autologous transplant in 2015 and has been on triple maintenance with bortezomib, lenalidomide and dexamethasone since then.   He also has peripheral neuropathy, h/o LE DVT on chronic anti-coagulation, GERD, vertebral fracture. He has history of DVT and remains on chronic anticoagulation.  He has had multiple skeletal complications since his original diagnosis.  He had prolonged bout of diarrhea in January 2023. All stool studies were negative/ unremarkable.  He had COVID 19 infection in March 2023. He received paxlovid  MRI spine in March 2023 showed compression fracture of L1 with retropulsion of the posterior fragment as well as minimal compression fractures of the superior endplates of T8 and T11. MRI pelvis with facet arthropathy in the lower lumbar spine.  Hip joints exhibiting bilateral chondral thinning with labral fraying and osteophyte production.     Interval History: He is here for a follow up visit. He is doing well. His back pain is chronic, stable.     Review of patient's allergies indicates:  No Known Allergies        Current Outpatient Medications   Medication Sig    acyclovir (ZOVIRAX) 400 MG tablet Take 1 tablet (400 mg total) by  mouth 2 (two) times daily.    bortezomib (VELCADE INJ) Inject as directed. Pt gets every month    calcium carb/vit D3/minerals (CALCIUM-VITAMIN D ORAL)     dexAMETHasone (DECADRON) 4 MG Tab TAKE 10 TABLETS (40 MG DOSE) BY MOUTH DAY OF AND DAY AFTER VELCADE once monthly    erythromycin (ROMYCIN) ophthalmic ointment Place into the left eye 3 (three) times daily.    folic acid-vit B6-vit B12 2.5-25-2 mg (FOLBIC) 2.5-25-2 mg Tab Take 1 tablet by mouth once daily.    hydroCHLOROthiazide (HYDRODIURIL) 25 MG tablet Take 1 tablet (25 mg total) by mouth once daily.    Lactobacillus acidophilus (PROBIOTIC ORAL) Take by mouth. 24 billion CFU    lenalidomide (REVLIMID) 10 mg Cap Take 10 mg by mouth once daily On days 1-21 of a 28 day cycle. Brand name only. Auth # [id number] 5/17/24.    lenalidomide 10 mg Cap Take 1 capsule by mouth As instructed.    lenalidomide 10 mg Cap Take 1 capsule by mouth once daily Auth [id number] 6/12/24 BRAND NAME ONLY.    magnesium oxide 500 mg Tab once daily.    multivitamin with minerals (MULTI-VITAMIN W/MINERALS ORAL)     omeprazole (PRILOSEC) 20 MG capsule     rivaroxaban (XARELTO) 15 mg Tab Take 1 tablet (15 mg total) by mouth once daily.    sulfamethoxazole-trimethoprim 800-160mg (BACTRIM DS) 800-160 mg Tab TAKE 1 TABLET EVERY MONDAY, WEDNESDAY AND FRIDAY    zoledronic acid (ZOMETA IV) Inject into the vein. Pt gets every 3 months     No current facility-administered medications for this visit.      Review of Systems   Constitutional:  Positive for malaise/fatigue. Negative for chills, fever and weight loss.   HENT:  Negative for congestion, ear pain and sinus pain.    Eyes:  Negative for double vision, photophobia and pain.   Respiratory:  Negative for sputum production and stridor.    Cardiovascular:  Negative for chest pain, palpitations, orthopnea and claudication.   Gastrointestinal:  Negative for blood in stool, constipation, diarrhea, melena and nausea.   Genitourinary:  Negative for  dysuria, frequency and urgency.   Musculoskeletal:  Negative for myalgias and neck pain.   Neurological:  Positive for tingling. Negative for dizziness, tremors and speech change.   Endo/Heme/Allergies:  Negative for environmental allergies. Does not bruise/bleed easily.   Psychiatric/Behavioral:  Negative for substance abuse and suicidal ideas.           Vitals:    07/12/24 0954   BP: 136/74   Pulse: 67   Temp: 97.9 °F (36.6 °C)         PS: ECOG 1    Physical Exam  HENT:      Head: Normocephalic and atraumatic.      Mouth/Throat:      Pharynx: No posterior oropharyngeal erythema.   Eyes:      General: No scleral icterus.  Cardiovascular:      Rate and Rhythm: Normal rate and regular rhythm.   Pulmonary:      Effort: Pulmonary effort is normal. No respiratory distress.      Breath sounds: Normal breath sounds.   Abdominal:      General: There is no distension.      Palpations: There is no mass.      Tenderness: There is no abdominal tenderness.   Musculoskeletal:         General: No swelling.   Lymphadenopathy:      Cervical: No cervical adenopathy.   Neurological:      General: No focal deficit present.      Mental Status: He is alert and oriented to person, place, and time.      Cranial Nerves: No cranial nerve deficit.              1/9/2021 MRI Pelvis    IMPRESSION:   DIFFUSE PELVIC, PROXIMAL FEMORAL AND LOWER LUMBAR HETEROGENEOUS MARROW SIGNAL ABNORMALITY COULD RELATE TO HEMATOPOIETIC RED MARROW RECONVERSION. INFILTRATIVE MYELOMA CANNOT BE EXCLUDED. THERE IS A MORE DISCRETE 1.2 CM ENHANCING LESION IN THE LEFT FEMORAL HEAD THAT COULD ALSO REPRESENT AN ISLAND OF HEMATOPOIETIC RED MARROW. HOWEVER, CONTINUED ATTENTION ON FOLLOW-UP IS RECOMMENDED.    1/9/2021 MRI Lumbar spine/THoracic/Cervical  IMPRESSION:     1. There is interval increase in T2/STIR hyperintensity with enhancement extending from the left pedicle into the articular pillar and left transverse process of the T1 vertebra, although there is no definite  decrease in the intrinsic T1 hypointense signal in this location. This may also be a degenerative process, although focal myeloma is not definitely excluded. Clinical correlation is recommended, and follow-up is suggested on future examinations.      2. There has been interval increase in STIR hyperintensity and enhancement along the posterior endplates at the T1-T2 level, most likely progressive degenerative endplate marrow signal changes.     3. No other focal suspicious STIR hyperintense enhancing signal abnormalities are identified.     4. Stable multilevel compression fracture deformities      11/25/2020 Bone survey    IMPRESSION:   1. No lytic or blastic lesions are noted.      2. Stable compression fractures, as above.     3. Chronic changes, as above.       10/19/22 SPEP: Normal total protein, normal pattern.   10/19/22 sIFE: No monoclonal peaks identified.       3/2/23 SPEP: Normal total protein, normal pattern  3/2/23 sIFE: No monoclonal peaks identified.         3/9/23 MRI spine    FINDINGS:  Cervical spine:     Alignment: Normal.     Vertebrae: No fracture or marrow replacement process.     Discs: Multilevel mild disc height loss and desiccation.     Cord: Normal cord signal.     Craniocervical junction: Unremarkable.     C1-C2: Unremarkable.     Degenerative findings:     C2-C3: Small posterior disc osteophyte complex and bilateral facet arthropathy resulting in mild bilateral neural foraminal narrowing.  No significant spinal canal stenosis.     C3-C4: Posterior disc osteophyte complex and bilateral facet arthropathy resulting in moderate left and mild right neural foraminal narrowing.  Mild effacement of the anterior thecal sac without significant spinal canal stenosis.     C4-C5: Posterior disc osteophyte complex and bilateral facet arthropathy resulting in moderate predominantly left neural foraminal narrowing.  Effacement of the anterior thecal sac consistent with mild spinal canal stenosis.      C5-C6: Posterior disc osteophyte complex and bilateral uncovertebral spurring resulting in moderate bilateral neural foraminal narrowing.  Effacement of the anterior thecal sac consistent with mild spinal canal stenosis.     C6-C7: Posterior disc osteophyte complex and bilateral uncovertebral spurring findings result in moderate bilateral neural foraminal narrowing.  Effacement of the anterior thecal sac consistent with mild spinal canal stenosis.     C7-T1: No spinal canal stenosis or neural foraminal narrowing.     Paraspinal muscles & soft tissues: Normal.     Enhancement: No abnormal areas of enhancement.     Thoracic Spine:     Alignment: Normal.     Vertebrae: No infiltrative marrow replacing process.  T2 hyperintense lesions consistent with probable hemangiomas of the anterior vertebral body of T4 and posterior and mid vertebral body of T7.  Mild height loss of the superior endplate of T8 and T11.     Discs: Multilevel mild disc height loss and desiccation.     Cord: Normal contour and signal.     Degenerative findings: No significant spinal canal stenosis or neural foraminal narrowing.     Soft tissue: Unremarkable.     Enhancement: No abnormal areas of enhancement.     Lumbar spine:     Alignment: Normal.     Vertebrae: No infiltrative marrow replacing process.  Compression fracture of L1 with retropulsion of posterior fragment approximately 0.6 cm which abuts the conus medullaris without causing deformity or signal abnormality.     Discs: Normal height and signal.     Cord: Multilevel disc height loss and desiccation most pronounced at T12-L1, L1-L2, and L4-L5.     Degenerative findings:     T12-L1: Central disc protrusion.  No spinal canal stenosis or neural foraminal narrowing.     L1-L2: Circumferential disc bulge resulting in near complete effacement of the bilateral perineural fat, right greater than left, consistent with moderate bilateral neural foraminal narrowing.  No significant spinal canal  stenosis.     L2-L3: Circumferential disc bulge, bilateral facet arthropathy, and ligamentum flavum buckling findings result in partial effacement of the right perineural fat consistent with moderate right neural foraminal narrowing.  There is lateral effacement of the thecal sac and crowding of the cauda equina nerves with minimal residual CSF, consistent with moderate spinal canal stenosis.     L3-L4: Circumferential disc bulge, bilateral facet arthropathy, and ligamentum flavum buckling.  No spinal canal stenosis or neural foraminal narrowing.     L4-L5: Circumferential disc bulge, bilateral facet arthropathy, and ligamentum flavum buckling, findings result in minimal effacement of the perineural fat, consistent with mild bilateral neural foraminal narrowing.  Mild effacement of the right lateral thecal sac.     L5-S1: Bilateral facet arthropathy.  No spinal canal stenosis or neural foraminal narrowing.     Upper SI joints/sacrum: Unremarkable.     Soft tissue: Unremarkable.     Enhancement: No abnormal areas of enhancement.     Impression:     1. No findings concerning for multiple myeloma lesion.  2. Compression fracture of L1 with retropulsion of the posterior fragment.  3. Additional minimal compression fractures of the superior endplates of T8 and T11.  4. Degenerative changes of the cervical, thoracic, and lumbar spine, as detailed above.    3/20/23 MRI pelvis      FINDINGS:  No masses or fluid collections. No pelvic ascites or lymphadenopathy.     Visualized bowel loops are unremarkable.  Urinary bladder is decompressed, noting circumferential wall thickening.  Prostate demonstrates heterogeneous signal, likely BPH.     Regional muscles and tendons are unremarkable.     Bone marrow signal is maintained. No fracture, focal lesion or marrow infiltrative process.     Note made of facet arthropathy in the lower lumbar spine.  Hip joints exhibit bilateral chondral thinning with labral fraying and osteophyte  production.     Postoperative changes suggesting prior bilateral herniorrhaphy.     Impression:     1. No focal marrow lesion or diffuse infiltrative process.  2. Additional findings detailed above.    3/31/23 SPEP: Normal total protein, normal pattern.   3/31/23 sIFE: No monoclonal peaks identified.       9/5/23 SPEP:  Normal total protein, normal pattern.   9/5/23 sIFE : No monoclonal peaks identified.       12/4/23 SPEP: Decreased total protein. Decreased gamma globulins.  No paraprotein bands identified.     3/15/24  SPEP: Decreased total protein. Decreased gamma globulins.  No paraprotein bands identified.     Component      Latest Ref Rn 6/10/2024   WBC      3.90 - 12.70 K/uL 3.83 (L)    RBC      4.60 - 6.20 M/uL 4.11 (L)    Hemoglobin      14.0 - 18.0 g/dL 12.9 (L)    Hematocrit      40.0 - 54.0 % 38.4 (L)    MCV      82 - 98 fL 93    MCH      27.0 - 31.0 pg 31.4 (H)    MCHC      32.0 - 36.0 g/dL 33.6    RDW      11.5 - 14.5 % 15.7 (H)    Platelet Count      150 - 450 K/uL 152    MPV      9.2 - 12.9 fL 9.5    Immature Granulocytes      0.0 - 0.5 % 0.3    Gran # (ANC)      1.8 - 7.7 K/uL 1.7 (L)    Immature Grans (Abs)      0.00 - 0.04 K/uL 0.01    Lymph #      1.0 - 4.8 K/uL 1.5    Mono #      0.3 - 1.0 K/uL 0.6    Eos #      0.0 - 0.5 K/uL 0.1    Baso #      0.00 - 0.20 K/uL 0.02    nRBC      0 /100 WBC 0    Gran %      38.0 - 73.0 % 43.1    Lymph %      18.0 - 48.0 % 39.4    Mono %      4.0 - 15.0 % 14.9    Eos %      0.0 - 8.0 % 1.8    Basophil %      0.0 - 1.9 % 0.5    Differential Method Automated    Sodium      136 - 145 mmol/L 140    Potassium      3.5 - 5.1 mmol/L 3.2 (L)    Chloride      95 - 110 mmol/L 104    CO2      23 - 29 mmol/L 26    Glucose      70 - 110 mg/dL 103    BUN      8 - 23 mg/dL 10    Creatinine      0.5 - 1.4 mg/dL 1.0    Calcium      8.7 - 10.5 mg/dL 8.8    PROTEIN TOTAL      6.0 - 8.4 g/dL 6.4    Albumin      3.5 - 5.2 g/dL 3.5    BILIRUBIN TOTAL      0.1 - 1.0 mg/dL 1.3 (H)     ALP      55 - 135 U/L 58    AST      10 - 40 U/L 30    ALT      10 - 44 U/L 26    eGFR      >60 mL/min/1.73 m^2 >60    Anion Gap      8 - 16 mmol/L 10       Legend:  (L) Low  (H) High    Component      Latest Ref Rng 4/15/2024 7/12/2024   IgG      650 - 1600 mg/dL 676     IgA Level      40 - 350 mg/dL 173     IgM      50 - 300 mg/dL 13 (L)     Kappa Free Light Chains      0.33 - 1.94 mg/dL 1.48     Lambda Free Light Chains      0.57 - 2.63 mg/dL 1.18     Kappa/Lambda FLC Ratio      0.26 - 1.65  1.25     Phosphorus Level      2.7 - 4.5 mg/dL 3.2  2.5 (L)    Magnesium       1.6 - 2.6 mg/dL  2.1        4/15/24 SPEP  Normal total protein. Decreased gamma globulins.  No paraprotein bands identified   4/15/24 sIFE   No monoclonal peaks identified.           Assessment:    1. IgA kappa multiple myeloma in remission     -Vicente Connors is a 63 y.o.-year-old male with history of stage IIIA, ISS II IgA kappa multiple myeloma, which is likely intermediate-risk based upon the presence of t(4;14) with hyperdiploidy   -S/ p  tandem auto stem cell transplant in 2015 with a good biochemical remission  -Now on triple maintenance: velcade 1.3 mg/m2 sub q monthly, dex 40 mg PO day of and day after monthly velcade , revlimid 10 mg  PO for 21 days on / 7 days off every 28 days  --Repeat 24 hour urine completed 4/2022  -No monoclonal protein on SPEP/sIFE done on 9/5/23 ; sFLC ratio 2.03, minimally elevated  --He has been getting MRI spine every 2 years  -No lytic or enhancing lesions identified on MRI whole spine done on 3/9/23  -MRI pelvis on 3/20/23 also negative for enhancing/ lytic lesion  -No monoclonal protein on SPEP done on 12/4/23; sFLC ratio 1.78  -sFLC ratio 1.25 in April 2024  -he will continue the current regimen ( velcade monthly; revlimid 10mg on days1-21; dexamethasone twice monthly)      2. Normocytic anemia    --grade 1, Hgb 12.9 g/dl on 6/10/24  -likely secondary to chemotherapy    3. Leukopenia    --Likely  secondary to chemotherapy      4. Absolute neutropenia    --Likely secondary to chemotherapy  -Afebrile; ANC 1.78 on 6/10/24    5. Thrombocytopenia    -Mild,asymptomatic  -Platelet count 152k on 6/10/24        6. Low Back Pain: chronic and unchanged; not on any analgesic at this time    --Spine MRI done 1/21/21, result detailed above under imaging    7. History of LE DVT:     --Continue Xarelto   --Take with largest meal of the day  --Notify office of any scheduled procedure/surgery as Xarelto will need to be interrupted   -no bleeding diathesis reported    8. Infectious disease      --Continue prophylactic bactrim and acyclovir   --recommend annual flu vaccine, COVID 19 booster      9. Bone health:     --MRI spine1/21 neg   --Repeat MRI spine and pelvis in March 2023 did not demonstrate any new lesions  --Last Zometa given 4/15/24, q3 months  -he has pain in his right lower jaw today, will hold zometa until he sees his dentist    10. Diarrhea    --now improved  -stool studies neagtive in Jan 2023    11. Health Maintenance:     --Colonoscopy 2011 and 2017, repeat 2022   --PSA  up to date   --covid vaccine 3/31 pfizer booster done  --Got second booster 2/17/22    -recommend COVID booster             BMT Chart Routing      Follow up with physician    Follow up with [facility]    Provider visit type    Infusion scheduling note   NO ZOMETA  today; resume zometa after dental clearance , every 12 weeks   Injection scheduling note velcade today and every 4 weeks at [facility]   Labs CBC, CMP, SPEP, immunofixation, free light chains, immunoglobulins and phosphorus   Scheduling:  Preferred lab:  Lab interval:  cbc, cmp every 4 weeks at [facility]; cbc, cmp, spep, light chaions, igg, igm, iga, phos in [facility]   Imaging    Pharmacy appointment    Other referrals

## 2024-07-15 LAB
ALBUMIN SERPL ELPH-MCNC: 3.7 G/DL (ref 3.35–5.55)
ALPHA1 GLOB SERPL ELPH-MCNC: 0.27 G/DL (ref 0.17–0.41)
ALPHA2 GLOB SERPL ELPH-MCNC: 0.64 G/DL (ref 0.43–0.99)
B-GLOBULIN SERPL ELPH-MCNC: 0.87 G/DL (ref 0.5–1.1)
GAMMA GLOB SERPL ELPH-MCNC: 0.72 G/DL (ref 0.67–1.58)
INTERPRETATION SERPL IFE-IMP: NORMAL
KAPPA LC SER QL IA: 1.91 MG/DL (ref 0.33–1.94)
KAPPA LC/LAMBDA SER IA: 1.43 (ref 0.26–1.65)
LAMBDA LC SER QL IA: 1.34 MG/DL (ref 0.57–2.63)
PATHOLOGIST INTERPRETATION IFE: NORMAL
PATHOLOGIST INTERPRETATION SPE: NORMAL
PROT SERPL-MCNC: 6.2 G/DL (ref 6–8.4)

## 2024-08-09 ENCOUNTER — INFUSION (OUTPATIENT)
Dept: INFUSION THERAPY | Facility: OTHER | Age: 64
End: 2024-08-09
Attending: INTERNAL MEDICINE
Payer: COMMERCIAL

## 2024-08-09 ENCOUNTER — LAB VISIT (OUTPATIENT)
Dept: LAB | Facility: OTHER | Age: 64
End: 2024-08-09
Attending: INTERNAL MEDICINE
Payer: COMMERCIAL

## 2024-08-09 VITALS
WEIGHT: 181 LBS | RESPIRATION RATE: 16 BRPM | BODY MASS INDEX: 27.43 KG/M2 | HEART RATE: 68 BPM | OXYGEN SATURATION: 96 % | HEIGHT: 68 IN | DIASTOLIC BLOOD PRESSURE: 74 MMHG | SYSTOLIC BLOOD PRESSURE: 146 MMHG

## 2024-08-09 DIAGNOSIS — C90.01 MULTIPLE MYELOMA IN REMISSION: Primary | ICD-10-CM

## 2024-08-09 DIAGNOSIS — C90.01 MULTIPLE MYELOMA IN REMISSION: ICD-10-CM

## 2024-08-09 LAB
ALBUMIN SERPL BCP-MCNC: 3.6 G/DL (ref 3.5–5.2)
ALP SERPL-CCNC: 64 U/L (ref 55–135)
ALT SERPL W/O P-5'-P-CCNC: 34 U/L (ref 10–44)
ANION GAP SERPL CALC-SCNC: 11 MMOL/L (ref 8–16)
AST SERPL-CCNC: 30 U/L (ref 10–40)
BASOPHILS # BLD AUTO: 0.03 K/UL (ref 0–0.2)
BASOPHILS NFR BLD: 0.9 % (ref 0–1.9)
BILIRUB SERPL-MCNC: 1.4 MG/DL (ref 0.1–1)
BUN SERPL-MCNC: 12 MG/DL (ref 8–23)
CALCIUM SERPL-MCNC: 9.4 MG/DL (ref 8.7–10.5)
CHLORIDE SERPL-SCNC: 102 MMOL/L (ref 95–110)
CO2 SERPL-SCNC: 28 MMOL/L (ref 23–29)
CREAT SERPL-MCNC: 1 MG/DL (ref 0.5–1.4)
DIFFERENTIAL METHOD BLD: ABNORMAL
EOSINOPHIL # BLD AUTO: 0.1 K/UL (ref 0–0.5)
EOSINOPHIL NFR BLD: 3 % (ref 0–8)
ERYTHROCYTE [DISTWIDTH] IN BLOOD BY AUTOMATED COUNT: 15.3 % (ref 11.5–14.5)
EST. GFR  (NO RACE VARIABLE): >60 ML/MIN/1.73 M^2
GLUCOSE SERPL-MCNC: 100 MG/DL (ref 70–110)
HCT VFR BLD AUTO: 40.4 % (ref 40–54)
HGB BLD-MCNC: 13.7 G/DL (ref 14–18)
IMM GRANULOCYTES # BLD AUTO: 0.01 K/UL (ref 0–0.04)
IMM GRANULOCYTES NFR BLD AUTO: 0.3 % (ref 0–0.5)
LYMPHOCYTES # BLD AUTO: 1.5 K/UL (ref 1–4.8)
LYMPHOCYTES NFR BLD: 44.2 % (ref 18–48)
MCH RBC QN AUTO: 32 PG (ref 27–31)
MCHC RBC AUTO-ENTMCNC: 33.9 G/DL (ref 32–36)
MCV RBC AUTO: 94 FL (ref 82–98)
MONOCYTES # BLD AUTO: 0.5 K/UL (ref 0.3–1)
MONOCYTES NFR BLD: 13.4 % (ref 4–15)
NEUTROPHILS # BLD AUTO: 1.3 K/UL (ref 1.8–7.7)
NEUTROPHILS NFR BLD: 38.2 % (ref 38–73)
NRBC BLD-RTO: 0 /100 WBC
PLATELET # BLD AUTO: 157 K/UL (ref 150–450)
PMV BLD AUTO: 9.5 FL (ref 9.2–12.9)
POTASSIUM SERPL-SCNC: 3.1 MMOL/L (ref 3.5–5.1)
PROT SERPL-MCNC: 6.6 G/DL (ref 6–8.4)
RBC # BLD AUTO: 4.28 M/UL (ref 4.6–6.2)
SODIUM SERPL-SCNC: 141 MMOL/L (ref 136–145)
WBC # BLD AUTO: 3.35 K/UL (ref 3.9–12.7)

## 2024-08-09 PROCEDURE — 96401 CHEMO ANTI-NEOPL SQ/IM: CPT

## 2024-08-09 PROCEDURE — 85025 COMPLETE CBC W/AUTO DIFF WBC: CPT | Performed by: INTERNAL MEDICINE

## 2024-08-09 PROCEDURE — 80053 COMPREHEN METABOLIC PANEL: CPT | Performed by: INTERNAL MEDICINE

## 2024-08-09 PROCEDURE — 63600175 PHARM REV CODE 636 W HCPCS: Mod: JZ,JG | Performed by: INTERNAL MEDICINE

## 2024-08-09 PROCEDURE — 36415 COLL VENOUS BLD VENIPUNCTURE: CPT | Performed by: INTERNAL MEDICINE

## 2024-08-09 RX ORDER — SODIUM CHLORIDE 0.9 % (FLUSH) 0.9 %
10 SYRINGE (ML) INJECTION
Status: CANCELLED | OUTPATIENT
Start: 2024-08-09

## 2024-08-09 RX ORDER — LENALIDOMIDE 10 MG/1
1 CAPSULE ORAL SEE ADMIN INSTRUCTIONS
Qty: 21 EACH | Refills: 0 | Status: SHIPPED | OUTPATIENT
Start: 2024-08-09

## 2024-08-09 RX ORDER — HEPARIN 100 UNIT/ML
500 SYRINGE INTRAVENOUS
Status: CANCELLED | OUTPATIENT
Start: 2024-08-09

## 2024-08-09 RX ORDER — BORTEZOMIB 3.5 MG/1
1.3 INJECTION, POWDER, LYOPHILIZED, FOR SOLUTION INTRAVENOUS; SUBCUTANEOUS
Status: COMPLETED | OUTPATIENT
Start: 2024-08-09 | End: 2024-08-09

## 2024-08-09 RX ORDER — HEPARIN 100 UNIT/ML
500 SYRINGE INTRAVENOUS
Status: DISCONTINUED | OUTPATIENT
Start: 2024-08-09 | End: 2024-08-09 | Stop reason: HOSPADM

## 2024-08-09 RX ORDER — BORTEZOMIB 3.5 MG/1
1.3 INJECTION, POWDER, LYOPHILIZED, FOR SOLUTION INTRAVENOUS; SUBCUTANEOUS
Status: CANCELLED | OUTPATIENT
Start: 2024-08-09

## 2024-08-09 RX ORDER — LENALIDOMIDE 10 MG/1
1 CAPSULE ORAL DAILY
Qty: 21 EACH | Refills: 0 | Status: SHIPPED | OUTPATIENT
Start: 2024-08-09

## 2024-08-09 RX ORDER — SODIUM CHLORIDE 0.9 % (FLUSH) 0.9 %
10 SYRINGE (ML) INJECTION
Status: DISCONTINUED | OUTPATIENT
Start: 2024-08-09 | End: 2024-08-09 | Stop reason: HOSPADM

## 2024-08-09 RX ADMIN — BORTEZOMIB 2.5 MG: 3.5 INJECTION, POWDER, LYOPHILIZED, FOR SOLUTION INTRAVENOUS; SUBCUTANEOUS at 10:08

## 2024-08-12 ENCOUNTER — PATIENT MESSAGE (OUTPATIENT)
Dept: HEMATOLOGY/ONCOLOGY | Facility: CLINIC | Age: 64
End: 2024-08-12
Payer: COMMERCIAL

## 2024-08-12 RX ORDER — HEPARIN 100 UNIT/ML
500 SYRINGE INTRAVENOUS
OUTPATIENT
Start: 2024-08-12

## 2024-08-12 RX ORDER — SODIUM CHLORIDE 0.9 % (FLUSH) 0.9 %
10 SYRINGE (ML) INJECTION
OUTPATIENT
Start: 2024-08-12

## 2024-08-12 RX ORDER — ZOLEDRONIC ACID 0.04 MG/ML
4 INJECTION, SOLUTION INTRAVENOUS
OUTPATIENT
Start: 2024-08-12

## 2024-08-13 ENCOUNTER — PATIENT MESSAGE (OUTPATIENT)
Dept: HEMATOLOGY/ONCOLOGY | Facility: CLINIC | Age: 64
End: 2024-08-13
Payer: COMMERCIAL

## 2024-08-13 DIAGNOSIS — C90.01 MULTIPLE MYELOMA IN REMISSION: ICD-10-CM

## 2024-08-14 RX ORDER — LENALIDOMIDE 10 MG/1
1 CAPSULE ORAL DAILY
Qty: 21 EACH | Refills: 0 | Status: SHIPPED | OUTPATIENT
Start: 2024-08-14

## 2024-08-21 ENCOUNTER — INFUSION (OUTPATIENT)
Dept: INFUSION THERAPY | Facility: OTHER | Age: 64
End: 2024-08-21
Attending: INTERNAL MEDICINE
Payer: COMMERCIAL

## 2024-08-21 VITALS
TEMPERATURE: 98 F | RESPIRATION RATE: 16 BRPM | DIASTOLIC BLOOD PRESSURE: 74 MMHG | OXYGEN SATURATION: 96 % | HEART RATE: 74 BPM | SYSTOLIC BLOOD PRESSURE: 131 MMHG

## 2024-08-21 DIAGNOSIS — C90.01 MULTIPLE MYELOMA IN REMISSION: Primary | ICD-10-CM

## 2024-08-21 PROCEDURE — 96365 THER/PROPH/DIAG IV INF INIT: CPT

## 2024-08-21 PROCEDURE — 63600175 PHARM REV CODE 636 W HCPCS: Performed by: INTERNAL MEDICINE

## 2024-08-21 PROCEDURE — 25000003 PHARM REV CODE 250: Performed by: INTERNAL MEDICINE

## 2024-08-21 RX ORDER — HEPARIN 100 UNIT/ML
500 SYRINGE INTRAVENOUS
Status: DISCONTINUED | OUTPATIENT
Start: 2024-08-21 | End: 2024-08-21 | Stop reason: HOSPADM

## 2024-08-21 RX ORDER — SODIUM CHLORIDE 0.9 % (FLUSH) 0.9 %
10 SYRINGE (ML) INJECTION
Status: DISCONTINUED | OUTPATIENT
Start: 2024-08-21 | End: 2024-08-21 | Stop reason: HOSPADM

## 2024-08-21 RX ADMIN — ZOLEDRONIC ACID 4 MG: 4 INJECTION, SOLUTION, CONCENTRATE INTRAVENOUS at 09:08

## 2024-08-21 RX ADMIN — SODIUM CHLORIDE: 9 INJECTION, SOLUTION INTRAVENOUS at 08:08

## 2024-08-21 NOTE — PLAN OF CARE
Problem: Adult Inpatient Plan of Care  Goal: Plan of Care Review  Outcome: Progressing  Goal: Patient-Specific Goal (Individualized)  Outcome: Progressing       Patient presented today for iv zometa infusion. PIV started to LAC and infusion given with no issues. VS remained stable throughout visit and assessment provided no issues. PIV removed w/o complications and all questions answered. Patient scheduled for next appt on 9-6-24 and left clinic ambulatory in no acute distress.

## 2024-08-31 ENCOUNTER — ON-DEMAND VIRTUAL (OUTPATIENT)
Dept: URGENT CARE | Facility: CLINIC | Age: 64
End: 2024-08-31
Payer: COMMERCIAL

## 2024-08-31 ENCOUNTER — NURSE TRIAGE (OUTPATIENT)
Dept: ADMINISTRATIVE | Facility: CLINIC | Age: 64
End: 2024-08-31
Payer: COMMERCIAL

## 2024-08-31 ENCOUNTER — PATIENT MESSAGE (OUTPATIENT)
Dept: HEMATOLOGY/ONCOLOGY | Facility: CLINIC | Age: 64
End: 2024-08-31
Payer: COMMERCIAL

## 2024-08-31 DIAGNOSIS — U07.1 COVID-19: Primary | ICD-10-CM

## 2024-08-31 PROCEDURE — 99214 OFFICE O/P EST MOD 30 MIN: CPT | Mod: 95,,, | Performed by: PHYSICIAN ASSISTANT

## 2024-08-31 RX ORDER — NIRMATRELVIR AND RITONAVIR 300-100 MG
KIT ORAL
Qty: 30 TABLET | Refills: 0 | Status: SHIPPED | OUTPATIENT
Start: 2024-08-31 | End: 2024-09-05

## 2024-08-31 NOTE — PROGRESS NOTES
Subjective:      Patient ID: Vicente Connors is a 63 y.o. male.    Vitals:  vitals were not taken for this visit.     Chief Complaint: URI (Tested positive for COVID today - congestion, sneezing, and cough for 3 days)      Visit Type: TELE AUDIOVISUAL    Present with the patient at the time of consultation: TELEMED PRESENT WITH PATIENT: None    Location: [place]    Past Medical History:   Diagnosis Date    Cancer     GERD (gastroesophageal reflux disease)     Mild hypertension 3/15/2021     Past Surgical History:   Procedure Laterality Date    APPENDECTOMY      CATARACT EXTRACTION, BILATERAL Bilateral 2021     Review of patient's allergies indicates:  No Known Allergies  Current Outpatient Medications on File Prior to Visit   Medication Sig Dispense Refill    acyclovir (ZOVIRAX) 400 MG tablet Take 1 tablet (400 mg total) by mouth 2 (two) times daily. 180 tablet 4    bortezomib (VELCADE INJ) Inject as directed. Pt gets every month      calcium carb/vit D3/minerals (CALCIUM-VITAMIN D ORAL)       dexAMETHasone (DECADRON) 4 MG Tab TAKE 10 TABLETS (40 MG DOSE) BY MOUTH DAY OF AND DAY AFTER VELCADE once monthly 80 tablet 11    folic acid-vit B6-vit B12 2.5-25-2 mg (FOLBIC) 2.5-25-2 mg Tab Take 1 tablet by mouth once daily. 30 tablet 11    hydroCHLOROthiazide (HYDRODIURIL) 25 MG tablet Take 1 tablet (25 mg total) by mouth once daily. 90 tablet 11    lenalidomide 10 mg Cap Take 1 capsule by mouth once daily Auth [id number] 6/12/24 BRAND NAME ONLY. 21 each 0    lenalidomide 10 mg Cap Take 1 capsule by mouth As instructed. 21 each 0    magnesium oxide 500 mg Tab once daily.      multivitamin with minerals (MULTI-VITAMIN W/MINERALS ORAL)       omeprazole (PRILOSEC) 20 MG capsule       REVLIMID 10 mg Cap Take 1 capsule by mouth once daily TAKE 1 CAPSULE ONCE DAILY ON DAYS 1 THROUGH 21 OF A 28 DAY CYCLE. Auth [id number] 8/9/24. 21 each 0    rivaroxaban (XARELTO) 15 mg Tab Take 1 tablet (15 mg total) by mouth once daily. 90  tablet 4    sulfamethoxazole-trimethoprim 800-160mg (BACTRIM DS) 800-160 mg Tab TAKE 1 TABLET EVERY MONDAY, WEDNESDAY AND FRIDAY 36 tablet 3    zoledronic acid (ZOMETA IV) Inject into the vein. Pt gets every 3 months       No current facility-administered medications on file prior to visit.     Family History   Problem Relation Name Age of Onset    Heart disease Mother          hjeart attck    Diabetes Father      Heart disease Brother          ? SCD       Medications Ordered                [id number] - [facility] [address] [facility]   [address] [facility] [place] [facility] [address]    Telephone: [phone]   Fax: [phone]   Hours: Not open 24 hours                         E-Prescribed (1 of 1)              nirmatrelvir-ritonavir (PAXLOVID) 300 mg (150 mg x 2)-100 mg copackaged tablets (EUA)    Sig: Take 3 tablets by mouth 2 (two) times daily. Each dose contains 2 nirmatrelvir (pink tablets) and 1 ritonavir (white tablet). Take all 3 tablets together       Start: 8/31/24     Quantity: 30 tablet Refills: 0                           Ohs Peq Odvv Intake    8/31/2024 12:12 PM CDT - Filed by Patient   What is your current physical address in the event of a medical emergency? [address] [place] [address]   Are you able to take your vital signs? Yes   Systolic Blood Pressure: 143   Diastolic Blood Pressure: 78   Weight: 175   Height: 68   Pulse: 85   Temperature: 99.7   Respiration rate: 97   Pulse Oxygen: 84   Please attach any relevant images or files          Pt c/o cough, congestion, and sore throat for 3 days and tested positive for COVID today.  PMH includes multiple myeloma and bone marrow transplant.  Pt reports that this is his 3rd COVID diagnosis.  Last diagnosed with COVID in 12/2023 and treated with Paxlovid at that time.  Denies SOB or difficulty breathing.        Constitution: Negative for chills and fever.   HENT:  Positive for congestion and  sore throat.    Respiratory:  Positive for cough. Negative for shortness of breath.    Gastrointestinal:  Negative for vomiting.        Objective:   The physical exam was conducted virtually.  Physical Exam   Constitutional: He is oriented to person, place, and time.   Pulmonary/Chest: Effort normal. No respiratory distress.   Abdominal: Normal appearance.   Neurological: no focal deficit. He is alert and oriented to person, place, and time.   Psychiatric: His behavior is normal. Mood normal.       Assessment:     1. COVID-19        Plan:       COVID-19  -     nirmatrelvir-ritonavir (PAXLOVID) 300 mg (150 mg x 2)-100 mg copackaged tablets (EUA); Take 3 tablets by mouth 2 (two) times daily. Each dose contains 2 nirmatrelvir (pink tablets) and 1 ritonavir (white tablet). Take all 3 tablets together  Dispense: 30 tablet; Refill: 0          Medical Decision Making:   History:   Old Records Summarized: records from clinic visits.       <> Summary of Records: Previous note from Dr. Burt (Hem-Onc specialist) advised pt to hold Xarelto and Revlimid while on Paxlovid (12/2023).        Per chart review, Dr. Burt (Hem-Onc specialist) advised pt to hold Xarelto and Revlimid while on Paxlovid.  Pt advised to follow these same instructions and to f/u with Dr. Burt due to recent COVID diagnosis.  Self-isolation guidelines reviewed with pt.  Strong ER precautions - go to ER if SOB or worsening symptoms.    Patient Education        COVID-19 Discharge Instructions   About this topic   Coronavirus disease 2019 is also known as COVID-19. It is a viral illness that infects the lungs. It is caused by a virus called SARS-associated coronavirus (SARS-CoV-2).  The signs of COVID-19 most often start a few days after you have been infected. In some people, it takes longer to show signs. Others never show signs of the infection. You may have a cough, fever, shaking chills and it may be hard to breathe. You may be very tired, have muscle  aches, a headache or sore throat. Some people have an upset stomach or loose stools. Others lose their sense of smell or taste. You may not have these signs all the time and they may come and go while you are sick.  The virus spreads easily through droplets when you talk, sneeze, or cough. You can pass the virus to others when you are talking close together, singing, hugging, sharing food, or shaking hands. Doctors believe the germs also survive on surfaces like tables, door handles, and telephones. However, this is not a common way that COVID-19 spreads. Doctors believe you can also spread the infection even if you dont have any symptoms, but they do not know how that happens. This is why getting vaccinated is one of the best ways to keep you healthy and slow the spread of the virus.  Some people have a mild case of COVID-19 and are able to stay at home and away from others until they feel better. Others may need to be in the hospital if they are very sick. Some people with COVID-19 can have some symptoms for weeks or months. People with COVID-19 must isolate themselves. You can start to be around others when your doctor says it is safe to do so.       What care is needed at home?   Ask your doctor what you need to do when you go home. Make sure you ask questions if you do not understand what the doctor says.  Drink lots of water, juice, or broth to replace fluids lost from a fever.  You may use cool mist humidifiers to help ease congestion and coughing.  Use 2 to 3 pillows to prop yourself up when you lie down to make it easier to breathe and sleep.  Do not smoke and do not drink beer, wine, and mixed drinks (alcohol).  To lower the chance of passing the infection to others, get a COVID-19 vaccine after your infection has resolved.  If you have not been fully vaccinated:  Wear a mask over your mouth and nose if you are around others who are not sick. Cloth masks work best if they have more than one layer of  fabric.  Wash your hands often.  Stay home in a separate room, if possible, away from others. Only go out to get medical care.  Use a separate bathroom if possible.  Do not make food for others.  What follow-up care is needed?   Your doctor may ask you to make visits to the office to check on your progress. Be sure to keep these visits. Make sure you wear a mask at these visits.  If you can, tell the staff you have COVID-19 ahead of time so they can take extra care to stop the disease from spreading.  It may take a few weeks before your health returns to normal.  What drugs may be needed?   The doctor may order drugs to:  Help with breathing  Help with fever  Help with swelling in your airways and lungs  Control coughing  Ease a sore throat  Help a runny or stuffy nose  Will physical activity be limited?   You may have to limit your physical activity. Talk to your doctor about the right amount of activity for you. If you have been very sick with COVID-19, it can take some time to get your strength back.  Will there be any other care needed?   Doctors do not know how long you can pass the virus on to others after you are sick. This is why it is important to stay in a separate room, if possible, when you are sick. For now, doctors are giving general guidelines for you to follow after you have been sick. Before you go around other people, you should:  Be fever free for 24 hours without taking any drugs to lower the fever  Have no symptoms of cough or shortness of breath  Wait at least 10 days after first having symptoms or your first positive test, and you need to be symptom free as above. Some experts suggest waiting 20 days if you have had a more severe infection.  Talk with your doctor about getting a COVID-19 vaccine.  What problems could happen?   Fluid loss. This is dehydration.  Short-term or long-term lung damage  Heart problems  Death  When do I need to call the doctor?   You are having so much trouble  breathing that you can only say one or two words at a time.  You need to sit upright at all times to be able to breathe and/or cannot lie down.  You are very confused or cannot stay awake.  Your lips or skin start to turn blue or grey.  You think you might be having a medical emergency. Some examples of medical emergencies are:  Severe chest pain.  Not able to speak or move normally.  You have trouble breathing when talking or sitting still.  You have new shortness of breath.  You become weak or dizzy.  You have very dark urine or do not pass urine for more than 8 hours.  You have new or worsening COVID-19 symptoms like:  Fever  Cough  Feeling very tired  Shaking chills  Headache  Trouble swallowing  Throwing up  Loose stools  Reddish purple spots on your fingers or toes  Teach Back: Helping You Understand   The Teach Back Method helps you understand the information we are giving you. After you talk with the staff, tell them in your own words what you learned. This helps to make sure the staff has described each thing clearly. It also helps to explain things that may have been confusing. Before going home, make sure you can do these:  I can tell you about my condition.  I can tell you what may help ease my breathing.  I can tell you what I can do to help avoid passing the infection to others.  I can tell you what I will do if I have trouble breathing; feel sleepy or confused; or my fingertips, fingernails, skin, or lips are blue.  Where can I learn more?   Centers for Disease Control and Prevention  https://www.cdc.gov/coronavirus/2019-ncov/about/index.html   Centers for Disease Control and Prevention  https://www.cdc.gov/coronavirus/2019-ncov/hcp/disposition-in-home-patients.html   World Health Organization  https://www.who.int/news-room/q-a-detail/y-w-qxnwlunprmrgz   Last Reviewed Date   2021-10-05  Consumer Information Use and Disclaimer   This information is not specific medical advice and does not replace  information you receive from your health care provider. This is only a brief summary of general information. It does NOT include all information about conditions, illnesses, injuries, tests, procedures, treatments, therapies, discharge instructions or life-style choices that may apply to you. You must talk with your health care provider for complete information about your health and treatment options. This information should not be used to decide whether or not to accept your health care providers advice, instructions or recommendations. Only your health care provider has the knowledge and training to provide advice that is right for you.  Copyright   Copyright © 2021 [employer] and its affiliates and/or licensors. All rights reserved.

## 2024-08-31 NOTE — TELEPHONE ENCOUNTER
OOC NT return call -  Pt reports took Covid 19+ home test today. Having Sx of slight cough, sore throat and sinus congestion. Home PO2=96. Covid protocol followed and pt advised  to speak with provider with in 24 hours.  On demand VV offered and accepted. Encounter routed to PCP   Reason for Disposition   [1] HIGH RISK patient (e.g., weak immune system, age > 64 years, obesity with BMI 30 or higher, pregnant, chronic lung disease) AND [2] COVID symptoms (e.g., cough, fever)  (Exceptions: Already seen by PCP and no new or worsening symptoms.)    Additional Information   Negative: SEVERE difficulty breathing (e.g., struggling for each breath, speaks in single words)   Negative: Difficult to awaken or acting confused (e.g., disoriented, slurred speech)   Negative: Bluish (or gray) lips or face now   Negative: Shock suspected (e.g., cold/pale/clammy skin, too weak to stand, low BP, rapid pulse)   Negative: Sounds like a life-threatening emergency to the triager   Negative: SEVERE or constant chest pain or pressure  (Exception: Mild central chest pain, present only when coughing.)   Negative: MODERATE difficulty breathing (e.g., speaks in phrases, SOB even at rest, pulse 100-120)   Negative: [1] Headache AND [2] stiff neck (can't touch chin to chest)   Negative: Oxygen level (e.g., pulse oximetry) 90% or lower   Negative: Chest pain or pressure  (Exception: MILD central chest pain, present only when coughing.)   Negative: [1] Drinking very little AND [2] dehydration suspected (e.g., no urine > 12 hours, very dry mouth, very lightheaded)   Negative: Patient sounds very sick or weak to the triager   Negative: MILD difficulty breathing (e.g., minimal/no SOB at rest, SOB with walking, pulse <100)   Negative: Fever > 103 F (39.4 C)   Negative: [1] Fever > 101 F (38.3 C) AND [2] age > 60 years   Negative: [1] Fever > 100 F (37.8 C) AND [2] bedridden (e.g., CVA, chronic illness, recovering from surgery)   Negative: Oxygen level  (e.g., pulse oximetry) 91 to 94%    Protocols used: COVID-19 - Diagnosed or [id number]

## 2024-08-31 NOTE — PATIENT INSTRUCTIONS
You have tested positive for COVID-19 today.           ISOLATION    If your symptoms have improved and you have not had fever for at least 24 hours without having taken medication to reduce your fever, you can end your isolation.  Wear a mask for 5 days after ending isolation.     Go to the ER if shortness or breath or difficulty breathing.  Do not take Xarelto or Revlimid while on Paxlovid.

## 2024-09-01 ENCOUNTER — NURSE TRIAGE (OUTPATIENT)
Dept: ADMINISTRATIVE | Facility: CLINIC | Age: 64
End: 2024-09-01
Payer: COMMERCIAL

## 2024-09-01 NOTE — TELEPHONE ENCOUNTER
Pt calling w med compatibility question after recent Covid dx. Advised, per protocol and verbalizes understanding.     Reason for Disposition   [1] Caller has medicine question about med NOT prescribed by PCP AND [2] triager unable to answer question (e.g., compatibility with other med, storage)    Protocols used: Medication Question Call-A-AH

## 2024-09-06 ENCOUNTER — INFUSION (OUTPATIENT)
Dept: INFUSION THERAPY | Facility: OTHER | Age: 64
End: 2024-09-06
Attending: INTERNAL MEDICINE
Payer: COMMERCIAL

## 2024-09-06 ENCOUNTER — LAB VISIT (OUTPATIENT)
Dept: LAB | Facility: OTHER | Age: 64
End: 2024-09-06
Attending: INTERNAL MEDICINE
Payer: COMMERCIAL

## 2024-09-06 VITALS
SYSTOLIC BLOOD PRESSURE: 139 MMHG | BODY MASS INDEX: 27.32 KG/M2 | WEIGHT: 179.69 LBS | DIASTOLIC BLOOD PRESSURE: 67 MMHG | RESPIRATION RATE: 16 BRPM | HEART RATE: 67 BPM | TEMPERATURE: 98 F | OXYGEN SATURATION: 97 %

## 2024-09-06 DIAGNOSIS — C90.01 MULTIPLE MYELOMA IN REMISSION: Primary | ICD-10-CM

## 2024-09-06 DIAGNOSIS — C90.01 MULTIPLE MYELOMA IN REMISSION: ICD-10-CM

## 2024-09-06 LAB
ALBUMIN SERPL BCP-MCNC: 3.3 G/DL (ref 3.5–5.2)
ALP SERPL-CCNC: 59 U/L (ref 55–135)
ALT SERPL W/O P-5'-P-CCNC: 29 U/L (ref 10–44)
ANION GAP SERPL CALC-SCNC: 8 MMOL/L (ref 8–16)
AST SERPL-CCNC: 25 U/L (ref 10–40)
BASOPHILS # BLD AUTO: 0.02 K/UL (ref 0–0.2)
BASOPHILS NFR BLD: 0.5 % (ref 0–1.9)
BILIRUB SERPL-MCNC: 1 MG/DL (ref 0.1–1)
BUN SERPL-MCNC: 14 MG/DL (ref 8–23)
CALCIUM SERPL-MCNC: 9.1 MG/DL (ref 8.7–10.5)
CHLORIDE SERPL-SCNC: 105 MMOL/L (ref 95–110)
CO2 SERPL-SCNC: 26 MMOL/L (ref 23–29)
CREAT SERPL-MCNC: 1 MG/DL (ref 0.5–1.4)
DIFFERENTIAL METHOD BLD: ABNORMAL
EOSINOPHIL # BLD AUTO: 0.1 K/UL (ref 0–0.5)
EOSINOPHIL NFR BLD: 1.8 % (ref 0–8)
ERYTHROCYTE [DISTWIDTH] IN BLOOD BY AUTOMATED COUNT: 15.4 % (ref 11.5–14.5)
EST. GFR  (NO RACE VARIABLE): >60 ML/MIN/1.73 M^2
GLUCOSE SERPL-MCNC: 104 MG/DL (ref 70–110)
HCT VFR BLD AUTO: 37.9 % (ref 40–54)
HGB BLD-MCNC: 12.7 G/DL (ref 14–18)
IGA SERPL-MCNC: 175 MG/DL (ref 40–350)
IGG SERPL-MCNC: 622 MG/DL (ref 650–1600)
IGM SERPL-MCNC: 12 MG/DL (ref 50–300)
IMM GRANULOCYTES # BLD AUTO: 0.01 K/UL (ref 0–0.04)
IMM GRANULOCYTES NFR BLD AUTO: 0.3 % (ref 0–0.5)
LYMPHOCYTES # BLD AUTO: 1.7 K/UL (ref 1–4.8)
LYMPHOCYTES NFR BLD: 41.5 % (ref 18–48)
MCH RBC QN AUTO: 31.7 PG (ref 27–31)
MCHC RBC AUTO-ENTMCNC: 33.5 G/DL (ref 32–36)
MCV RBC AUTO: 95 FL (ref 82–98)
MONOCYTES # BLD AUTO: 0.5 K/UL (ref 0.3–1)
MONOCYTES NFR BLD: 11.6 % (ref 4–15)
NEUTROPHILS # BLD AUTO: 1.8 K/UL (ref 1.8–7.7)
NEUTROPHILS NFR BLD: 44.3 % (ref 38–73)
NRBC BLD-RTO: 0 /100 WBC
PHOSPHATE SERPL-MCNC: 2.4 MG/DL (ref 2.7–4.5)
PLATELET # BLD AUTO: 153 K/UL (ref 150–450)
PMV BLD AUTO: 9.4 FL (ref 9.2–12.9)
POTASSIUM SERPL-SCNC: 3.7 MMOL/L (ref 3.5–5.1)
PROT SERPL-MCNC: 6.2 G/DL (ref 6–8.4)
RBC # BLD AUTO: 4.01 M/UL (ref 4.6–6.2)
SODIUM SERPL-SCNC: 139 MMOL/L (ref 136–145)
WBC # BLD AUTO: 3.98 K/UL (ref 3.9–12.7)

## 2024-09-06 PROCEDURE — 84100 ASSAY OF PHOSPHORUS: CPT | Performed by: INTERNAL MEDICINE

## 2024-09-06 PROCEDURE — 86334 IMMUNOFIX E-PHORESIS SERUM: CPT | Performed by: INTERNAL MEDICINE

## 2024-09-06 PROCEDURE — 80053 COMPREHEN METABOLIC PANEL: CPT | Performed by: INTERNAL MEDICINE

## 2024-09-06 PROCEDURE — 83521 IG LIGHT CHAINS FREE EACH: CPT | Performed by: INTERNAL MEDICINE

## 2024-09-06 PROCEDURE — 96401 CHEMO ANTI-NEOPL SQ/IM: CPT

## 2024-09-06 PROCEDURE — 85025 COMPLETE CBC W/AUTO DIFF WBC: CPT | Performed by: INTERNAL MEDICINE

## 2024-09-06 PROCEDURE — 84165 PROTEIN E-PHORESIS SERUM: CPT | Mod: 26,,, | Performed by: PATHOLOGY

## 2024-09-06 PROCEDURE — 84165 PROTEIN E-PHORESIS SERUM: CPT | Performed by: INTERNAL MEDICINE

## 2024-09-06 PROCEDURE — 86334 IMMUNOFIX E-PHORESIS SERUM: CPT | Mod: 26,,, | Performed by: PATHOLOGY

## 2024-09-06 PROCEDURE — 82784 ASSAY IGA/IGD/IGG/IGM EACH: CPT | Mod: 59 | Performed by: INTERNAL MEDICINE

## 2024-09-06 PROCEDURE — 63600175 PHARM REV CODE 636 W HCPCS: Mod: JZ,JG | Performed by: INTERNAL MEDICINE

## 2024-09-06 PROCEDURE — 36415 COLL VENOUS BLD VENIPUNCTURE: CPT | Performed by: INTERNAL MEDICINE

## 2024-09-06 RX ORDER — BORTEZOMIB 3.5 MG/1
1.3 INJECTION, POWDER, LYOPHILIZED, FOR SOLUTION INTRAVENOUS; SUBCUTANEOUS
Status: CANCELLED | OUTPATIENT
Start: 2024-09-06

## 2024-09-06 RX ORDER — SODIUM CHLORIDE 0.9 % (FLUSH) 0.9 %
10 SYRINGE (ML) INJECTION
Status: CANCELLED | OUTPATIENT
Start: 2024-09-06

## 2024-09-06 RX ORDER — HEPARIN 100 UNIT/ML
500 SYRINGE INTRAVENOUS
Status: CANCELLED | OUTPATIENT
Start: 2024-09-06

## 2024-09-06 RX ORDER — BORTEZOMIB 3.5 MG/1
1.3 INJECTION, POWDER, LYOPHILIZED, FOR SOLUTION INTRAVENOUS; SUBCUTANEOUS
Status: COMPLETED | OUTPATIENT
Start: 2024-09-06 | End: 2024-09-06

## 2024-09-06 RX ORDER — HEPARIN 100 UNIT/ML
500 SYRINGE INTRAVENOUS
Status: DISCONTINUED | OUTPATIENT
Start: 2024-09-06 | End: 2024-09-06 | Stop reason: HOSPADM

## 2024-09-06 RX ORDER — SODIUM CHLORIDE 0.9 % (FLUSH) 0.9 %
10 SYRINGE (ML) INJECTION
Status: DISCONTINUED | OUTPATIENT
Start: 2024-09-06 | End: 2024-09-06 | Stop reason: HOSPADM

## 2024-09-06 RX ADMIN — BORTEZOMIB 2.5 MG: 3.5 INJECTION, POWDER, LYOPHILIZED, FOR SOLUTION INTRAVENOUS; SUBCUTANEOUS at 09:09

## 2024-09-06 NOTE — PLAN OF CARE
Problem: Adult Inpatient Plan of Care  Goal: Plan of Care Review  Outcome: Progressing  Goal: Patient-Specific Goal (Individualized)  Outcome: Progressing     Problem: Nausea and Vomiting (Chemotherapy Effects)  Goal: Fluid and Electrolyte Balance  Outcome: Progressing       Patient arrived to clinic today for velcade injection. VS and assessment completed with no acute issues noted. Injection given and band aid applied. All questions answered and left clinic ambulatory in NAD.

## 2024-09-09 LAB
ALBUMIN SERPL ELPH-MCNC: 3.36 G/DL (ref 3.35–5.55)
ALPHA1 GLOB SERPL ELPH-MCNC: 0.29 G/DL (ref 0.17–0.41)
ALPHA2 GLOB SERPL ELPH-MCNC: 0.67 G/DL (ref 0.43–0.99)
B-GLOBULIN SERPL ELPH-MCNC: 0.79 G/DL (ref 0.5–1.1)
GAMMA GLOB SERPL ELPH-MCNC: 0.59 G/DL (ref 0.67–1.58)
INTERPRETATION SERPL IFE-IMP: NORMAL
KAPPA LC SER QL IA: 1.3 MG/DL (ref 0.33–1.94)
KAPPA LC/LAMBDA SER IA: 1.23 (ref 0.26–1.65)
LAMBDA LC SER QL IA: 1.06 MG/DL (ref 0.57–2.63)
PATHOLOGIST INTERPRETATION IFE: NORMAL
PATHOLOGIST INTERPRETATION SPE: NORMAL
PROT SERPL-MCNC: 5.7 G/DL (ref 6–8.4)

## 2024-09-13 ENCOUNTER — PATIENT MESSAGE (OUTPATIENT)
Dept: INFUSION THERAPY | Facility: OTHER | Age: 64
End: 2024-09-13
Payer: COMMERCIAL

## 2024-09-13 DIAGNOSIS — C90.01 MULTIPLE MYELOMA IN REMISSION: ICD-10-CM

## 2024-09-13 RX ORDER — LENALIDOMIDE 10 MG/1
1 CAPSULE ORAL DAILY
Qty: 21 EACH | Refills: 0 | Status: SHIPPED | OUTPATIENT
Start: 2024-09-13

## 2024-09-13 NOTE — TELEPHONE ENCOUNTER
----- Message from Sylvie Schulz sent at 9/13/2024  8:11 AM CDT -----  Regarding: Rx refill  Contact: Vicente  Regarding: Rx refill        Name Of Caller: Vicente Connors          Contact Preference: [phone] (home)        Nature of call:  pt is calling to have the following medication refilled.      REVLIMID 10 mg Cap    Pharmacy:   [address] [address]  Phone: [phone] Fax: [phone]      Note : please include new Authorization number.

## 2024-09-23 ENCOUNTER — TELEPHONE (OUTPATIENT)
Dept: HEMATOLOGY/ONCOLOGY | Facility: CLINIC | Age: 64
End: 2024-09-23
Payer: COMMERCIAL

## 2024-09-23 NOTE — TELEPHONE ENCOUNTER
----- Message from Cleo Reynolds sent at 9/23/2024  9:20 AM CDT -----  Regarding: R/S Appt  Contact: ELEAZAR DIEZ [[id number]]  RESCHEDULE/APPTS    Current Appt Date:  10/01/2024    Type of Appt:  Labs and EP    Physician:  Berna    Reason for Scheduling:  Pt is requesting appts be r/s to the week of 10/07/2024    Caller:  ELEAZAR DIEZ [[id number]]    Contact Preference:  [phone] (home)

## 2024-09-23 NOTE — TELEPHONE ENCOUNTER
----- Message from Cleo Reynolds sent at 9/23/2024  9:20 AM CDT -----  Regarding: R/S Appt  Contact: ELEAZAR IDEZ [[id number]]  RESCHEDULE/APPTS    Current Appt Date:  10/01/2024    Type of Appt:  Labs and EP    Physician:  Berna    Reason for Scheduling:  Pt is requesting appts be r/s to the week of 10/07/2024    Caller:  ELEAZAR DIEZ [[id number]]    Contact Preference:  [phone] (home)

## 2024-09-25 DIAGNOSIS — K11.7 XEROSTOMIA: ICD-10-CM

## 2024-09-25 DIAGNOSIS — G62.9 PERIPHERAL POLYNEUROPATHY: Primary | ICD-10-CM

## 2024-09-25 DIAGNOSIS — C90.01 MULTIPLE MYELOMA IN REMISSION: ICD-10-CM

## 2024-10-03 ENCOUNTER — OFFICE VISIT (OUTPATIENT)
Dept: HEMATOLOGY/ONCOLOGY | Facility: CLINIC | Age: 64
End: 2024-10-03
Payer: COMMERCIAL

## 2024-10-03 ENCOUNTER — LAB VISIT (OUTPATIENT)
Dept: LAB | Facility: OTHER | Age: 64
End: 2024-10-03
Attending: INTERNAL MEDICINE
Payer: COMMERCIAL

## 2024-10-03 ENCOUNTER — INFUSION (OUTPATIENT)
Dept: INFUSION THERAPY | Facility: HOSPITAL | Age: 64
End: 2024-10-03
Attending: INTERNAL MEDICINE
Payer: COMMERCIAL

## 2024-10-03 VITALS
TEMPERATURE: 98 F | WEIGHT: 185.94 LBS | OXYGEN SATURATION: 96 % | BODY MASS INDEX: 28.18 KG/M2 | SYSTOLIC BLOOD PRESSURE: 130 MMHG | DIASTOLIC BLOOD PRESSURE: 71 MMHG | HEIGHT: 68 IN | HEART RATE: 76 BPM | RESPIRATION RATE: 18 BRPM

## 2024-10-03 DIAGNOSIS — C90.01 MULTIPLE MYELOMA IN REMISSION: Primary | ICD-10-CM

## 2024-10-03 DIAGNOSIS — K11.7 XEROSTOMIA: ICD-10-CM

## 2024-10-03 DIAGNOSIS — Z79.01 CURRENT USE OF LONG TERM ANTICOAGULATION: Primary | ICD-10-CM

## 2024-10-03 DIAGNOSIS — C90.01 MULTIPLE MYELOMA IN REMISSION: ICD-10-CM

## 2024-10-03 DIAGNOSIS — G62.9 PERIPHERAL POLYNEUROPATHY: ICD-10-CM

## 2024-10-03 DIAGNOSIS — R21 RASH AND OTHER NONSPECIFIC SKIN ERUPTION: Primary | ICD-10-CM

## 2024-10-03 LAB
ALBUMIN SERPL BCP-MCNC: 3.5 G/DL (ref 3.5–5.2)
ALP SERPL-CCNC: 64 U/L (ref 55–135)
ALT SERPL W/O P-5'-P-CCNC: 26 U/L (ref 10–44)
ANION GAP SERPL CALC-SCNC: 12 MMOL/L (ref 8–16)
AST SERPL-CCNC: 30 U/L (ref 10–40)
BASOPHILS # BLD AUTO: 0.03 K/UL (ref 0–0.2)
BASOPHILS NFR BLD: 0.8 % (ref 0–1.9)
BILIRUB SERPL-MCNC: 1.7 MG/DL (ref 0.1–1)
BUN SERPL-MCNC: 11 MG/DL (ref 8–23)
CALCIUM SERPL-MCNC: 8.8 MG/DL (ref 8.7–10.5)
CHLORIDE SERPL-SCNC: 102 MMOL/L (ref 95–110)
CO2 SERPL-SCNC: 25 MMOL/L (ref 23–29)
CREAT SERPL-MCNC: 1 MG/DL (ref 0.5–1.4)
DIFFERENTIAL METHOD BLD: ABNORMAL
EOSINOPHIL # BLD AUTO: 0.1 K/UL (ref 0–0.5)
EOSINOPHIL NFR BLD: 3.5 % (ref 0–8)
ERYTHROCYTE [DISTWIDTH] IN BLOOD BY AUTOMATED COUNT: 16.2 % (ref 11.5–14.5)
EST. GFR  (NO RACE VARIABLE): >60 ML/MIN/1.73 M^2
GLUCOSE SERPL-MCNC: 105 MG/DL (ref 70–110)
HCT VFR BLD AUTO: 39.2 % (ref 40–54)
HGB BLD-MCNC: 13.2 G/DL (ref 14–18)
IGA SERPL-MCNC: 161 MG/DL (ref 40–350)
IGG SERPL-MCNC: 609 MG/DL (ref 650–1600)
IGM SERPL-MCNC: 10 MG/DL (ref 50–300)
IMM GRANULOCYTES # BLD AUTO: 0.02 K/UL (ref 0–0.04)
IMM GRANULOCYTES NFR BLD AUTO: 0.5 % (ref 0–0.5)
LYMPHOCYTES # BLD AUTO: 1.3 K/UL (ref 1–4.8)
LYMPHOCYTES NFR BLD: 34.7 % (ref 18–48)
MCH RBC QN AUTO: 31.7 PG (ref 27–31)
MCHC RBC AUTO-ENTMCNC: 33.7 G/DL (ref 32–36)
MCV RBC AUTO: 94 FL (ref 82–98)
MONOCYTES # BLD AUTO: 0.6 K/UL (ref 0.3–1)
MONOCYTES NFR BLD: 15.7 % (ref 4–15)
NEUTROPHILS # BLD AUTO: 1.7 K/UL (ref 1.8–7.7)
NEUTROPHILS NFR BLD: 44.8 % (ref 38–73)
NRBC BLD-RTO: 0 /100 WBC
PHOSPHATE SERPL-MCNC: 3.2 MG/DL (ref 2.7–4.5)
PLATELET # BLD AUTO: 156 K/UL (ref 150–450)
PMV BLD AUTO: 9.3 FL (ref 9.2–12.9)
POTASSIUM SERPL-SCNC: 3.2 MMOL/L (ref 3.5–5.1)
PROT SERPL-MCNC: 6.3 G/DL (ref 6–8.4)
RBC # BLD AUTO: 4.17 M/UL (ref 4.6–6.2)
SODIUM SERPL-SCNC: 139 MMOL/L (ref 136–145)
WBC # BLD AUTO: 3.69 K/UL (ref 3.9–12.7)

## 2024-10-03 PROCEDURE — G2211 COMPLEX E/M VISIT ADD ON: HCPCS | Mod: S$GLB,,, | Performed by: INTERNAL MEDICINE

## 2024-10-03 PROCEDURE — 84100 ASSAY OF PHOSPHORUS: CPT | Performed by: INTERNAL MEDICINE

## 2024-10-03 PROCEDURE — 85025 COMPLETE CBC W/AUTO DIFF WBC: CPT | Performed by: INTERNAL MEDICINE

## 2024-10-03 PROCEDURE — 83521 IG LIGHT CHAINS FREE EACH: CPT | Mod: 59 | Performed by: INTERNAL MEDICINE

## 2024-10-03 PROCEDURE — 86334 IMMUNOFIX E-PHORESIS SERUM: CPT | Mod: 26,,, | Performed by: PATHOLOGY

## 2024-10-03 PROCEDURE — 36415 COLL VENOUS BLD VENIPUNCTURE: CPT | Performed by: INTERNAL MEDICINE

## 2024-10-03 PROCEDURE — 99999 PR PBB SHADOW E&M-EST. PATIENT-LVL IV: CPT | Mod: PBBFAC,,, | Performed by: INTERNAL MEDICINE

## 2024-10-03 PROCEDURE — 63600175 PHARM REV CODE 636 W HCPCS: Mod: JG | Performed by: INTERNAL MEDICINE

## 2024-10-03 PROCEDURE — 3008F BODY MASS INDEX DOCD: CPT | Mod: CPTII,S$GLB,, | Performed by: INTERNAL MEDICINE

## 2024-10-03 PROCEDURE — 86334 IMMUNOFIX E-PHORESIS SERUM: CPT | Performed by: INTERNAL MEDICINE

## 2024-10-03 PROCEDURE — 80053 COMPREHEN METABOLIC PANEL: CPT | Performed by: INTERNAL MEDICINE

## 2024-10-03 PROCEDURE — 84165 PROTEIN E-PHORESIS SERUM: CPT | Mod: 26,,, | Performed by: PATHOLOGY

## 2024-10-03 PROCEDURE — 82784 ASSAY IGA/IGD/IGG/IGM EACH: CPT | Mod: 59 | Performed by: INTERNAL MEDICINE

## 2024-10-03 PROCEDURE — 84165 PROTEIN E-PHORESIS SERUM: CPT | Performed by: INTERNAL MEDICINE

## 2024-10-03 PROCEDURE — 1101F PT FALLS ASSESS-DOCD LE1/YR: CPT | Mod: CPTII,S$GLB,, | Performed by: INTERNAL MEDICINE

## 2024-10-03 PROCEDURE — 96401 CHEMO ANTI-NEOPL SQ/IM: CPT

## 2024-10-03 PROCEDURE — 3075F SYST BP GE 130 - 139MM HG: CPT | Mod: CPTII,S$GLB,, | Performed by: INTERNAL MEDICINE

## 2024-10-03 PROCEDURE — 1159F MED LIST DOCD IN RCRD: CPT | Mod: CPTII,S$GLB,, | Performed by: INTERNAL MEDICINE

## 2024-10-03 PROCEDURE — 3078F DIAST BP <80 MM HG: CPT | Mod: CPTII,S$GLB,, | Performed by: INTERNAL MEDICINE

## 2024-10-03 PROCEDURE — 99215 OFFICE O/P EST HI 40 MIN: CPT | Mod: S$GLB,,, | Performed by: INTERNAL MEDICINE

## 2024-10-03 PROCEDURE — 3288F FALL RISK ASSESSMENT DOCD: CPT | Mod: CPTII,S$GLB,, | Performed by: INTERNAL MEDICINE

## 2024-10-03 RX ORDER — BORTEZOMIB 3.5 MG/1
1.3 INJECTION, POWDER, LYOPHILIZED, FOR SOLUTION INTRAVENOUS; SUBCUTANEOUS
Status: COMPLETED | OUTPATIENT
Start: 2024-10-03 | End: 2024-10-03

## 2024-10-03 RX ORDER — SODIUM CHLORIDE 0.9 % (FLUSH) 0.9 %
10 SYRINGE (ML) INJECTION
Status: DISCONTINUED | OUTPATIENT
Start: 2024-10-03 | End: 2024-10-03 | Stop reason: HOSPADM

## 2024-10-03 RX ORDER — BORTEZOMIB 3.5 MG/1
1.3 INJECTION, POWDER, LYOPHILIZED, FOR SOLUTION INTRAVENOUS; SUBCUTANEOUS
Status: CANCELLED | OUTPATIENT
Start: 2024-10-04

## 2024-10-03 RX ORDER — SODIUM CHLORIDE 0.9 % (FLUSH) 0.9 %
10 SYRINGE (ML) INJECTION
Status: CANCELLED | OUTPATIENT
Start: 2024-10-04

## 2024-10-03 RX ORDER — HEPARIN 100 UNIT/ML
500 SYRINGE INTRAVENOUS
Status: CANCELLED | OUTPATIENT
Start: 2024-10-04

## 2024-10-03 RX ORDER — LENALIDOMIDE 10 MG/1
1 CAPSULE ORAL DAILY
Qty: 21 EACH | Refills: 0 | Status: SHIPPED | OUTPATIENT
Start: 2024-10-03

## 2024-10-03 RX ORDER — HEPARIN 100 UNIT/ML
500 SYRINGE INTRAVENOUS
Status: DISCONTINUED | OUTPATIENT
Start: 2024-10-03 | End: 2024-10-03 | Stop reason: HOSPADM

## 2024-10-03 RX ADMIN — BORTEZOMIB 2.5 MG: 3.5 INJECTION, POWDER, LYOPHILIZED, FOR SOLUTION INTRAVENOUS; SUBCUTANEOUS at 04:10

## 2024-10-03 NOTE — NURSING
Pt here for velcade injection. OK to treat per . tolerated injection to ABD tissue. D/C in stable condition.

## 2024-10-03 NOTE — PROGRESS NOTES
-please schedule bone marrow biopsy in clinic in next 4  weeks  -etienne VILLALPANDO Appt 1 week after marrow biopsy; ok to double book   -velcade 10/17, 10/31, 11/14 at [facility]   -cbc, cmp, serum free light chains, quantitative immunoglobulins, serum electropheresis, serum immunofixation lab prior to treatment on 10/31

## 2024-10-03 NOTE — PROGRESS NOTES
Chief Complaint : Multiple myeloma, s/p tandem auto SCT, on VRd maintenance, hematology follow up      History of Present Illness : Vicente Connors is a 63 y.o. male here for hematology follow up. He has recently moved to [place] from [place]. He has history of stage IIIA, [employer] stage II IgA kappa multiple myeloma, which is likely intermediate-risk based upon the presence of t(4;14) with hyperdiploidy, based on records available through care everywhere.    IgA Kappa multiple Myeloma was diagnosed in 2014.   He was started on velcade, revlimid and dexamethasone at diagnosis.   Of note, he developed a popliteal DVT and was started on Lovenox and later switched Xarelto.   He completed a stem cell transplant with Dr Schuster in May 2015. He is s/p  tandem autologous transplant in 2015 and has been on triple maintenance with bortezomib, lenalidomide and dexamethasone since then.   He also has peripheral neuropathy, h/o LE DVT on chronic anti-coagulation, GERD, vertebral fracture. He has history of DVT and remains on chronic anticoagulation.  He has had multiple skeletal complications since his original diagnosis.  He had prolonged bout of diarrhea in January 2023. All stool studies were negative/ unremarkable.  He had COVID 19 infection in March 2023. He received paxlovid  MRI spine in March 2023 showed compression fracture of L1 with retropulsion of the posterior fragment as well as minimal compression fractures of the superior endplates of T8 and T11. MRI pelvis with facet arthropathy in the lower lumbar spine.  Hip joints exhibiting bilateral chondral thinning with labral fraying and osteophyte production.     Interval History: He is here for a follow up visit. He is doing well. His back pain is chronic, stable.     Review of patient's allergies indicates:  No Known Allergies        Current Outpatient Medications   Medication Sig    acyclovir (ZOVIRAX) 400 MG tablet Take 1 tablet (400 mg total) by  mouth 2 (two) times daily.    bortezomib (VELCADE INJ) Inject as directed. Pt gets every month    calcium carb/vit D3/minerals (CALCIUM-VITAMIN D ORAL)     dexAMETHasone (DECADRON) 4 MG Tab TAKE 10 TABLETS (40 MG DOSE) BY MOUTH DAY OF AND DAY AFTER VELCADE once monthly    folic acid-vit B6-vit B12 2.5-25-2 mg (FOLBIC) 2.5-25-2 mg Tab Take 1 tablet by mouth once daily.    hydroCHLOROthiazide (HYDRODIURIL) 25 MG tablet Take 1 tablet (25 mg total) by mouth once daily.    magnesium oxide 500 mg Tab once daily.    multivitamin with minerals (MULTI-VITAMIN W/MINERALS ORAL)     omeprazole (PRILOSEC) 20 MG capsule     REVLIMID 10 mg Cap Take 1 capsule by mouth once daily TAKE 1 CAPSULE ONCE DAILY ON DAYS 1 THROUGH 21 OF A 28 DAY CYCLE. Auth [id number]  9/13/24.    rivaroxaban (XARELTO) 15 mg Tab Take 1 tablet (15 mg total) by mouth once daily.    sulfamethoxazole-trimethoprim 800-160mg (BACTRIM DS) 800-160 mg Tab TAKE 1 TABLET EVERY MONDAY, WEDNESDAY AND FRIDAY    zoledronic acid (ZOMETA IV) Inject into the vein. Pt gets every 3 months    lenalidomide 10 mg Cap Take 1 capsule by mouth once daily Auth [id number] 6/12/24 BRAND NAME ONLY.    lenalidomide 10 mg Cap Take 1 capsule by mouth As instructed.     No current facility-administered medications for this visit.      Review of Systems   Constitutional:  Positive for malaise/fatigue. Negative for chills, fever and weight loss.   HENT:  Negative for congestion, ear pain and sinus pain.    Eyes:  Negative for double vision, photophobia and pain.   Respiratory:  Negative for sputum production and stridor.    Cardiovascular:  Negative for chest pain, palpitations, orthopnea and claudication.   Gastrointestinal:  Negative for blood in stool, constipation, diarrhea, melena and nausea.   Genitourinary:  Negative for dysuria, frequency and urgency.   Musculoskeletal:  Negative for myalgias and neck pain.   Neurological:  Positive for tingling. Negative for dizziness, tremors and speech  change.   Endo/Heme/Allergies:  Negative for environmental allergies. Does not bruise/bleed easily.   Psychiatric/Behavioral:  Negative for substance abuse and suicidal ideas.           Vitals:    10/03/24 1435   BP: 130/71   Pulse: 76   Resp: 18   Temp: 98.3 °F (36.8 °C)         PS: ECOG 1    Physical Exam  HENT:      Head: Normocephalic and atraumatic.      Mouth/Throat:      Pharynx: No posterior oropharyngeal erythema.   Eyes:      General: No scleral icterus.  Cardiovascular:      Rate and Rhythm: Normal rate and regular rhythm.   Pulmonary:      Effort: Pulmonary effort is normal. No respiratory distress.      Breath sounds: Normal breath sounds.   Abdominal:      General: There is no distension.      Palpations: There is no mass.      Tenderness: There is no abdominal tenderness.   Musculoskeletal:         General: No swelling.   Lymphadenopathy:      Cervical: No cervical adenopathy.   Neurological:      General: No focal deficit present.      Mental Status: He is alert and oriented to person, place, and time.      Cranial Nerves: No cranial nerve deficit.          Lab Results   Component Value Date    WBC 3.69 (L) 10/03/2024    HGB 13.2 (L) 10/03/2024    HCT 39.2 (L) 10/03/2024    MCV 94 10/03/2024     10/03/2024       Gran # (ANC)   Date Value Ref Range Status   10/03/2024 1.7 (L) 1.8 - 7.7 K/uL Final     Gran %   Date Value Ref Range Status   10/03/2024 44.8 38.0 - 73.0 % Final     CMP  Sodium   Date Value Ref Range Status   10/03/2024 139 136 - 145 mmol/L Final     Potassium   Date Value Ref Range Status   10/03/2024 3.2 (L) 3.5 - 5.1 mmol/L Final     Chloride   Date Value Ref Range Status   10/03/2024 102 95 - 110 mmol/L Final     CO2   Date Value Ref Range Status   10/03/2024 25 23 - 29 mmol/L Final     Glucose   Date Value Ref Range Status   10/03/2024 105 70 - 110 mg/dL Final     BUN   Date Value Ref Range Status   10/03/2024 11 8 - 23 mg/dL Final     Creatinine   Date Value Ref Range Status    10/03/2024 1.0 0.5 - 1.4 mg/dL Final     Calcium   Date Value Ref Range Status   10/03/2024 8.8 8.7 - 10.5 mg/dL Final     Total Protein   Date Value Ref Range Status   10/03/2024 6.3 6.0 - 8.4 g/dL Final     Albumin   Date Value Ref Range Status   10/03/2024 3.5 3.5 - 5.2 g/dL Final     Total Bilirubin   Date Value Ref Range Status   10/03/2024 1.7 (H) 0.1 - 1.0 mg/dL Final     Comment:     For infants and newborns, interpretation of results should be based  on gestational age, weight and in agreement with clinical  observations.    Premature Infant recommended reference ranges:  Up to 24 hours.............<8.0 mg/dL  Up to 48 hours............<12.0 mg/dL  3-5 days..................<15.0 mg/dL  6-29 days.................<15.0 mg/dL       Alkaline Phosphatase   Date Value Ref Range Status   10/03/2024 64 55 - 135 U/L Final     AST   Date Value Ref Range Status   10/03/2024 30 10 - 40 U/L Final     ALT   Date Value Ref Range Status   10/03/2024 26 10 - 44 U/L Final     Anion Gap   Date Value Ref Range Status   10/03/2024 12 8 - 16 mmol/L Final     eGFR   Date Value Ref Range Status   10/03/2024 >60 >60 mL/min/1.73 m^2 Final     IgG   Date Value Ref Range Status   10/03/2024 609 (L) 650 - 1600 mg/dL Final     Comment:     IgG Cord Blood Reference Range: 650-1600 mg/dL.     IgA   Date Value Ref Range Status   10/03/2024 161 40 - 350 mg/dL Final     Comment:     IgA Cord Blood Reference Range: <5 mg/dL.     IgM   Date Value Ref Range Status   10/03/2024 10 (L) 50 - 300 mg/dL Final     Comment:     IgM Cord Blood Reference Range: <25 mg/dL.     Kappa Free Light Chains   Date Value Ref Range Status   10/03/2024 1.54 0.33 - 1.94 mg/dL Final   09/06/2024 1.30 0.33 - 1.94 mg/dL Final   07/12/2024 1.91 0.33 - 1.94 mg/dL Final     Lambda Free Light Chains   Date Value Ref Range Status   10/03/2024 1.03 0.57 - 2.63 mg/dL Final   09/06/2024 1.06 0.57 - 2.63 mg/dL Final   07/12/2024 1.34 0.57 - 2.63 mg/dL Final     Kappa/Lambda  FLC Ratio   Date Value Ref Range Status   10/03/2024 1.50 0.26 - 1.65 Final     Comment:     Undetected antigen excess is a rare event but cannot   be excluded. If these free light chain results do not   agree with other clinical or laboratory findings or   if the sample is from a patient that has previously   demonstrated antigen excess, discuss with the testing   laboratory.   Results should always be interpreted in conjunction   with other laboratory tests and clinical evidence.     09/06/2024 1.23 0.26 - 1.65 Final     Comment:     Undetected antigen excess is a rare event but cannot   be excluded. If these free light chain results do not   agree with other clinical or laboratory findings or   if the sample is from a patient that has previously   demonstrated antigen excess, discuss with the testing   laboratory.   Results should always be interpreted in conjunction   with other laboratory tests and clinical evidence.     07/12/2024 1.43 0.26 - 1.65 Final     Comment:     Undetected antigen excess is a rare event but cannot   be excluded. If these free light chain results do not   agree with other clinical or laboratory findings or   if the sample is from a patient that has previously   demonstrated antigen excess, discuss with the testing   laboratory.   Results should always be interpreted in conjunction   with other laboratory tests and clinical evidence.       Pathologist Interpretation SPE   Date Value Ref Range Status   10/03/2024 REVIEWED  Final     Comment:       Electronically reviewed and signed by:  Santos Chaves MD  Signed on 10/07/24 at 14:05  Decreased total protein.  Decreased gamma globulin.     09/06/2024 REVIEWED  Final     Comment:       Electronically reviewed and signed by:  Shima Fernando MD  Signed on 09/09/24 at 14:43  Decreased total protein.  Decreased gamma globulins.    No paraprotein band identified.     07/12/2024 REVIEWED  Final     Comment:       Electronically reviewed  and signed by:  Shima Fernando MD  Signed on 07/15/24 at 13:40  Normal total protein, normal pattern.       Pathologist Interpretation JACQUELIN   Date Value Ref Range Status   10/03/2024 REVIEWED  Final     Comment:       Electronically reviewed and signed by:  Santos Chaves MD  Signed on 10/07/24 at 14:05  No monoclonal peaks identified.          Assessment:    1. IgA kappa multiple myeloma in remission     -Vicente Connors is a 64 y.o. y.o.-year-old male with history of stage IIIA, ISS II IgA kappa multiple myeloma, which is likely intermediate-risk based upon the presence of t(4;14) with hyperdiploidy   -S/ p  tandem auto stem cell transplant in 2015 with a good biochemical remission  -Now on triple maintenance: velcade 1.3 mg/m2 sub q monthly, dex 40 mg PO day of and day after monthly velcade , revlimid 10 mg  PO for 21 days on / 7 days off every 28 days  -- his 10/3/24 biochemical studies/labs cw with CR and no CRAB  -we discussed systemic restaging  to reassess MRD status for possible therapy deescalation in light of almost 10 years since SCT; he is in agreement     2. Normocytic anemia    --grade 1   -likely secondary to chemotherapy    3. Leukopenia    --Likely secondary to chemotherapy      4. Absolute neutropenia    --Likely secondary to chemotherapy  -Afebrile; ANC 1.78 on 6/10/24    5. Thrombocytopenia    -Mild,asymptomatic  -Platelet count 152k on 6/10/24        6. Low Back Pain: chronic and unchanged; not on any analgesic at this time    --Spine MRI done 1/21/21, result detailed above under imaging    7. History of LE DVT:     --Continue Xarelto   --Take with largest meal of the day  --Notify office of any scheduled procedure/surgery as Xarelto will need to be interrupted   -no bleeding diathesis reported    8. Infectious disease      --Continue prophylactic bactrim and acyclovir   --recommend annual flu vaccine, COVID 19 booster      9. Bone health:     --MRI spine1/21 neg   --Repeat MRI spine  and pelvis in March 2023 did not demonstrate any new lesions  --Last Zometa given 4/15/24, q3 months  -he has pain in his right lower jaw today, will hold zometa until he sees his dentist    10. Diarrhea    --now improved  -stool studies neagtive in Jan 2023    11. Health Maintenance:     --Colonoscopy 2011 and 2017, repeat 2022   --PSA  up to date   --covid vaccine 3/31 pfizer booster done  --Got second booster 2/17/22    -recommend COVID booster     FU:  -please schedule bone marrow biopsy in clinic in next 4  weeks  -virtual MD Appt 1 week after marrow biopsy; ok to double book   -velcade 10/17, 10/31, 11/14 at [facility]   -cbc, cmp, serum free light chains, quantitative immunoglobulins, serum electropheresis, serum immunofixation lab prior to treatment on 10/31

## 2024-10-04 ENCOUNTER — TELEPHONE (OUTPATIENT)
Dept: INTERNAL MEDICINE | Facility: CLINIC | Age: 64
End: 2024-10-04
Payer: COMMERCIAL

## 2024-10-04 LAB
ALBUMIN SERPL ELPH-MCNC: 3.62 G/DL (ref 3.35–5.55)
ALPHA1 GLOB SERPL ELPH-MCNC: 0.27 G/DL (ref 0.17–0.41)
ALPHA2 GLOB SERPL ELPH-MCNC: 0.62 G/DL (ref 0.43–0.99)
B-GLOBULIN SERPL ELPH-MCNC: 0.8 G/DL (ref 0.5–1.1)
GAMMA GLOB SERPL ELPH-MCNC: 0.6 G/DL (ref 0.67–1.58)
INTERPRETATION SERPL IFE-IMP: NORMAL
KAPPA LC SER QL IA: 1.54 MG/DL (ref 0.33–1.94)
KAPPA LC/LAMBDA SER IA: 1.5 (ref 0.26–1.65)
LAMBDA LC SER QL IA: 1.03 MG/DL (ref 0.57–2.63)
PROT SERPL-MCNC: 5.9 G/DL (ref 6–8.4)

## 2024-10-07 ENCOUNTER — PATIENT MESSAGE (OUTPATIENT)
Dept: HEMATOLOGY/ONCOLOGY | Facility: CLINIC | Age: 64
End: 2024-10-07
Payer: COMMERCIAL

## 2024-10-07 DIAGNOSIS — C90.01 MULTIPLE MYELOMA IN REMISSION: ICD-10-CM

## 2024-10-07 LAB
PATHOLOGIST INTERPRETATION IFE: NORMAL
PATHOLOGIST INTERPRETATION SPE: NORMAL

## 2024-10-08 RX ORDER — LENALIDOMIDE 10 MG/1
1 CAPSULE ORAL DAILY
Qty: 21 EACH | Refills: 0 | Status: SHIPPED | OUTPATIENT
Start: 2024-10-08

## 2024-10-10 ENCOUNTER — TELEPHONE (OUTPATIENT)
Dept: HEMATOLOGY/ONCOLOGY | Facility: CLINIC | Age: 64
End: 2024-10-10
Payer: COMMERCIAL

## 2024-10-10 ENCOUNTER — PATIENT MESSAGE (OUTPATIENT)
Dept: HEMATOLOGY/ONCOLOGY | Facility: CLINIC | Age: 64
End: 2024-10-10
Payer: COMMERCIAL

## 2024-10-10 RX ORDER — TRIAMCINOLONE ACETONIDE 1 MG/G
OINTMENT TOPICAL 2 TIMES DAILY
Qty: 15 G | Refills: 2 | Status: SHIPPED | OUTPATIENT
Start: 2024-10-10

## 2024-10-10 NOTE — TELEPHONE ENCOUNTER
Spoke with patient regarding his rash. Pt stated that his rash began yesterday afternoon. Pt denied burning but stated that he has mild itching.  Pt stated that he does not usually take benadryl or zyrtec and does not take anything for allergies.  stated that the rash is on the  front and back of his torso and that it is concentrated under his arms. I requested that the patient send a picture of the rash through the patient portal and we would be in touch with next steps. Pt verbalized agreement and understanding.

## 2024-10-10 NOTE — TELEPHONE ENCOUNTER
----- Message from Lucy sent at 10/10/2024  9:03 AM CDT -----  Regarding: Experiencing Symptoms  Contact: Pt @[phone]  Pt is calling to speak to someone in the office to discuss symptoms they are having. Pt is asking for a call back today. Please call to advise. Thanks.         Symptoms: Rash on upper body         How long has patient had these symptoms: yesterday

## 2024-10-21 ENCOUNTER — IMMUNIZATION (OUTPATIENT)
Dept: INTERNAL MEDICINE | Facility: CLINIC | Age: 64
End: 2024-10-21
Payer: COMMERCIAL

## 2024-10-21 ENCOUNTER — OFFICE VISIT (OUTPATIENT)
Dept: INTERNAL MEDICINE | Facility: CLINIC | Age: 64
End: 2024-10-21
Payer: COMMERCIAL

## 2024-10-21 ENCOUNTER — PATIENT MESSAGE (OUTPATIENT)
Dept: INTERNAL MEDICINE | Facility: CLINIC | Age: 64
End: 2024-10-21

## 2024-10-21 ENCOUNTER — PATIENT MESSAGE (OUTPATIENT)
Dept: HEMATOLOGY/ONCOLOGY | Facility: CLINIC | Age: 64
End: 2024-10-21
Payer: COMMERCIAL

## 2024-10-21 VITALS
DIASTOLIC BLOOD PRESSURE: 70 MMHG | WEIGHT: 181.44 LBS | HEIGHT: 68 IN | OXYGEN SATURATION: 96 % | HEART RATE: 68 BPM | SYSTOLIC BLOOD PRESSURE: 130 MMHG | BODY MASS INDEX: 27.5 KG/M2

## 2024-10-21 DIAGNOSIS — Z13.6 ENCOUNTER FOR SCREENING FOR CARDIOVASCULAR DISORDERS: ICD-10-CM

## 2024-10-21 DIAGNOSIS — Z00.00 ROUTINE GENERAL MEDICAL EXAMINATION AT A HEALTH CARE FACILITY: Primary | ICD-10-CM

## 2024-10-21 DIAGNOSIS — Z23 NEED FOR VACCINATION: Primary | ICD-10-CM

## 2024-10-21 PROCEDURE — 99999 PR PBB SHADOW E&M-EST. PATIENT-LVL V: CPT | Mod: PBBFAC,,, | Performed by: INTERNAL MEDICINE

## 2024-10-21 PROCEDURE — 3008F BODY MASS INDEX DOCD: CPT | Mod: CPTII,S$GLB,, | Performed by: INTERNAL MEDICINE

## 2024-10-21 PROCEDURE — 3288F FALL RISK ASSESSMENT DOCD: CPT | Mod: CPTII,S$GLB,, | Performed by: INTERNAL MEDICINE

## 2024-10-21 PROCEDURE — 90653 IIV ADJUVANT VACCINE IM: CPT | Mod: S$GLB,,, | Performed by: INTERNAL MEDICINE

## 2024-10-21 PROCEDURE — 1160F RVW MEDS BY RX/DR IN RCRD: CPT | Mod: CPTII,S$GLB,, | Performed by: INTERNAL MEDICINE

## 2024-10-21 PROCEDURE — 1159F MED LIST DOCD IN RCRD: CPT | Mod: CPTII,S$GLB,, | Performed by: INTERNAL MEDICINE

## 2024-10-21 PROCEDURE — 99397 PER PM REEVAL EST PAT 65+ YR: CPT | Mod: S$GLB,,, | Performed by: INTERNAL MEDICINE

## 2024-10-21 PROCEDURE — 3078F DIAST BP <80 MM HG: CPT | Mod: CPTII,S$GLB,, | Performed by: INTERNAL MEDICINE

## 2024-10-21 PROCEDURE — 90471 IMMUNIZATION ADMIN: CPT | Mod: S$GLB,,, | Performed by: INTERNAL MEDICINE

## 2024-10-21 PROCEDURE — 3075F SYST BP GE 130 - 139MM HG: CPT | Mod: CPTII,S$GLB,, | Performed by: INTERNAL MEDICINE

## 2024-10-21 PROCEDURE — 1101F PT FALLS ASSESS-DOCD LE1/YR: CPT | Mod: CPTII,S$GLB,, | Performed by: INTERNAL MEDICINE

## 2024-10-21 NOTE — PROGRESS NOTES
Subjective     Patient ID: Vicente Connors is a 64 y.o. male.    Chief Complaint: Annual Exam    HPI  Review of Systems   Constitutional:  Negative for appetite change and unexpected weight change.   Respiratory:  Negative for chest tightness and shortness of breath.    Cardiovascular:  Negative for chest pain.   Gastrointestinal:  Negative for abdominal pain.   Genitourinary:  Negative for difficulty urinating, scrotal swelling and testicular pain.   Skin:         No lesions        Here for annual exam    Interested in heart health.    Home pressures fluctuate, 120s-160s/60-70s    Recent allergic reaction with hives, he wonders of from additive propylgalate.         History of Present Illness    CHIEF COMPLAINT:  Vicente presents today for follow up and to discuss heart health and preventative measures.    HYPERTENSION:  He reports concerns about fluctuating blood pressure readings despite being on medication. Recent measurements range from 126/70 to 160/78. He expresses uncertainty about the effectiveness of his current blood pressure management regimen given the variability in readings.  He uses a few different cuffs including 1 through the online axel [employer].    ALLERGIC REACTION:  He reports a recent allergic reaction manifesting as a rash on his torso, arms, and upper thighs, suspected to be caused by propyl gallate, a preservative found in a frozen meat akira he consumed. The rash lasted approximately 3 days and was effectively treated with a prescription medication and OTC Zyrtec.    GENERAL HEALTH:  He reports no new health issues in the past year, except for the recent allergic reaction.      ROS:  Eyes: -vision changes  Integumentary: +rash         Objective     Physical Exam  Vitals reviewed.   Constitutional:       General: He is not in acute distress.     Appearance: Normal appearance. He is well-developed. He is not ill-appearing, toxic-appearing or diaphoretic.   HENT:      Head: Normocephalic and  atraumatic.   Eyes:      General: No scleral icterus.  Neck:      Thyroid: No thyromegaly.   Cardiovascular:      Rate and Rhythm: Normal rate and regular rhythm.      Heart sounds: Normal heart sounds. No murmur heard.     No friction rub. No gallop.   Pulmonary:      Effort: Pulmonary effort is normal. No respiratory distress.      Breath sounds: Normal breath sounds. No wheezing or rales.   Abdominal:      General: Bowel sounds are normal. There is no distension.      Palpations: Abdomen is soft. There is no mass.      Tenderness: There is no abdominal tenderness. There is no guarding or rebound.   Musculoskeletal:         General: Normal range of motion.      Cervical back: Normal range of motion.   Lymphadenopathy:      Cervical: No cervical adenopathy.   Skin:     Findings: No lesion.   Neurological:      Mental Status: He is alert and oriented to person, place, and time.   Psychiatric:         Mood and Affect: Mood normal.         Behavior: Behavior normal.         Thought Content: Thought content normal.            Assessment and Plan     1. Routine general medical examination at a health care facility  -     PSA, Screening; Future; Expected date: 10/21/2024    2. Encounter for screening for cardiovascular disorders  -     CT Cardiac Scoring; Future; Expected date: 10/21/2024  Reviewed 2017 scan which did show some cor artery calc.      Assessment & Plan    Z13.6 Encounter for screening for cardiovascular disorders  I10 Essential (primary) hypertension  L50.0 Allergic urticaria resolved    HYPERTENSION:  - Assessed patient's blood pressure fluctuations, noting range from 126 to 160 systolic with normal diastolic readings, despite current medication regimen.  - Continued current blood pressure medication.    ALLERGIC REACTION:  - Evaluated recent allergic reaction, likely due to propyl gallate preservative in frozen meat akira, resulting in widespread rash lasting 3 days.  - Noted patient's use of Zyrtec for  allergic reaction.     Health Maintenance         Date Due Completion Date    Hepatitis C Screening Never done ---    HIV Screening Never done ---    RSV Vaccine (Age 60+ and Pregnant patients) (1 - Risk 60-74 years 1-dose series) Never done ---    Hemoglobin A1c (Diabetic Prevention Screening) 03/15/2024 3/15/2021    Influenza Vaccine (1) 09/01/2024 12/10/2023    COVID-19 Vaccine (4 - 2024-25 season) 09/01/2024 7/30/2023    Lipid Panel 01/12/2029 1/12/2024    TETANUS VACCINE 08/12/2031 8/12/2021    Colorectal Cancer Screening 07/11/2032 7/11/2022          Flu vax please.  He will try to get covid vax in abt 3 mos since had covid last month,  He will get RSV as well    Follow up in about 1 year (around 10/21/2025) for Flu vax please.      Visit today included increased complexity associated with the care of the episodic problem  addressed and managing the longitudinal care of the patient due to the serious and/or complex managed problem(s) .    Future Appointments   Date Time Provider Department Center   10/29/2024 10:15 AM BMT BIOPSY, [facility] BMT [facility]   10/31/2024  7:15 AM LAB, [facility]   11/1/2024  9:00 AM CHEMO 07 [facility] CHEMO [facility]   11/5/2024  1:20 PM Duke Coronel MD [facility] BMT [facility]   11/29/2024  9:00 AM CHEMO 05 [facility] CHEMO [facility]         This note was generated with the assistance of ambient listening technology. Verbal consent was obtained by the patient and accompanying visitor(s) for the recording of patient appointment to facilitate this note. I attest to having reviewed and edited the generated note for accuracy, though some syntax or spelling errors may persist. Please contact the author of this note for any clarification.

## 2024-10-29 ENCOUNTER — PROCEDURE VISIT (OUTPATIENT)
Dept: HEMATOLOGY/ONCOLOGY | Facility: CLINIC | Age: 64
End: 2024-10-29
Payer: COMMERCIAL

## 2024-10-29 VITALS
HEART RATE: 68 BPM | BODY MASS INDEX: 27.59 KG/M2 | DIASTOLIC BLOOD PRESSURE: 75 MMHG | TEMPERATURE: 98 F | SYSTOLIC BLOOD PRESSURE: 147 MMHG | HEIGHT: 68 IN | OXYGEN SATURATION: 97 % | RESPIRATION RATE: 20 BRPM

## 2024-10-29 DIAGNOSIS — C90.01 MULTIPLE MYELOMA IN REMISSION: Primary | ICD-10-CM

## 2024-10-29 LAB
BODY SITE - BONE MARROW: NORMAL
CLINICAL DIAGNOSIS - BONE MARROW: NORMAL
FLOW CYTOMETRY ANTIBODIES ANALYZED - BONE MARROW: NORMAL
FLOW CYTOMETRY COMMENT - BONE MARROW: NORMAL
FLOW CYTOMETRY INTERPRETATION - BONE MARROW: NORMAL
REASON FOR REFERRAL, PLASMA CELL PROLIF (PCPD), FISH: NORMAL

## 2024-10-29 PROCEDURE — 88341 IMHCHEM/IMCYTCHM EA ADD ANTB: CPT | Mod: 59 | Performed by: PATHOLOGY

## 2024-10-29 PROCEDURE — 88311 DECALCIFY TISSUE: CPT | Performed by: PATHOLOGY

## 2024-10-29 PROCEDURE — 88299 UNLISTED CYTOGENETIC STUDY: CPT

## 2024-10-29 PROCEDURE — 88184 FLOWCYTOMETRY/ TC 1 MARKER: CPT

## 2024-10-29 PROCEDURE — 38222 DX BONE MARROW BX & ASPIR: CPT | Mod: LT,S$GLB,,

## 2024-10-29 PROCEDURE — 88185 FLOWCYTOMETRY/TC ADD-ON: CPT | Mod: 59

## 2024-10-29 PROCEDURE — 88185 FLOWCYTOMETRY/TC ADD-ON: CPT | Mod: 59 | Performed by: PATHOLOGY

## 2024-10-29 PROCEDURE — 88184 FLOWCYTOMETRY/ TC 1 MARKER: CPT | Mod: 59 | Performed by: PATHOLOGY

## 2024-10-29 PROCEDURE — 88189 FLOWCYTOMETRY/READ 16 & >: CPT | Mod: ,,, | Performed by: PATHOLOGY

## 2024-10-29 PROCEDURE — 88342 IMHCHEM/IMCYTCHM 1ST ANTB: CPT | Performed by: PATHOLOGY

## 2024-10-29 PROCEDURE — 88184 FLOWCYTOMETRY/ TC 1 MARKER: CPT | Mod: 91

## 2024-10-29 PROCEDURE — 88271 CYTOGENETICS DNA PROBE: CPT

## 2024-10-29 PROCEDURE — 88313 SPECIAL STAINS GROUP 2: CPT | Mod: 59 | Performed by: PATHOLOGY

## 2024-10-29 PROCEDURE — 88305 TISSUE EXAM BY PATHOLOGIST: CPT | Performed by: PATHOLOGY

## 2024-10-29 PROCEDURE — 88185 FLOWCYTOMETRY/TC ADD-ON: CPT | Mod: 91

## 2024-10-29 RX ORDER — LIDOCAINE HYDROCHLORIDE 20 MG/ML
8 INJECTION, SOLUTION INFILTRATION; PERINEURAL
Status: COMPLETED | OUTPATIENT
Start: 2024-10-29 | End: 2024-10-29

## 2024-10-29 RX ADMIN — LIDOCAINE HYDROCHLORIDE 8 ML: 20 INJECTION, SOLUTION INFILTRATION; PERINEURAL at 10:10

## 2024-10-31 ENCOUNTER — LAB VISIT (OUTPATIENT)
Dept: LAB | Facility: OTHER | Age: 64
End: 2024-10-31
Attending: INTERNAL MEDICINE
Payer: COMMERCIAL

## 2024-10-31 DIAGNOSIS — C90.01 MULTIPLE MYELOMA IN REMISSION: ICD-10-CM

## 2024-10-31 DIAGNOSIS — Z00.00 ROUTINE GENERAL MEDICAL EXAMINATION AT A HEALTH CARE FACILITY: ICD-10-CM

## 2024-10-31 LAB
ALBUMIN SERPL BCP-MCNC: 3.4 G/DL (ref 3.5–5.2)
ALP SERPL-CCNC: 51 U/L (ref 40–150)
ALT SERPL W/O P-5'-P-CCNC: 27 U/L (ref 10–44)
ANION GAP SERPL CALC-SCNC: 10 MMOL/L (ref 8–16)
AST SERPL-CCNC: 24 U/L (ref 10–40)
BASOPHILS # BLD AUTO: 0.02 K/UL (ref 0–0.2)
BASOPHILS NFR BLD: 0.6 % (ref 0–1.9)
BILIRUB SERPL-MCNC: 1.3 MG/DL (ref 0.1–1)
BUN SERPL-MCNC: 10 MG/DL (ref 8–23)
CALCIUM SERPL-MCNC: 8.8 MG/DL (ref 8.7–10.5)
CHLORIDE SERPL-SCNC: 106 MMOL/L (ref 95–110)
CO2 SERPL-SCNC: 24 MMOL/L (ref 23–29)
COMPLEXED PSA SERPL-MCNC: 3.1 NG/ML (ref 0–4)
CREAT SERPL-MCNC: 1.1 MG/DL (ref 0.5–1.4)
DIFFERENTIAL METHOD BLD: ABNORMAL
DNA/RNA EXTRACT AND HOLD RESULT: NORMAL
DNA/RNA EXTRACTION: NORMAL
EOSINOPHIL # BLD AUTO: 0.1 K/UL (ref 0–0.5)
EOSINOPHIL NFR BLD: 4 % (ref 0–8)
ERYTHROCYTE [DISTWIDTH] IN BLOOD BY AUTOMATED COUNT: 15.5 % (ref 11.5–14.5)
EST. GFR  (NO RACE VARIABLE): >60 ML/MIN/1.73 M^2
EXHR SPECIMEN TYPE: NORMAL
GLUCOSE SERPL-MCNC: 100 MG/DL (ref 70–110)
HCT VFR BLD AUTO: 37.4 % (ref 40–54)
HGB BLD-MCNC: 12.5 G/DL (ref 14–18)
IGA SERPL-MCNC: 182 MG/DL (ref 40–350)
IGG SERPL-MCNC: 663 MG/DL (ref 650–1600)
IGM SERPL-MCNC: 16 MG/DL (ref 50–300)
IMM GRANULOCYTES # BLD AUTO: 0.01 K/UL (ref 0–0.04)
IMM GRANULOCYTES NFR BLD AUTO: 0.3 % (ref 0–0.5)
LYMPHOCYTES # BLD AUTO: 1.3 K/UL (ref 1–4.8)
LYMPHOCYTES NFR BLD: 38 % (ref 18–48)
MCH RBC QN AUTO: 31.9 PG (ref 27–31)
MCHC RBC AUTO-ENTMCNC: 33.4 G/DL (ref 32–36)
MCV RBC AUTO: 95 FL (ref 82–98)
MONOCYTES # BLD AUTO: 0.5 K/UL (ref 0.3–1)
MONOCYTES NFR BLD: 13.8 % (ref 4–15)
NEUTROPHILS # BLD AUTO: 1.5 K/UL (ref 1.8–7.7)
NEUTROPHILS NFR BLD: 43.3 % (ref 38–73)
NRBC BLD-RTO: 0 /100 WBC
PCPDS FINAL DIAGNOSIS: NORMAL
PCPDS PRE-ANALYSIS PRE-SORT: NORMAL
PLATELET # BLD AUTO: 137 K/UL (ref 150–450)
PMV BLD AUTO: 9.3 FL (ref 9.2–12.9)
POTASSIUM SERPL-SCNC: 3.3 MMOL/L (ref 3.5–5.1)
PROT SERPL-MCNC: 6.2 G/DL (ref 6–8.4)
RBC # BLD AUTO: 3.92 M/UL (ref 4.6–6.2)
SODIUM SERPL-SCNC: 140 MMOL/L (ref 136–145)
WBC # BLD AUTO: 3.47 K/UL (ref 3.9–12.7)

## 2024-10-31 PROCEDURE — 36415 COLL VENOUS BLD VENIPUNCTURE: CPT | Performed by: INTERNAL MEDICINE

## 2024-10-31 PROCEDURE — 84165 PROTEIN E-PHORESIS SERUM: CPT | Mod: 26,,, | Performed by: PATHOLOGY

## 2024-10-31 PROCEDURE — 84165 PROTEIN E-PHORESIS SERUM: CPT | Performed by: INTERNAL MEDICINE

## 2024-10-31 PROCEDURE — 80053 COMPREHEN METABOLIC PANEL: CPT | Performed by: INTERNAL MEDICINE

## 2024-10-31 PROCEDURE — 85025 COMPLETE CBC W/AUTO DIFF WBC: CPT | Performed by: INTERNAL MEDICINE

## 2024-10-31 PROCEDURE — 83521 IG LIGHT CHAINS FREE EACH: CPT | Performed by: INTERNAL MEDICINE

## 2024-10-31 PROCEDURE — 84153 ASSAY OF PSA TOTAL: CPT | Performed by: INTERNAL MEDICINE

## 2024-10-31 PROCEDURE — 82784 ASSAY IGA/IGD/IGG/IGM EACH: CPT | Performed by: INTERNAL MEDICINE

## 2024-10-31 RX ORDER — HEPARIN 100 UNIT/ML
500 SYRINGE INTRAVENOUS
Status: CANCELLED | OUTPATIENT
Start: 2024-11-01

## 2024-10-31 RX ORDER — SODIUM CHLORIDE 0.9 % (FLUSH) 0.9 %
10 SYRINGE (ML) INJECTION
Status: CANCELLED | OUTPATIENT
Start: 2024-11-01

## 2024-10-31 RX ORDER — BORTEZOMIB 3.5 MG/1
1.3 INJECTION, POWDER, LYOPHILIZED, FOR SOLUTION INTRAVENOUS; SUBCUTANEOUS
Status: CANCELLED | OUTPATIENT
Start: 2024-11-01

## 2024-10-31 RX ORDER — LENALIDOMIDE 10 MG/1
1 CAPSULE ORAL DAILY
Qty: 21 EACH | Refills: 0 | Status: SHIPPED | OUTPATIENT
Start: 2024-10-31

## 2024-11-01 ENCOUNTER — INFUSION (OUTPATIENT)
Dept: INFUSION THERAPY | Facility: OTHER | Age: 64
End: 2024-11-01
Attending: INTERNAL MEDICINE
Payer: COMMERCIAL

## 2024-11-01 VITALS
SYSTOLIC BLOOD PRESSURE: 136 MMHG | BODY MASS INDEX: 28.17 KG/M2 | DIASTOLIC BLOOD PRESSURE: 70 MMHG | TEMPERATURE: 98 F | HEART RATE: 73 BPM | OXYGEN SATURATION: 97 % | HEIGHT: 68 IN | WEIGHT: 185.88 LBS | RESPIRATION RATE: 16 BRPM

## 2024-11-01 DIAGNOSIS — C90.01 MULTIPLE MYELOMA IN REMISSION: Primary | ICD-10-CM

## 2024-11-01 LAB
% B-CELL PRECURSORS: 1.36 %
% MAST CELLS: 1.7 %
ABNORMAL PLASMA CELL EVENTS: 0
ALBUMIN SERPL ELPH-MCNC: 3.53 G/DL (ref 3.35–5.55)
ALPHA1 GLOB SERPL ELPH-MCNC: 0.26 G/DL (ref 0.17–0.41)
ALPHA2 GLOB SERPL ELPH-MCNC: 0.57 G/DL (ref 0.43–0.99)
B-GLOBULIN SERPL ELPH-MCNC: 0.71 G/DL (ref 0.5–1.1)
COMMENT: NORMAL
FINAL PATHOLOGIC DIAGNOSIS: NORMAL
GAMMA GLOB SERPL ELPH-MCNC: 0.62 G/DL (ref 0.67–1.58)
GROSS: NORMAL
KAPPA LC SER QL IA: 1.89 MG/DL (ref 0.33–1.94)
KAPPA LC/LAMBDA SER IA: 1.41 (ref 0.26–1.65)
LAMBDA LC SER QL IA: 1.34 MG/DL (ref 0.57–2.63)
LOWER LIMIT OF QUANTITATION (LLOQ): 1 X10(-5)
Lab: NORMAL
MICROSCOPIC EXAM: NORMAL
MINIMAL RESIDUAL DISEASE DETECTION (MRD), BONE MARROW: 0 %
PATIENT / SAMPLE THEORETICAL LOQ: 2 X10(-6)
PERCENT NORMAL PLASMA CELLS (OF TOTAL PC): 100 %
PLASMA CELLS ASSESS MAR: NORMAL
POLY PLASMA CELL EVENTS: NORMAL
PROT SERPL-MCNC: 5.7 G/DL (ref 6–8.4)
TOTAL CELLS COUNTED MAR: NORMAL
TOTAL PLASMA CELL EVENTS: NORMAL
VALIDATED ASSAY SENSITIVITY: 7.39 X10(-6)

## 2024-11-01 PROCEDURE — 63600175 PHARM REV CODE 636 W HCPCS: Mod: JZ,JG | Performed by: INTERNAL MEDICINE

## 2024-11-01 PROCEDURE — 96401 CHEMO ANTI-NEOPL SQ/IM: CPT

## 2024-11-01 RX ORDER — SODIUM CHLORIDE 0.9 % (FLUSH) 0.9 %
10 SYRINGE (ML) INJECTION
Status: DISCONTINUED | OUTPATIENT
Start: 2024-11-01 | End: 2024-11-01 | Stop reason: HOSPADM

## 2024-11-01 RX ORDER — BORTEZOMIB 3.5 MG/1
1.3 INJECTION, POWDER, LYOPHILIZED, FOR SOLUTION INTRAVENOUS; SUBCUTANEOUS
Status: COMPLETED | OUTPATIENT
Start: 2024-11-01 | End: 2024-11-01

## 2024-11-01 RX ORDER — HEPARIN 100 UNIT/ML
500 SYRINGE INTRAVENOUS
Status: DISCONTINUED | OUTPATIENT
Start: 2024-11-01 | End: 2024-11-01 | Stop reason: HOSPADM

## 2024-11-01 RX ADMIN — BORTEZOMIB 2.5 MG: 3.5 INJECTION, POWDER, LYOPHILIZED, FOR SOLUTION INTRAVENOUS; SUBCUTANEOUS at 09:11

## 2024-11-04 LAB — PATHOLOGIST INTERPRETATION SPE: NORMAL

## 2024-11-05 ENCOUNTER — OFFICE VISIT (OUTPATIENT)
Dept: HEMATOLOGY/ONCOLOGY | Facility: CLINIC | Age: 64
End: 2024-11-05
Payer: COMMERCIAL

## 2024-11-05 ENCOUNTER — PATIENT MESSAGE (OUTPATIENT)
Dept: HEMATOLOGY/ONCOLOGY | Facility: CLINIC | Age: 64
End: 2024-11-05

## 2024-11-05 DIAGNOSIS — D84.821 IMMUNODEFICIENCY DUE TO DRUGS: Primary | ICD-10-CM

## 2024-11-05 DIAGNOSIS — Z94.84 HISTORY OF AUTO STEM CELL TRANSPLANT: ICD-10-CM

## 2024-11-05 DIAGNOSIS — C90.01 MULTIPLE MYELOMA IN REMISSION: ICD-10-CM

## 2024-11-05 DIAGNOSIS — Z79.899 IMMUNODEFICIENCY DUE TO DRUGS: Primary | ICD-10-CM

## 2024-11-05 PROCEDURE — G2211 COMPLEX E/M VISIT ADD ON: HCPCS | Mod: 95,,, | Performed by: INTERNAL MEDICINE

## 2024-11-05 PROCEDURE — 99215 OFFICE O/P EST HI 40 MIN: CPT | Mod: 95,,, | Performed by: INTERNAL MEDICINE

## 2024-11-05 NOTE — Clinical Note
-velcade  at [facility] 11/29 and 12/27 -cbc, cmp, serum free light chains, quantitative immunoglobulins, serum electropheresis, serum immunofixation lab  and MD appt on 12/19

## 2024-11-05 NOTE — PROGRESS NOTES
Chief Complaint : Multiple myeloma, s/p tandem auto SCT, on VRd maintenance, hematology follow up      History of Present Illness : Vicente Connors is a 63 y.o. male here for hematology follow up. He has recently moved to [place] from [place]. He has history of stage IIIA, [employer] stage II IgA kappa multiple myeloma, which is likely intermediate-risk based upon the presence of t(4;14) with hyperdiploidy, based on records available through care everywhere.    IgA Kappa multiple Myeloma was diagnosed in 2014.   He was started on velcade, revlimid and dexamethasone at diagnosis.   Of note, he developed a popliteal DVT and was started on Lovenox and later switched Xarelto.   He completed a stem cell transplant with Dr Schuster in May 2015. He is s/p  tandem autologous transplant in 2015 and has been on triple maintenance with bortezomib, lenalidomide and dexamethasone since then.   He also has peripheral neuropathy, h/o LE DVT on chronic anti-coagulation, GERD, vertebral fracture. He has history of DVT and remains on chronic anticoagulation.  He has had multiple skeletal complications since his original diagnosis.  He had prolonged bout of diarrhea in January 2023. All stool studies were negative/ unremarkable.  He had COVID 19 infection in March 2023. He received paxlovid  MRI spine in March 2023 showed compression fracture of L1 with retropulsion of the posterior fragment as well as minimal compression fractures of the superior endplates of T8 and T11. MRI pelvis with facet arthropathy in the lower lumbar spine.  Hip joints exhibiting bilateral chondral thinning with labral fraying and osteophyte production.     Interval History: He is here for to review results of marrow biopsy.  Feels well.      Review of patient's allergies indicates:  No Known Allergies        Current Outpatient Medications   Medication Sig    acyclovir (ZOVIRAX) 400 MG tablet Take 1 tablet (400 mg total) by mouth 2 (two) times  daily.    bortezomib (VELCADE INJ) Inject as directed. Pt gets every month    calcium carb/vit D3/minerals (CALCIUM-VITAMIN D ORAL)     dexAMETHasone (DECADRON) 4 MG Tab TAKE 10 TABLETS (40 MG DOSE) BY MOUTH DAY OF AND DAY AFTER VELCADE once monthly    folic acid-vit B6-vit B12 2.5-25-2 mg (FOLBIC) 2.5-25-2 mg Tab Take 1 tablet by mouth once daily.    hydroCHLOROthiazide (HYDRODIURIL) 25 MG tablet Take 1 tablet (25 mg total) by mouth once daily.    lenalidomide 10 mg Cap Take 1 capsule by mouth once daily Auth [id number] 10/8/24 BRAND NAME ONLY NELLA-1.    magnesium oxide 500 mg Tab once daily.    multivitamin with minerals (MULTI-VITAMIN W/MINERALS ORAL)     omeprazole (PRILOSEC) 20 MG capsule     REVLIMID 10 mg Cap Take 1 capsule by mouth once daily TAKE 1 CAPSULE ONCE DAILY ON DAYS 1 THROUGH 21 OF A 28 DAY CYCLE. Auth [id number] 10/31/24.    rivaroxaban (XARELTO) 15 mg Tab Take 1 tablet (15 mg total) by mouth once daily.    sulfamethoxazole-trimethoprim 800-160mg (BACTRIM DS) 800-160 mg Tab TAKE 1 TABLET EVERY MONDAY, WEDNESDAY AND FRIDAY    triamcinolone acetonide 0.1% (KENALOG) 0.1 % ointment Apply topically 2 (two) times daily.    zoledronic acid (ZOMETA IV) Inject into the vein. Pt gets every 3 months     No current facility-administered medications for this visit.      Review of Systems   Constitutional:  Positive for malaise/fatigue. Negative for chills, fever and weight loss.   HENT:  Negative for congestion, ear pain and sinus pain.    Eyes:  Negative for double vision, photophobia and pain.   Respiratory:  Negative for sputum production and stridor.    Cardiovascular:  Negative for chest pain, palpitations, orthopnea and claudication.   Gastrointestinal:  Negative for blood in stool, constipation, diarrhea, melena and nausea.   Genitourinary:  Negative for dysuria, frequency and urgency.   Musculoskeletal:  Negative for myalgias and neck pain.   Neurological:  Positive for tingling. Negative for dizziness,  tremors and speech change.   Endo/Heme/Allergies:  Negative for environmental allergies. Does not bruise/bleed easily.   Psychiatric/Behavioral:  Negative for substance abuse and suicidal ideas.           There were no vitals filed for this visit.        PS: ECOG 1    Physical Exam  HENT:      Head: Normocephalic and atraumatic.      Mouth/Throat:      Pharynx: No posterior oropharyngeal erythema.   Eyes:      General: No scleral icterus.  Cardiovascular:      Rate and Rhythm: Normal rate and regular rhythm.   Pulmonary:      Effort: Pulmonary effort is normal. No respiratory distress.      Breath sounds: Normal breath sounds.   Abdominal:      General: There is no distension.      Palpations: There is no mass.      Tenderness: There is no abdominal tenderness.   Musculoskeletal:         General: No swelling.   Lymphadenopathy:      Cervical: No cervical adenopathy.   Neurological:      General: No focal deficit present.      Mental Status: He is alert and oriented to person, place, and time.      Cranial Nerves: No cranial nerve deficit.          Lab Results   Component Value Date    WBC 3.47 (L) 10/31/2024    HGB 12.5 (L) 10/31/2024    HCT 37.4 (L) 10/31/2024    MCV 95 10/31/2024     (L) 10/31/2024       Gran # (ANC)   Date Value Ref Range Status   10/31/2024 1.5 (L) 1.8 - 7.7 K/uL Final     Gran %   Date Value Ref Range Status   10/31/2024 43.3 38.0 - 73.0 % Final     CMP  Sodium   Date Value Ref Range Status   10/31/2024 140 136 - 145 mmol/L Final     Potassium   Date Value Ref Range Status   10/31/2024 3.3 (L) 3.5 - 5.1 mmol/L Final     Chloride   Date Value Ref Range Status   10/31/2024 106 95 - 110 mmol/L Final     CO2   Date Value Ref Range Status   10/31/2024 24 23 - 29 mmol/L Final     Glucose   Date Value Ref Range Status   10/31/2024 100 70 - 110 mg/dL Final     BUN   Date Value Ref Range Status   10/31/2024 10 8 - 23 mg/dL Final     Creatinine   Date Value Ref Range Status   10/31/2024 1.1 0.5 -  1.4 mg/dL Final     Calcium   Date Value Ref Range Status   10/31/2024 8.8 8.7 - 10.5 mg/dL Final     Total Protein   Date Value Ref Range Status   10/31/2024 6.2 6.0 - 8.4 g/dL Final     Albumin   Date Value Ref Range Status   10/31/2024 3.4 (L) 3.5 - 5.2 g/dL Final     Total Bilirubin   Date Value Ref Range Status   10/31/2024 1.3 (H) 0.1 - 1.0 mg/dL Final     Comment:     For infants and newborns, interpretation of results should be based  on gestational age, weight and in agreement with clinical  observations.    Premature Infant recommended reference ranges:  Up to 24 hours.............<8.0 mg/dL  Up to 48 hours............<12.0 mg/dL  3-5 days..................<15.0 mg/dL  6-29 days.................<15.0 mg/dL       Alkaline Phosphatase   Date Value Ref Range Status   10/31/2024 51 40 - 150 U/L Final     AST   Date Value Ref Range Status   10/31/2024 24 10 - 40 U/L Final     ALT   Date Value Ref Range Status   10/31/2024 27 10 - 44 U/L Final     Anion Gap   Date Value Ref Range Status   10/31/2024 10 8 - 16 mmol/L Final     eGFR   Date Value Ref Range Status   10/31/2024 >60 >60 mL/min/1.73 m^2 Final     IgG   Date Value Ref Range Status   10/31/2024 663 650 - 1600 mg/dL Final     Comment:     IgG Cord Blood Reference Range: 650-1600 mg/dL.     IgA   Date Value Ref Range Status   10/31/2024 182 40 - 350 mg/dL Final     Comment:     IgA Cord Blood Reference Range: <5 mg/dL.     IgM   Date Value Ref Range Status   10/31/2024 16 (L) 50 - 300 mg/dL Final     Comment:     IgM Cord Blood Reference Range: <25 mg/dL.     Kappa Free Light Chains   Date Value Ref Range Status   10/31/2024 1.89 0.33 - 1.94 mg/dL Final   10/03/2024 1.54 0.33 - 1.94 mg/dL Final   09/06/2024 1.30 0.33 - 1.94 mg/dL Final     Lambda Free Light Chains   Date Value Ref Range Status   10/31/2024 1.34 0.57 - 2.63 mg/dL Final   10/03/2024 1.03 0.57 - 2.63 mg/dL Final   09/06/2024 1.06 0.57 - 2.63 mg/dL Final     Kappa/Lambda FLC Ratio   Date Value  Ref Range Status   10/31/2024 1.41 0.26 - 1.65 Final     Comment:     Undetected antigen excess is a rare event but cannot   be excluded. If these free light chain results do not   agree with other clinical or laboratory findings or   if the sample is from a patient that has previously   demonstrated antigen excess, discuss with the testing   laboratory.   Results should always be interpreted in conjunction   with other laboratory tests and clinical evidence.     10/03/2024 1.50 0.26 - 1.65 Final     Comment:     Undetected antigen excess is a rare event but cannot   be excluded. If these free light chain results do not   agree with other clinical or laboratory findings or   if the sample is from a patient that has previously   demonstrated antigen excess, discuss with the testing   laboratory.   Results should always be interpreted in conjunction   with other laboratory tests and clinical evidence.     09/06/2024 1.23 0.26 - 1.65 Final     Comment:     Undetected antigen excess is a rare event but cannot   be excluded. If these free light chain results do not   agree with other clinical or laboratory findings or   if the sample is from a patient that has previously   demonstrated antigen excess, discuss with the testing   laboratory.   Results should always be interpreted in conjunction   with other laboratory tests and clinical evidence.       Pathologist Interpretation SPE   Date Value Ref Range Status   10/31/2024 REVIEWED  Final     Comment:       Electronically reviewed and signed by:  Santos Chaves MD  Signed on 11/04/24 at 12:05  Decreased total protein.  Decreased gamma globulin.     10/03/2024 REVIEWED  Final     Comment:       Electronically reviewed and signed by:  Santos Chaves MD  Signed on 10/07/24 at 14:05  Decreased total protein.  Decreased gamma globulin.     09/06/2024 REVIEWED  Final     Comment:       Electronically reviewed and signed by:  Shima Fernando MD  Signed on 09/09/24 at  14:43  Decreased total protein.  Decreased gamma globulins.    No paraprotein band identified.       Pathologist Interpretation JACQUELIN   Date Value Ref Range Status   10/03/2024 REVIEWED  Final     Comment:       Electronically reviewed and signed by:  Santos Chaves MD  Signed on 10/07/24 at 14:05  No monoclonal peaks identified.        omponent 2 wk ago   Final Pathologic Diagnosis     LEFT ILIAC CREST BONE MARROW ASPIRATE, BONE MARROW CLOT, AND BONE MARROW CORE BIOPSY WITH:    CELLULARITY=30%, TRILINEAGE HEMATOPOIETIC ACTIVITY (M/E=1.5:1).  NO DEFINITIVE IMMUNOPHENOTYPIC EVIDENCE OF RESIDUAL PLASMA CELL NEOPLASM.  SEE COMMENT.  PLASMACYTOSIS (8-10%).  STORAGE IRON NOT IDENTIFIED.  ADEQUATE NUMBER OF MEGAKARYOCYTES.         Multiple Myeloma MRD by Flow, BM0 Result Notes      Component 2 wk ago   Final Pathologic Diagnosis     LEFT ILIAC CREST BONE MARROW ASPIRATE, BONE MARROW CLOT, AND BONE MARROW CORE BIOPSY WITH:    CELLULARITY=30%, TRILINEAGE HEMATOPOIETIC ACTIVITY (M/E=1.5:1).  NO DEFINITIVE IMMUNOPHENOTYPIC EVIDENCE OF RESIDUAL PLASMA CELL NEOPLASM.  SEE COMMENT.  PLASMACYTOSIS (8-10%).  STORAGE IRON NOT IDENTIFIED.  ADEQUATE NUMBER OF MEGAKARYOCYTES.            Order: [id number]  Status: Final result       Visible to patient: Yes (seen)       Next appt: 11/29/2024 at 09:00 AM in Chemotherapy (CHEMO [facility])       Dx: Multiple myeloma in remission    0 Result Notes       Component Ref Range & Units 2 wk ago   Minimal Residual Disease Detection, Bone Marrow % 0.0000   PERCENT NORMAL PLASMA CELLS (OF TOTAL PC) % 100.0   NON-AGGREGATE EVENTS  1424672   TOTAL PLASMA CELL EVENTS  67500   POLY PLASMA CELL EVENTS  26765   ABNORMAL PLASMA CELL EVENTS  0   % B-CELL PRECURSORS % 1.358   % MAST CELLS % 1.704   VALIDATED ASSAY SENSITIVITY x10(-6) 7.39   Comment: LOQ is calculated as a minimum of 20 abnormal PC events  divided by the actual number of non-aggregate events  collected for this sample.   LOWER LIMIT OF  QUANTITATION (LLOQ) x10(-5) 1.0   PATIENT / SAMPLE THEORETICAL LOQ x10(-6) 2.0   Comment: LLOQ is defined as a minimum of 20 abnormal PC events  detected in 10 million non-aggregate events, based on  literature and internal validation data.   Final Diagnosis  SEE BELOW   Comment: Bone marrow, flow cytometric immunophenotyping:    No monotypic plasma cells identified (MRD-negative).    Reviewed by: Becky Luz M.D., Ph.D.        Assessment:    1. IgA kappa multiple myeloma in remission     -Vicente Connors is a 64 y.o. y.o.-year-old male with history of stage IIIA, ISS II IgA kappa multiple myeloma, which is likely intermediate-risk based upon the presence of t(4;14) with hyperdiploidy   -S/ p  tandem auto stem cell transplant in 2015 with a good biochemical remission  -Now on triple maintenance: velcade 1.3 mg/m2 sub q monthly, dex 40 mg PO day of and day after monthly velcade , revlimid 10 mg  PO for 21 days on / 7 days off every 28 days  -- his 10/31/24 biochemical studies/labs cw with CR and no CRAB  -we discussed systemic restaging  to reassess MRD status for possible therapy deescalation in light of almost 10 years since SCT; he was  in agreement   -10/29/24 marrow shows 10e-5 MRD negative sCR; good news shared with pt; he expressed hesitancy over stopping maintenance  -discussed I would reach out to MDs at [facility] for their opinion and get back to him  -plan to continue maintenance as above for now     2. Normocytic anemia    --grade 1   -likely secondary to chemotherapy    3. Leukopenia    --Likely secondary to chemotherapy      4. Absolute neutropenia    --Likely secondary to chemotherapy  -Afebrile; ANC 1.78 on 6/10/24    5. Thrombocytopenia    -Mild,asymptomatic  -Platelet count 152k on 6/10/24        6. Low Back Pain: chronic and unchanged; not on any analgesic at this time    --Spine MRI done 1/21/21, result detailed above under imaging    7. History of LE DVT:     --Continue Xarelto   --Take with  largest meal of the day  --Notify office of any scheduled procedure/surgery as Xarelto will need to be interrupted   -no bleeding diathesis reported    8. Infectious disease      --Continue prophylactic bactrim and acyclovir   --recommend annual flu vaccine, COVID 19 booster      9. Bone health:     --MRI spine1/21 neg   --Repeat MRI spine and pelvis in March 2023 did not demonstrate any new lesions  --Last Zometa given 4/15/24, q3 months  -he has pain in his right lower jaw today, will hold zometa until he sees his dentist    10. Diarrhea    --now improved  -stool studies neagtive in Jan 2023    11. Health Maintenance:     --Colonoscopy 2011 and 2017, repeat 2022   --PSA  up to date   --covid vaccine 3/31 pfizer booster done  --Got second booster 2/17/22    -recommend COVID booster     FU:     -velcade  at [facility] 11/29 and 12/27  -cbc, cmp, serum free light chains, quantitative immunoglobulins, serum electropheresis, serum immunofixation lab  and MD appt on 12/19

## 2024-11-13 ENCOUNTER — PATIENT MESSAGE (OUTPATIENT)
Dept: HEMATOLOGY/ONCOLOGY | Facility: CLINIC | Age: 64
End: 2024-11-13
Payer: COMMERCIAL

## 2024-11-13 DIAGNOSIS — C90.01 MULTIPLE MYELOMA IN REMISSION: Primary | ICD-10-CM

## 2024-11-25 DIAGNOSIS — C90.01 MULTIPLE MYELOMA IN REMISSION: ICD-10-CM

## 2024-11-25 RX ORDER — LENALIDOMIDE 10 MG/1
CAPSULE ORAL
Qty: 21 CAPSULE | Refills: 0 | Status: SHIPPED | OUTPATIENT
Start: 2024-11-25

## 2024-11-27 ENCOUNTER — LAB VISIT (OUTPATIENT)
Dept: LAB | Facility: OTHER | Age: 64
End: 2024-11-27
Attending: INTERNAL MEDICINE
Payer: COMMERCIAL

## 2024-11-27 DIAGNOSIS — C90.01 MULTIPLE MYELOMA IN REMISSION: ICD-10-CM

## 2024-11-27 LAB
ALBUMIN SERPL BCP-MCNC: 3.7 G/DL (ref 3.5–5.2)
ALP SERPL-CCNC: 63 U/L (ref 40–150)
ALT SERPL W/O P-5'-P-CCNC: 30 U/L (ref 10–44)
ANION GAP SERPL CALC-SCNC: 8 MMOL/L (ref 8–16)
AST SERPL-CCNC: 30 U/L (ref 10–40)
BASOPHILS # BLD AUTO: 0.03 K/UL (ref 0–0.2)
BASOPHILS NFR BLD: 0.9 % (ref 0–1.9)
BILIRUB SERPL-MCNC: 1.5 MG/DL (ref 0.1–1)
BUN SERPL-MCNC: 17 MG/DL (ref 8–23)
CALCIUM SERPL-MCNC: 9.1 MG/DL (ref 8.7–10.5)
CHLORIDE SERPL-SCNC: 105 MMOL/L (ref 95–110)
CO2 SERPL-SCNC: 27 MMOL/L (ref 23–29)
CREAT SERPL-MCNC: 1 MG/DL (ref 0.5–1.4)
DIFFERENTIAL METHOD BLD: ABNORMAL
EOSINOPHIL # BLD AUTO: 0.1 K/UL (ref 0–0.5)
EOSINOPHIL NFR BLD: 1.9 % (ref 0–8)
ERYTHROCYTE [DISTWIDTH] IN BLOOD BY AUTOMATED COUNT: 14.7 % (ref 11.5–14.5)
EST. GFR  (NO RACE VARIABLE): >60 ML/MIN/1.73 M^2
GLUCOSE SERPL-MCNC: 108 MG/DL (ref 70–110)
HCT VFR BLD AUTO: 37.2 % (ref 40–54)
HGB BLD-MCNC: 12.6 G/DL (ref 14–18)
IGA SERPL-MCNC: 195 MG/DL (ref 40–350)
IGG SERPL-MCNC: 663 MG/DL (ref 650–1600)
IGM SERPL-MCNC: 12 MG/DL (ref 50–300)
IMM GRANULOCYTES # BLD AUTO: 0.01 K/UL (ref 0–0.04)
IMM GRANULOCYTES NFR BLD AUTO: 0.3 % (ref 0–0.5)
LYMPHOCYTES # BLD AUTO: 1.4 K/UL (ref 1–4.8)
LYMPHOCYTES NFR BLD: 41.8 % (ref 18–48)
MCH RBC QN AUTO: 32.3 PG (ref 27–31)
MCHC RBC AUTO-ENTMCNC: 33.9 G/DL (ref 32–36)
MCV RBC AUTO: 95 FL (ref 82–98)
MONOCYTES # BLD AUTO: 0.4 K/UL (ref 0.3–1)
MONOCYTES NFR BLD: 11.5 % (ref 4–15)
NEUTROPHILS # BLD AUTO: 1.4 K/UL (ref 1.8–7.7)
NEUTROPHILS NFR BLD: 43.6 % (ref 38–73)
NRBC BLD-RTO: 0 /100 WBC
PLATELET # BLD AUTO: 151 K/UL (ref 150–450)
PMV BLD AUTO: 9.4 FL (ref 9.2–12.9)
POTASSIUM SERPL-SCNC: 3.5 MMOL/L (ref 3.5–5.1)
PROT SERPL-MCNC: 6.5 G/DL (ref 6–8.4)
RBC # BLD AUTO: 3.9 M/UL (ref 4.6–6.2)
SODIUM SERPL-SCNC: 140 MMOL/L (ref 136–145)
WBC # BLD AUTO: 3.23 K/UL (ref 3.9–12.7)

## 2024-11-27 PROCEDURE — 82784 ASSAY IGA/IGD/IGG/IGM EACH: CPT | Mod: 59 | Performed by: INTERNAL MEDICINE

## 2024-11-27 PROCEDURE — 83521 IG LIGHT CHAINS FREE EACH: CPT | Mod: 59 | Performed by: INTERNAL MEDICINE

## 2024-11-27 PROCEDURE — 85025 COMPLETE CBC W/AUTO DIFF WBC: CPT | Performed by: INTERNAL MEDICINE

## 2024-11-27 PROCEDURE — 84165 PROTEIN E-PHORESIS SERUM: CPT | Mod: 26,,, | Performed by: PATHOLOGY

## 2024-11-27 PROCEDURE — 86334 IMMUNOFIX E-PHORESIS SERUM: CPT | Mod: 26,,, | Performed by: PATHOLOGY

## 2024-11-27 PROCEDURE — 86334 IMMUNOFIX E-PHORESIS SERUM: CPT | Performed by: INTERNAL MEDICINE

## 2024-11-27 PROCEDURE — 36415 COLL VENOUS BLD VENIPUNCTURE: CPT | Performed by: INTERNAL MEDICINE

## 2024-11-27 PROCEDURE — 84165 PROTEIN E-PHORESIS SERUM: CPT | Performed by: INTERNAL MEDICINE

## 2024-11-27 PROCEDURE — 80053 COMPREHEN METABOLIC PANEL: CPT | Performed by: INTERNAL MEDICINE

## 2024-11-28 RX ORDER — BORTEZOMIB 3.5 MG/1
1.3 INJECTION, POWDER, LYOPHILIZED, FOR SOLUTION INTRAVENOUS; SUBCUTANEOUS
OUTPATIENT
Start: 2024-12-27

## 2024-11-28 RX ORDER — SODIUM CHLORIDE 0.9 % (FLUSH) 0.9 %
10 SYRINGE (ML) INJECTION
OUTPATIENT
Start: 2024-12-27

## 2024-11-28 RX ORDER — BORTEZOMIB 3.5 MG/1
1.3 INJECTION, POWDER, LYOPHILIZED, FOR SOLUTION INTRAVENOUS; SUBCUTANEOUS
Status: CANCELLED | OUTPATIENT
Start: 2024-11-29

## 2024-11-28 RX ORDER — HEPARIN 100 UNIT/ML
500 SYRINGE INTRAVENOUS
Status: CANCELLED | OUTPATIENT
Start: 2024-11-29

## 2024-11-28 RX ORDER — SODIUM CHLORIDE 0.9 % (FLUSH) 0.9 %
10 SYRINGE (ML) INJECTION
Status: CANCELLED | OUTPATIENT
Start: 2024-11-29

## 2024-11-28 RX ORDER — HEPARIN 100 UNIT/ML
500 SYRINGE INTRAVENOUS
OUTPATIENT
Start: 2024-12-27

## 2024-11-29 ENCOUNTER — INFUSION (OUTPATIENT)
Dept: INFUSION THERAPY | Facility: OTHER | Age: 64
End: 2024-11-29
Attending: INTERNAL MEDICINE
Payer: COMMERCIAL

## 2024-11-29 VITALS
TEMPERATURE: 98 F | OXYGEN SATURATION: 97 % | SYSTOLIC BLOOD PRESSURE: 148 MMHG | HEIGHT: 68 IN | BODY MASS INDEX: 27.74 KG/M2 | DIASTOLIC BLOOD PRESSURE: 69 MMHG | WEIGHT: 183 LBS | HEART RATE: 80 BPM | RESPIRATION RATE: 16 BRPM

## 2024-11-29 DIAGNOSIS — C90.01 MULTIPLE MYELOMA IN REMISSION: Primary | ICD-10-CM

## 2024-11-29 LAB
ALBUMIN SERPL ELPH-MCNC: 3.8 G/DL (ref 3.35–5.55)
ALPHA1 GLOB SERPL ELPH-MCNC: 0.28 G/DL (ref 0.17–0.41)
ALPHA2 GLOB SERPL ELPH-MCNC: 0.56 G/DL (ref 0.43–0.99)
B-GLOBULIN SERPL ELPH-MCNC: 0.82 G/DL (ref 0.5–1.1)
GAMMA GLOB SERPL ELPH-MCNC: 0.64 G/DL (ref 0.67–1.58)
INTERPRETATION SERPL IFE-IMP: NORMAL
KAPPA LC SER QL IA: 1.49 MG/DL (ref 0.33–1.94)
KAPPA LC/LAMBDA SER IA: 1.46 (ref 0.26–1.65)
LAMBDA LC SER QL IA: 1.02 MG/DL (ref 0.57–2.63)
PROT SERPL-MCNC: 6.1 G/DL (ref 6–8.4)

## 2024-11-29 PROCEDURE — 96401 CHEMO ANTI-NEOPL SQ/IM: CPT

## 2024-11-29 PROCEDURE — 63600175 PHARM REV CODE 636 W HCPCS: Mod: JZ,JG | Performed by: INTERNAL MEDICINE

## 2024-11-29 RX ORDER — BORTEZOMIB 3.5 MG/1
1.3 INJECTION, POWDER, LYOPHILIZED, FOR SOLUTION INTRAVENOUS; SUBCUTANEOUS
Status: COMPLETED | OUTPATIENT
Start: 2024-11-29 | End: 2024-11-29

## 2024-11-29 RX ORDER — HEPARIN 100 UNIT/ML
500 SYRINGE INTRAVENOUS
Status: DISCONTINUED | OUTPATIENT
Start: 2024-11-29 | End: 2024-11-29 | Stop reason: HOSPADM

## 2024-11-29 RX ORDER — SODIUM CHLORIDE 0.9 % (FLUSH) 0.9 %
10 SYRINGE (ML) INJECTION
Status: DISCONTINUED | OUTPATIENT
Start: 2024-11-29 | End: 2024-11-29 | Stop reason: HOSPADM

## 2024-11-29 RX ADMIN — BORTEZOMIB 2.5 MG: 3.5 INJECTION, POWDER, LYOPHILIZED, FOR SOLUTION INTRAVENOUS; SUBCUTANEOUS at 09:11

## 2024-11-29 NOTE — PLAN OF CARE
Problem: Adult Inpatient Plan of Care  Goal: Plan of Care Review  Outcome: Progressing  Goal: Patient-Specific Goal (Individualized)  Outcome: Progressing     Problem: Nausea and Vomiting (Chemotherapy Effects)  Goal: Fluid and Electrolyte Balance  Outcome: Progressing       Patient arrived to clinic today for SQ velcade injection. VS and assessment completed with no acute issues noted. Injection given and band aid applied. All questions answered, scheduled for next appt and left clinic in NAD.

## 2024-12-01 LAB
PATHOLOGIST INTERPRETATION IFE: NORMAL
PATHOLOGIST INTERPRETATION SPE: NORMAL

## 2024-12-14 ENCOUNTER — PATIENT MESSAGE (OUTPATIENT)
Dept: HEMATOLOGY/ONCOLOGY | Facility: CLINIC | Age: 64
End: 2024-12-14
Payer: COMMERCIAL

## 2024-12-16 DIAGNOSIS — C90.01 MULTIPLE MYELOMA IN REMISSION: Primary | ICD-10-CM

## 2024-12-16 NOTE — PROGRESS NOTES
Section of Hematology and Stem Cell Transplantation  Follow-Up Note     12/17/2024  Primary Oncologic Diagnosis: Multiple myeloma in remission [C90.01]    History of Present Ilness:   Vicente Steele) is a pleasant 64 y.o.male from [place], [place], here for follow up of IgA kappa multiple myeloma, s/p tandem autoSCT, on VRd maintenance. Past medical history of peripheral neuropathy, history of LE DVT on chronic anticoagulation, GERD, vertebral fracture.    Oncologic History:  Here for hematology follow up. He has recently moved to [place] from [place]. He has history of stage IIIA, [employer] stage II IgA kappa multiple myeloma, diagnosed in 2014, which is likely intermediate-risk based upon the presence of t(4;14) with hyperdiploidy, based on records available through care everywhere.   He was started on velcade, revlimid and dexamethasone at diagnosis.  Of note, he developed a popliteal DVT and was started on Lovenox and later switched Xarelto.   He completed a stem cell transplant with Dr Schuster in May 2015. He is s/p tandem autologous transplant in 2015 and has been on triple maintenance with bortezomib, lenalidomide and dexamethasone since then.  He has had multiple skeletal complications since his original diagnosis.  He had prolonged bout of diarrhea in January 2023. All stool studies were negative/ unremarkable. He had COVID 19 infection in March 2023. He received paxlovid.  MRI spine in March 2023 showed compression fracture of L1 with retropulsion of the posterior fragment as well as minimal compression fractures of the superior endplates of T8 and T11. MRI pelvis with facet arthropathy in the lower lumbar spine. Hip joints exhibiting bilateral chondral thinning with labral fraying and osteophyte production.      Interval History 12/17/2024:  Patient is here for follow up, he reports a small uptick in lower back pain, primarily noticeable at night. He does not take any analgesic for  this issue currently. His peripheral neuropathy is stable and unchanged. He did see his dentist, who did an MRI following his jaw pain, which was negative. Denies fever, chills, drenching night sweats, unexplained weight loss, early satiety, chest pain, shortness of breath, new/worsening bone pain, adenopathy, N/V/D.     Past Medical History, Social History, and Past Family History are unchanged since last evaluation except for HPI.    Past Medical History:   Diagnosis Date    Cancer     GERD (gastroesophageal reflux disease)     Mild hypertension 3/15/2021        CURRENT MEDICATIONS:   Current Outpatient Medications   Medication Sig    acyclovir (ZOVIRAX) 400 MG tablet Take 1 tablet (400 mg total) by mouth 2 (two) times daily.    bortezomib (VELCADE INJ) Inject as directed. Pt gets every month    calcium carb/vit D3/minerals (CALCIUM-VITAMIN D ORAL)     dexAMETHasone (DECADRON) 4 MG Tab TAKE 10 TABLETS (40 MG DOSE) BY MOUTH DAY OF AND DAY AFTER VELCADE once monthly    folic acid-vit B6-vit B12 2.5-25-2 mg (FOLBIC) 2.5-25-2 mg Tab Take 1 tablet by mouth once daily.    hydroCHLOROthiazide (HYDRODIURIL) 25 MG tablet Take 1 tablet (25 mg total) by mouth once daily.    magnesium oxide 500 mg Tab once daily.    multivitamin with minerals (MULTI-VITAMIN W/MINERALS ORAL)     omeprazole (PRILOSEC) 20 MG capsule     REVLIMID 10 mg Cap TAKE 1 CAPSULE ONCE DAILY ON DAYS 1 THROUGH 21 OF A 28 DAY CYCLE    rivaroxaban (XARELTO) 15 mg Tab Take 1 tablet (15 mg total) by mouth once daily.    sulfamethoxazole-trimethoprim 800-160mg (BACTRIM DS) 800-160 mg Tab TAKE 1 TABLET EVERY MONDAY, WEDNESDAY AND FRIDAY    zoledronic acid (ZOMETA IV) Inject into the vein. Pt gets every 3 months    lenalidomide 10 mg Cap Take 1 capsule by mouth once daily Auth [id number] 10/8/24 BRAND NAME ONLY NELLA-1.    triamcinolone acetonide 0.1% (KENALOG) 0.1 % ointment Apply topically 2 (two) times daily.     No current facility-administered medications for  this visit.       ALLERGIES:   Review of patient's allergies indicates:  No Known Allergies    Review of Systems:     Review of Systems   Constitutional:  Negative for chills, fever, malaise/fatigue and weight loss.   HENT:  Negative for congestion, nosebleeds, sinus pain and sore throat.    Eyes:  Negative for blurred vision, double vision, photophobia and pain.   Respiratory:  Negative for cough and shortness of breath.    Cardiovascular:  Negative for chest pain and palpitations.   Gastrointestinal:  Negative for constipation, diarrhea, heartburn, nausea and vomiting.   Genitourinary:  Negative for dysuria and hematuria.   Musculoskeletal:  Positive for back pain. Negative for falls.   Skin:  Negative for rash.   Neurological:  Positive for sensory change. Negative for dizziness, weakness and headaches.   Endo/Heme/Allergies:  Negative for environmental allergies.   Psychiatric/Behavioral:  The patient does not have insomnia.        Physical Exam:     Vitals:    12/17/24 1304   BP: 136/71   Pulse: 74   Resp: 18       Physical Exam  Vitals reviewed.   Constitutional:       Appearance: Normal appearance.   HENT:      Head: Normocephalic and atraumatic.      Nose: Nose normal. No congestion.      Mouth/Throat:      Mouth: Mucous membranes are moist.      Pharynx: Oropharynx is clear. No oropharyngeal exudate.   Eyes:      Pupils: Pupils are equal, round, and reactive to light.   Cardiovascular:      Rate and Rhythm: Normal rate and regular rhythm.      Pulses: Normal pulses.      Heart sounds: Normal heart sounds. No murmur heard.  Pulmonary:      Effort: Pulmonary effort is normal.      Breath sounds: Normal breath sounds. No wheezing.   Abdominal:      General: Bowel sounds are normal.      Palpations: Abdomen is soft.   Musculoskeletal:         General: Normal range of motion.      Cervical back: Normal range of motion and neck supple.      Right lower leg: No edema.      Left lower leg: No edema.   Skin:      General: Skin is warm and dry.      Findings: No bruising, lesion or rash.   Neurological:      Mental Status: He is alert and oriented to person, place, and time.      Motor: No weakness.   Psychiatric:         Mood and Affect: Mood normal.         Thought Content: Thought content normal.          ECOG Performance Status: (foot note - ECOG PS provided by Eastern Cooperative Oncology Group) 1 - Symptomatic but completely ambulatory    Karnofsky Performance Score:  90%- Able to Carry on Normal Activity: Minor Symptoms of Disease    Labs:   Lab Results   Component Value Date    WBC 3.59 (L) 12/17/2024    HGB 11.9 (L) 12/17/2024    HCT 35.9 (L) 12/17/2024    MCV 94 12/17/2024     12/17/2024       Lab Results   Component Value Date     12/17/2024    K 3.5 12/17/2024     12/17/2024    CO2 24 12/17/2024    BUN 13 12/17/2024    CREATININE 0.9 12/17/2024    ALBUMIN 3.2 (L) 12/17/2024    BILITOT 1.0 12/17/2024    ALKPHOS 65 12/17/2024    AST 27 12/17/2024    ALT 24 12/17/2024       Imaging:   None     Pathology:  None     Assessment and Plan:     Problem List Items Addressed This Visit       Multiple myeloma in remission/History of autoSCT  - Stage IIIA, ISS II IgA kappa multiple myeloma, which is likely intermediate-risk based upon the presence of t(4;14) with hyperdiploidy   - S/ p tandem auto stem cell transplant in 2015 with a good biochemical remission  - Now on triple maintenance: velcade 1.3 mg/m2 sub q monthly, dex 40 mg PO day of and day after monthly velcade, revlimid 10 mg  PO for 21 days on / 7 days off every 28 days  - 11/27/24 biochemical studies/labs cw with CR and no CRAB  - Systemic restaging completed to reassess MRD status for possible therapy deescalation in light of almost 10 years since SCT; he was in agreement, results below   - 11/27/24 marrow shows 10e-5 MRD negative sCR; good news shared with pt; he expressed hesitancy over stopping maintenance  - Discussed I would reach out to  MDs at [facility] for their opinion and get back to him; email sent again today  - Plan to continue maintenance as above for now  - Last zometa given 4/15/24, q3 months, was previously on hold due to jaw pain, but has been cleared by dentist after extensive evaluation including MRI. Will plan for next dose 1/31.      Cancer associated pain  - History of vertebral compression fracture  - Chronic, stable, unchanged, not on analgesic at this time      Immunodeficiency due to drugs      - Secondary to maintenance chemotherapy  - Continue acyclovir two times daily  - Post-transplant vaccines complete      Other thrombophilia  - History of DVT  - Increased risk of clots with ongoing revlimid  - Continue xarelto 15 mg daily to prevent VTE      Anemia in neoplastic disease      - Secondary to chemotherapy  - Mild, stable, asymptomatic      Leukopenia due to antineoplastic chemotherapy      - Secondary to chemotherapy  - Stable, no recent infections      Healthcare maintenance      - Colonoscopy 2011 and 2017, repeat 2022   - PSA  up to date   - Covid vaccine 3/31 pfizer booster done, second booster 2/17/22  - Recommend COVID booster       BMT Chart Routing      Follow up with physician    Follow up with SIGRID 2 months. NICOLAS Hinton follow up   Provider visit type    Infusion scheduling note   Sikhism: Zometa 1/31   Injection scheduling note Sikhism: Velcade 1/31/25, 2/28/25, 3/28   Labs CBC, CMP, free light chains, immunofixation, immunoglobulins and SPEP   Scheduling:  Preferred lab:  Lab interval: every 4 weeks  Sikhism: next on 1/30/24, 2/27, 3/27 day before infusion   Imaging    Pharmacy appointment    Other referrals                    Orders Placed:      Orders Placed This Encounter    CBC Auto Differential    Comprehensive Metabolic Panel    Immunofixation Electrophoresis    Immunoglobulin Free LT Chains Blood    Immunoglobulins (IgG, IgA, IgM) Quantitative    Protein Electrophoresis, Serum       Total time of this visit was 30  minutes, including time spent face to face with patient and/or via video/audio, and also in preparing for today's visit for MDM and documentation. (Medical Decision Making, including consideration of possible diagnoses, management options, complex medical record review, review of diagnostic tests and information, consideration and discussion of significant complications based on comorbidities, and discussion with providers involved with the care of the patient). Greater than 50% was spent face to face with the patient counseling and coordinating care.    Thank you for allowing me to partake in the care of this patient. If there are any questions, please do not hesitate to reach out.    Mary Jane Shirley, FNP-C  Hematologic Malignancies, Stem Cell Transplantation, and Cellular Therapy  [facility]

## 2024-12-17 ENCOUNTER — OFFICE VISIT (OUTPATIENT)
Dept: HEMATOLOGY/ONCOLOGY | Facility: CLINIC | Age: 64
End: 2024-12-17
Payer: COMMERCIAL

## 2024-12-17 ENCOUNTER — LAB VISIT (OUTPATIENT)
Dept: LAB | Facility: HOSPITAL | Age: 64
End: 2024-12-17
Attending: INTERNAL MEDICINE
Payer: COMMERCIAL

## 2024-12-17 VITALS
DIASTOLIC BLOOD PRESSURE: 71 MMHG | WEIGHT: 179.81 LBS | SYSTOLIC BLOOD PRESSURE: 136 MMHG | RESPIRATION RATE: 18 BRPM | HEIGHT: 68 IN | OXYGEN SATURATION: 96 % | HEART RATE: 74 BPM | BODY MASS INDEX: 27.25 KG/M2

## 2024-12-17 DIAGNOSIS — Z87.81 HISTORY OF VERTEBRAL COMPRESSION FRACTURE: ICD-10-CM

## 2024-12-17 DIAGNOSIS — C90.01 MULTIPLE MYELOMA IN REMISSION: Primary | ICD-10-CM

## 2024-12-17 DIAGNOSIS — Z94.84 HISTORY OF AUTO STEM CELL TRANSPLANT: ICD-10-CM

## 2024-12-17 DIAGNOSIS — G89.3 CANCER ASSOCIATED PAIN: ICD-10-CM

## 2024-12-17 DIAGNOSIS — Z79.899 IMMUNODEFICIENCY DUE TO DRUGS: ICD-10-CM

## 2024-12-17 DIAGNOSIS — D63.0 ANEMIA IN NEOPLASTIC DISEASE: ICD-10-CM

## 2024-12-17 DIAGNOSIS — T45.1X5A LEUKOPENIA DUE TO ANTINEOPLASTIC CHEMOTHERAPY: ICD-10-CM

## 2024-12-17 DIAGNOSIS — D70.1 LEUKOPENIA DUE TO ANTINEOPLASTIC CHEMOTHERAPY: ICD-10-CM

## 2024-12-17 DIAGNOSIS — D68.69 OTHER THROMBOPHILIA: ICD-10-CM

## 2024-12-17 DIAGNOSIS — Z00.00 HEALTHCARE MAINTENANCE: ICD-10-CM

## 2024-12-17 DIAGNOSIS — C90.01 MULTIPLE MYELOMA IN REMISSION: ICD-10-CM

## 2024-12-17 DIAGNOSIS — D84.821 IMMUNODEFICIENCY DUE TO DRUGS: ICD-10-CM

## 2024-12-17 LAB
ALBUMIN SERPL BCP-MCNC: 3.2 G/DL (ref 3.5–5.2)
ALP SERPL-CCNC: 65 U/L (ref 40–150)
ALT SERPL W/O P-5'-P-CCNC: 24 U/L (ref 10–44)
ANION GAP SERPL CALC-SCNC: 9 MMOL/L (ref 8–16)
AST SERPL-CCNC: 27 U/L (ref 10–40)
BASOPHILS # BLD AUTO: 0.03 K/UL (ref 0–0.2)
BASOPHILS NFR BLD: 0.8 % (ref 0–1.9)
BILIRUB SERPL-MCNC: 1 MG/DL (ref 0.1–1)
BUN SERPL-MCNC: 13 MG/DL (ref 8–23)
CALCIUM SERPL-MCNC: 9 MG/DL (ref 8.7–10.5)
CHLORIDE SERPL-SCNC: 105 MMOL/L (ref 95–110)
CO2 SERPL-SCNC: 24 MMOL/L (ref 23–29)
CREAT SERPL-MCNC: 0.9 MG/DL (ref 0.5–1.4)
DIFFERENTIAL METHOD BLD: ABNORMAL
EOSINOPHIL # BLD AUTO: 0 K/UL (ref 0–0.5)
EOSINOPHIL NFR BLD: 1.1 % (ref 0–8)
ERYTHROCYTE [DISTWIDTH] IN BLOOD BY AUTOMATED COUNT: 14.9 % (ref 11.5–14.5)
EST. GFR  (NO RACE VARIABLE): >60 ML/MIN/1.73 M^2
GLUCOSE SERPL-MCNC: 93 MG/DL (ref 70–110)
HCT VFR BLD AUTO: 35.9 % (ref 40–54)
HGB BLD-MCNC: 11.9 G/DL (ref 14–18)
IGA SERPL-MCNC: 162 MG/DL (ref 40–350)
IGG SERPL-MCNC: 611 MG/DL (ref 650–1600)
IGM SERPL-MCNC: 12 MG/DL (ref 50–300)
IMM GRANULOCYTES # BLD AUTO: 0.01 K/UL (ref 0–0.04)
IMM GRANULOCYTES NFR BLD AUTO: 0.3 % (ref 0–0.5)
LYMPHOCYTES # BLD AUTO: 1.3 K/UL (ref 1–4.8)
LYMPHOCYTES NFR BLD: 37 % (ref 18–48)
MCH RBC QN AUTO: 31.1 PG (ref 27–31)
MCHC RBC AUTO-ENTMCNC: 33.1 G/DL (ref 32–36)
MCV RBC AUTO: 94 FL (ref 82–98)
MONOCYTES # BLD AUTO: 0.5 K/UL (ref 0.3–1)
MONOCYTES NFR BLD: 13.6 % (ref 4–15)
NEUTROPHILS # BLD AUTO: 1.7 K/UL (ref 1.8–7.7)
NEUTROPHILS NFR BLD: 47.2 % (ref 38–73)
NRBC BLD-RTO: 0 /100 WBC
PLATELET # BLD AUTO: 166 K/UL (ref 150–450)
PMV BLD AUTO: 9.8 FL (ref 9.2–12.9)
POTASSIUM SERPL-SCNC: 3.5 MMOL/L (ref 3.5–5.1)
PROT SERPL-MCNC: 6.3 G/DL (ref 6–8.4)
RBC # BLD AUTO: 3.83 M/UL (ref 4.6–6.2)
SODIUM SERPL-SCNC: 138 MMOL/L (ref 136–145)
WBC # BLD AUTO: 3.59 K/UL (ref 3.9–12.7)

## 2024-12-17 PROCEDURE — 84165 PROTEIN E-PHORESIS SERUM: CPT | Performed by: INTERNAL MEDICINE

## 2024-12-17 PROCEDURE — 1159F MED LIST DOCD IN RCRD: CPT | Mod: CPTII,S$GLB,,

## 2024-12-17 PROCEDURE — 3288F FALL RISK ASSESSMENT DOCD: CPT | Mod: CPTII,S$GLB,,

## 2024-12-17 PROCEDURE — 3075F SYST BP GE 130 - 139MM HG: CPT | Mod: CPTII,S$GLB,,

## 2024-12-17 PROCEDURE — 83521 IG LIGHT CHAINS FREE EACH: CPT | Mod: 59 | Performed by: INTERNAL MEDICINE

## 2024-12-17 PROCEDURE — 1101F PT FALLS ASSESS-DOCD LE1/YR: CPT | Mod: CPTII,S$GLB,,

## 2024-12-17 PROCEDURE — 3078F DIAST BP <80 MM HG: CPT | Mod: CPTII,S$GLB,,

## 2024-12-17 PROCEDURE — 99214 OFFICE O/P EST MOD 30 MIN: CPT | Mod: S$GLB,,,

## 2024-12-17 PROCEDURE — 36415 COLL VENOUS BLD VENIPUNCTURE: CPT | Performed by: INTERNAL MEDICINE

## 2024-12-17 PROCEDURE — 85025 COMPLETE CBC W/AUTO DIFF WBC: CPT | Performed by: INTERNAL MEDICINE

## 2024-12-17 PROCEDURE — 80053 COMPREHEN METABOLIC PANEL: CPT | Performed by: INTERNAL MEDICINE

## 2024-12-17 PROCEDURE — 3008F BODY MASS INDEX DOCD: CPT | Mod: CPTII,S$GLB,,

## 2024-12-17 PROCEDURE — 82784 ASSAY IGA/IGD/IGG/IGM EACH: CPT | Mod: 59 | Performed by: INTERNAL MEDICINE

## 2024-12-17 PROCEDURE — 84165 PROTEIN E-PHORESIS SERUM: CPT | Mod: 26,,, | Performed by: PATHOLOGY

## 2024-12-17 PROCEDURE — 86334 IMMUNOFIX E-PHORESIS SERUM: CPT | Mod: 26,,, | Performed by: PATHOLOGY

## 2024-12-17 PROCEDURE — 99999 PR PBB SHADOW E&M-EST. PATIENT-LVL IV: CPT | Mod: PBBFAC,,,

## 2024-12-17 PROCEDURE — 86334 IMMUNOFIX E-PHORESIS SERUM: CPT

## 2024-12-18 ENCOUNTER — PATIENT MESSAGE (OUTPATIENT)
Dept: HEMATOLOGY/ONCOLOGY | Facility: CLINIC | Age: 64
End: 2024-12-18
Payer: COMMERCIAL

## 2024-12-18 LAB
ALBUMIN SERPL ELPH-MCNC: 3.41 G/DL (ref 3.35–5.55)
ALPHA1 GLOB SERPL ELPH-MCNC: 0.33 G/DL (ref 0.17–0.41)
ALPHA2 GLOB SERPL ELPH-MCNC: 0.75 G/DL (ref 0.43–0.99)
B-GLOBULIN SERPL ELPH-MCNC: 0.81 G/DL (ref 0.5–1.1)
GAMMA GLOB SERPL ELPH-MCNC: 0.6 G/DL (ref 0.67–1.58)
INTERPRETATION SERPL IFE-IMP: NORMAL
KAPPA LC SER QL IA: 1.23 MG/DL (ref 0.33–1.94)
KAPPA LC/LAMBDA SER IA: 1.35 (ref 0.26–1.65)
LAMBDA LC SER QL IA: 0.91 MG/DL (ref 0.57–2.63)
PATHOLOGIST INTERPRETATION IFE: NORMAL
PATHOLOGIST INTERPRETATION SPE: NORMAL
PROT SERPL-MCNC: 5.9 G/DL (ref 6–8.4)

## 2024-12-19 ENCOUNTER — PATIENT MESSAGE (OUTPATIENT)
Dept: HEMATOLOGY/ONCOLOGY | Facility: CLINIC | Age: 64
End: 2024-12-19
Payer: COMMERCIAL

## 2024-12-19 DIAGNOSIS — C90.01 MULTIPLE MYELOMA IN REMISSION: ICD-10-CM

## 2024-12-19 RX ORDER — LENALIDOMIDE 10 MG/1
10 CAPSULE ORAL DAILY
Qty: 21 CAPSULE | Refills: 0 | Status: SHIPPED | OUTPATIENT
Start: 2024-12-19 | End: 2025-01-09

## 2025-01-03 ENCOUNTER — INFUSION (OUTPATIENT)
Dept: INFUSION THERAPY | Facility: OTHER | Age: 65
End: 2025-01-03
Attending: INTERNAL MEDICINE
Payer: COMMERCIAL

## 2025-01-03 VITALS
BODY MASS INDEX: 27.06 KG/M2 | WEIGHT: 178.56 LBS | HEIGHT: 68 IN | RESPIRATION RATE: 16 BRPM | OXYGEN SATURATION: 96 % | SYSTOLIC BLOOD PRESSURE: 141 MMHG | TEMPERATURE: 98 F | HEART RATE: 83 BPM | DIASTOLIC BLOOD PRESSURE: 76 MMHG

## 2025-01-03 DIAGNOSIS — C90.01 MULTIPLE MYELOMA IN REMISSION: Primary | ICD-10-CM

## 2025-01-03 PROCEDURE — 96401 CHEMO ANTI-NEOPL SQ/IM: CPT

## 2025-01-03 PROCEDURE — 63600175 PHARM REV CODE 636 W HCPCS: Performed by: INTERNAL MEDICINE

## 2025-01-03 RX ORDER — HEPARIN 100 UNIT/ML
500 SYRINGE INTRAVENOUS
Status: DISCONTINUED | OUTPATIENT
Start: 2025-01-03 | End: 2025-01-03 | Stop reason: HOSPADM

## 2025-01-03 RX ORDER — BORTEZOMIB 3.5 MG/1
1.3 INJECTION, POWDER, LYOPHILIZED, FOR SOLUTION INTRAVENOUS; SUBCUTANEOUS
Status: COMPLETED | OUTPATIENT
Start: 2025-01-03 | End: 2025-01-03

## 2025-01-03 RX ORDER — SODIUM CHLORIDE 0.9 % (FLUSH) 0.9 %
10 SYRINGE (ML) INJECTION
Status: DISCONTINUED | OUTPATIENT
Start: 2025-01-03 | End: 2025-01-03 | Stop reason: HOSPADM

## 2025-01-03 RX ADMIN — BORTEZOMIB 2.5 MG: 3.5 INJECTION, POWDER, LYOPHILIZED, FOR SOLUTION INTRAVENOUS; SUBCUTANEOUS at 08:01

## 2025-01-07 DIAGNOSIS — C90.01 MULTIPLE MYELOMA IN REMISSION: ICD-10-CM

## 2025-01-07 NOTE — TELEPHONE ENCOUNTER
----- Message from Kofi sent at 1/7/2025 11:43 AM CST -----  Regarding: Consult/Advisory  Contact: [phone]  Consult/Advisory     Name Of Caller: [facility]        Contact Preference: [phone]     Nature of call: Calling to see if Dr. Coronel was able to get the prior Authorization for the pt REVLIMID 10 mg Cap before her left. Please call back to further assist.

## 2025-01-08 RX ORDER — LENALIDOMIDE 10 MG/1
10 CAPSULE ORAL DAILY
Qty: 21 EACH | Refills: 0 | Status: ACTIVE | OUTPATIENT
Start: 2025-01-08

## 2025-01-15 DIAGNOSIS — C90.01 MULTIPLE MYELOMA IN REMISSION: ICD-10-CM

## 2025-01-15 NOTE — TELEPHONE ENCOUNTER
----- Message from Rose Mary sent at 1/15/2025  2:18 PM CST -----  Type:  Needs Medical Advice    Who Called: [facility]  Symptoms (please be specific):    How long has patient had these symptoms:    Pharmacy name and phone #:  [phone] Reference   Would the patient rather a call back or a response via [facility]? call  Best Call Back Number:   Additional Information: Medication needs an authorization notification or a new script

## 2025-01-16 DIAGNOSIS — I10 ESSENTIAL HYPERTENSION: ICD-10-CM

## 2025-01-16 RX ORDER — LENALIDOMIDE 10 MG/1
10 CAPSULE ORAL DAILY
Qty: 21 EACH | Refills: 0 | Status: SHIPPED | OUTPATIENT
Start: 2025-01-16

## 2025-01-16 RX ORDER — HYDROCHLOROTHIAZIDE 25 MG/1
25 TABLET ORAL
Qty: 90 TABLET | Refills: 2 | Status: SHIPPED | OUTPATIENT
Start: 2025-01-16

## 2025-01-16 NOTE — TELEPHONE ENCOUNTER
Refill Routing Note   Medication(s) are not appropriate for processing by [facility] for the following reason(s):        Required vitals abnormal    [facility] action(s):  Defer               Appointments  past 12m or future 3m with PCP    Date Provider   Last Visit   10/21/2024 Norris Pantoja MD   Next Visit   Visit date not found Norris Pantoja MD   ED visits in past 90 days: 0        Note composed:6:43 AM 01/16/2025

## 2025-01-16 NOTE — TELEPHONE ENCOUNTER
No care due was identified.  [facility] Embedded Care Due Messages. Reference number: [id number].   1/16/2025 3:20:30 AM CST

## 2025-01-30 ENCOUNTER — TELEPHONE (OUTPATIENT)
Dept: HEMATOLOGY/ONCOLOGY | Facility: CLINIC | Age: 65
End: 2025-01-30
Payer: COMMERCIAL

## 2025-01-30 ENCOUNTER — LAB VISIT (OUTPATIENT)
Dept: LAB | Facility: OTHER | Age: 65
End: 2025-01-30
Attending: INTERNAL MEDICINE
Payer: COMMERCIAL

## 2025-01-30 DIAGNOSIS — Z94.84 HISTORY OF AUTO STEM CELL TRANSPLANT: ICD-10-CM

## 2025-01-30 DIAGNOSIS — C90.01 MULTIPLE MYELOMA IN REMISSION: ICD-10-CM

## 2025-01-30 LAB
ALBUMIN SERPL BCP-MCNC: 3.3 G/DL (ref 3.5–5.2)
ALP SERPL-CCNC: 54 U/L (ref 40–150)
ALT SERPL W/O P-5'-P-CCNC: 26 U/L (ref 10–44)
ANION GAP SERPL CALC-SCNC: 10 MMOL/L (ref 8–16)
AST SERPL-CCNC: 28 U/L (ref 10–40)
BASOPHILS # BLD AUTO: 0.02 K/UL (ref 0–0.2)
BASOPHILS NFR BLD: 0.6 % (ref 0–1.9)
BILIRUB SERPL-MCNC: 1.3 MG/DL (ref 0.1–1)
BUN SERPL-MCNC: 15 MG/DL (ref 8–23)
CALCIUM SERPL-MCNC: 9.1 MG/DL (ref 8.7–10.5)
CHLORIDE SERPL-SCNC: 106 MMOL/L (ref 95–110)
CO2 SERPL-SCNC: 25 MMOL/L (ref 23–29)
CREAT SERPL-MCNC: 1 MG/DL (ref 0.5–1.4)
DIFFERENTIAL METHOD BLD: ABNORMAL
EOSINOPHIL # BLD AUTO: 0.1 K/UL (ref 0–0.5)
EOSINOPHIL NFR BLD: 3 % (ref 0–8)
ERYTHROCYTE [DISTWIDTH] IN BLOOD BY AUTOMATED COUNT: 15.9 % (ref 11.5–14.5)
EST. GFR  (NO RACE VARIABLE): >60 ML/MIN/1.73 M^2
GLUCOSE SERPL-MCNC: 92 MG/DL (ref 70–110)
HCT VFR BLD AUTO: 35 % (ref 40–54)
HGB BLD-MCNC: 11.7 G/DL (ref 14–18)
IGA SERPL-MCNC: 175 MG/DL (ref 40–350)
IGG SERPL-MCNC: 661 MG/DL (ref 650–1600)
IGM SERPL-MCNC: 15 MG/DL (ref 50–300)
IMM GRANULOCYTES # BLD AUTO: 0.01 K/UL (ref 0–0.04)
IMM GRANULOCYTES NFR BLD AUTO: 0.3 % (ref 0–0.5)
LYMPHOCYTES # BLD AUTO: 1.4 K/UL (ref 1–4.8)
LYMPHOCYTES NFR BLD: 43.9 % (ref 18–48)
MCH RBC QN AUTO: 30.7 PG (ref 27–31)
MCHC RBC AUTO-ENTMCNC: 33.4 G/DL (ref 32–36)
MCV RBC AUTO: 92 FL (ref 82–98)
MONOCYTES # BLD AUTO: 0.5 K/UL (ref 0.3–1)
MONOCYTES NFR BLD: 15.9 % (ref 4–15)
NEUTROPHILS # BLD AUTO: 1.2 K/UL (ref 1.8–7.7)
NEUTROPHILS NFR BLD: 36.3 % (ref 38–73)
NRBC BLD-RTO: 0 /100 WBC
PLATELET # BLD AUTO: 158 K/UL (ref 150–450)
PMV BLD AUTO: 9.9 FL (ref 9.2–12.9)
POTASSIUM SERPL-SCNC: 3.7 MMOL/L (ref 3.5–5.1)
PROT SERPL-MCNC: 6.3 G/DL (ref 6–8.4)
RBC # BLD AUTO: 3.81 M/UL (ref 4.6–6.2)
SODIUM SERPL-SCNC: 141 MMOL/L (ref 136–145)
WBC # BLD AUTO: 3.28 K/UL (ref 3.9–12.7)

## 2025-01-30 PROCEDURE — 86334 IMMUNOFIX E-PHORESIS SERUM: CPT | Mod: 26,,, | Performed by: PATHOLOGY

## 2025-01-30 PROCEDURE — 84165 PROTEIN E-PHORESIS SERUM: CPT

## 2025-01-30 PROCEDURE — 36415 COLL VENOUS BLD VENIPUNCTURE: CPT

## 2025-01-30 PROCEDURE — 80053 COMPREHEN METABOLIC PANEL: CPT

## 2025-01-30 PROCEDURE — 84165 PROTEIN E-PHORESIS SERUM: CPT | Mod: 26,,, | Performed by: PATHOLOGY

## 2025-01-30 PROCEDURE — 83521 IG LIGHT CHAINS FREE EACH: CPT

## 2025-01-30 PROCEDURE — 82784 ASSAY IGA/IGD/IGG/IGM EACH: CPT | Mod: 59

## 2025-01-30 PROCEDURE — 85025 COMPLETE CBC W/AUTO DIFF WBC: CPT

## 2025-01-30 PROCEDURE — 86334 IMMUNOFIX E-PHORESIS SERUM: CPT

## 2025-01-30 RX ORDER — HEPARIN 100 UNIT/ML
500 SYRINGE INTRAVENOUS
Status: CANCELLED | OUTPATIENT
Start: 2025-01-31

## 2025-01-30 RX ORDER — BORTEZOMIB 3.5 MG/1
1.3 INJECTION, POWDER, LYOPHILIZED, FOR SOLUTION INTRAVENOUS; SUBCUTANEOUS
Status: CANCELLED | OUTPATIENT
Start: 2025-01-31

## 2025-01-30 RX ORDER — SODIUM CHLORIDE 0.9 % (FLUSH) 0.9 %
10 SYRINGE (ML) INJECTION
Status: CANCELLED | OUTPATIENT
Start: 2025-01-31

## 2025-01-30 RX ORDER — ZOLEDRONIC ACID 0.04 MG/ML
4 INJECTION, SOLUTION INTRAVENOUS
Status: CANCELLED | OUTPATIENT
Start: 2025-01-31

## 2025-01-30 NOTE — TELEPHONE ENCOUNTER
----- Message from Chanelle sent at 1/30/2025 12:53 PM CST -----  Regarding: PA Needed  Type:  Pharmacy Calling to Clarify an RX    Name of Caller:Emi     Pharmacy Name:    [facility] [address] [address]  Phone: [phone] Fax: [phone]        Prescription Name:lenalidomide 10 mg Cap    What do they need to clarify?:Pa needed for refill.    Best Call Back Number:[phone] Key #[id number]    Additional Information:

## 2025-01-31 ENCOUNTER — INFUSION (OUTPATIENT)
Dept: INFUSION THERAPY | Facility: OTHER | Age: 65
End: 2025-01-31
Attending: INTERNAL MEDICINE
Payer: COMMERCIAL

## 2025-01-31 VITALS
OXYGEN SATURATION: 95 % | HEART RATE: 81 BPM | BODY MASS INDEX: 27.15 KG/M2 | HEIGHT: 68 IN | SYSTOLIC BLOOD PRESSURE: 140 MMHG | TEMPERATURE: 99 F | RESPIRATION RATE: 16 BRPM | DIASTOLIC BLOOD PRESSURE: 76 MMHG

## 2025-01-31 DIAGNOSIS — C90.01 MULTIPLE MYELOMA IN REMISSION: Primary | ICD-10-CM

## 2025-01-31 LAB
ALBUMIN SERPL ELPH-MCNC: 3.62 G/DL (ref 3.35–5.55)
ALPHA1 GLOB SERPL ELPH-MCNC: 0.27 G/DL (ref 0.17–0.41)
ALPHA2 GLOB SERPL ELPH-MCNC: 0.56 G/DL (ref 0.43–0.99)
B-GLOBULIN SERPL ELPH-MCNC: 0.85 G/DL (ref 0.5–1.1)
GAMMA GLOB SERPL ELPH-MCNC: 0.69 G/DL (ref 0.67–1.58)
INTERPRETATION SERPL IFE-IMP: NORMAL
KAPPA LC SER QL IA: 1.62 MG/DL (ref 0.33–1.94)
KAPPA LC/LAMBDA SER IA: 1.45 (ref 0.26–1.65)
LAMBDA LC SER QL IA: 1.12 MG/DL (ref 0.57–2.63)
PROT SERPL-MCNC: 6 G/DL (ref 6–8.4)

## 2025-01-31 PROCEDURE — 63600175 PHARM REV CODE 636 W HCPCS

## 2025-01-31 PROCEDURE — 96374 THER/PROPH/DIAG INJ IV PUSH: CPT

## 2025-01-31 PROCEDURE — 25000003 PHARM REV CODE 250

## 2025-01-31 PROCEDURE — 96401 CHEMO ANTI-NEOPL SQ/IM: CPT

## 2025-01-31 RX ORDER — SODIUM CHLORIDE 0.9 % (FLUSH) 0.9 %
10 SYRINGE (ML) INJECTION
Status: DISCONTINUED | OUTPATIENT
Start: 2025-01-31 | End: 2025-01-31 | Stop reason: HOSPADM

## 2025-01-31 RX ORDER — BORTEZOMIB 3.5 MG/1
1.3 INJECTION, POWDER, LYOPHILIZED, FOR SOLUTION INTRAVENOUS; SUBCUTANEOUS
Status: COMPLETED | OUTPATIENT
Start: 2025-01-31 | End: 2025-01-31

## 2025-01-31 RX ORDER — HEPARIN 100 UNIT/ML
500 SYRINGE INTRAVENOUS
Status: DISCONTINUED | OUTPATIENT
Start: 2025-01-31 | End: 2025-01-31 | Stop reason: HOSPADM

## 2025-01-31 RX ADMIN — SODIUM CHLORIDE: 9 INJECTION, SOLUTION INTRAVENOUS at 08:01

## 2025-01-31 RX ADMIN — ZOLEDRONIC ACID 4 MG: 4 INJECTION, SOLUTION, CONCENTRATE INTRAVENOUS at 09:01

## 2025-01-31 RX ADMIN — BORTEZOMIB 2.5 MG: 3.5 INJECTION, POWDER, LYOPHILIZED, FOR SOLUTION INTRAVENOUS; SUBCUTANEOUS at 09:01

## 2025-01-31 NOTE — PLAN OF CARE
Velcade injection to abdomen and Zometa infusion complete. Pt tolerated well. VSS. NAD. IV to right AC DC'd. Next infusion appointment confirmed. Pt verbalized understanding of discharge instructions before leaving.

## 2025-02-01 LAB
PATHOLOGIST INTERPRETATION IFE: NORMAL
PATHOLOGIST INTERPRETATION SPE: NORMAL

## 2025-02-13 ENCOUNTER — OFFICE VISIT (OUTPATIENT)
Dept: URGENT CARE | Facility: CLINIC | Age: 65
End: 2025-02-13
Payer: COMMERCIAL

## 2025-02-13 VITALS
BODY MASS INDEX: 26.52 KG/M2 | OXYGEN SATURATION: 96 % | DIASTOLIC BLOOD PRESSURE: 78 MMHG | TEMPERATURE: 98 F | RESPIRATION RATE: 18 BRPM | HEART RATE: 85 BPM | WEIGHT: 175 LBS | HEIGHT: 68 IN | SYSTOLIC BLOOD PRESSURE: 143 MMHG

## 2025-02-13 DIAGNOSIS — H66.003 NON-RECURRENT ACUTE SUPPURATIVE OTITIS MEDIA OF BOTH EARS WITHOUT SPONTANEOUS RUPTURE OF TYMPANIC MEMBRANES: Primary | ICD-10-CM

## 2025-02-13 DIAGNOSIS — J02.9 SORE THROAT: ICD-10-CM

## 2025-02-13 LAB
CTP QC/QA: YES
CTP QC/QA: YES
POC MOLECULAR INFLUENZA A AGN: NEGATIVE
POC MOLECULAR INFLUENZA B AGN: NEGATIVE
SARS CORONAVIRUS 2 ANTIGEN: NEGATIVE

## 2025-02-13 PROCEDURE — 87502 INFLUENZA DNA AMP PROBE: CPT | Mod: QW,S$GLB,, | Performed by: NURSE PRACTITIONER

## 2025-02-13 RX ORDER — AMOXICILLIN AND CLAVULANATE POTASSIUM 875; 125 MG/1; MG/1
1 TABLET, FILM COATED ORAL 2 TIMES DAILY
Qty: 20 TABLET | Refills: 0 | Status: SHIPPED | OUTPATIENT
Start: 2025-02-13 | End: 2025-02-23

## 2025-02-13 NOTE — PROGRESS NOTES
"Subjective:      Patient ID: Vicente Connors is a 64 y.o. male.    Vitals:  height is 5' 8" (1.727 m) and weight is 79.4 kg (175 lb). His oral temperature is 98.4 °F (36.9 °C). His blood pressure is 143/78 (abnormal) and his pulse is 85. His respiration is 18 and oxygen saturation is 96%.     Chief Complaint: Sore Throat    Patient is a 63 y/o male presenting with swollen glands,cough. Onset of symptoms was yesterday late afternoon.  Patient report using tylenol with mild relief.  States that last night he had intense ear pain in right ear that has now spread to left ear and glands.  No fever.  He in on monthly injected chemo and oral chemo for a history of Multiple Myeloma.  He flew last week with no issue.  States that he's had a mild cough for about a week.    Sore Throat   This is a new problem. The current episode started yesterday. The problem has been unchanged. Sore throat worse side: both. There has been no fever. The pain is at a severity of 4/10. The pain is mild. Associated symptoms include coughing, ear pain, headaches, a plugged ear sensation and swollen glands. Pertinent negatives include no abdominal pain, congestion, diarrhea, drooling, ear discharge, hoarse voice, neck pain, shortness of breath, stridor, trouble swallowing or vomiting. He has tried NSAIDs for the symptoms. The treatment provided mild relief.       Constitution: Negative for chills, fatigue and fever.   HENT:  Positive for ear pain and sore throat. Negative for ear discharge, drooling, congestion and trouble swallowing.    Neck: Negative for neck pain.   Respiratory:  Positive for cough. Negative for chest tightness, sputum production, shortness of breath and stridor.    Gastrointestinal:  Negative for abdominal pain, vomiting and diarrhea.   Neurological:  Positive for headaches.      Objective:     Physical Exam   Constitutional: He is oriented to person, place, and time. He appears well-developed. He is cooperative.  Non-toxic " appearance. He does not appear ill. No distress.   HENT:   Head: Normocephalic and atraumatic.   Ears:   Right Ear: Hearing, external ear and ear canal normal. Tympanic membrane is erythematous and bulging. A middle ear effusion is present.   Left Ear: Hearing, external ear and ear canal normal. Tympanic membrane is erythematous and bulging. A middle ear effusion is present.   Nose: Nose normal. No mucosal edema, rhinorrhea or nasal deformity. No epistaxis. Right sinus exhibits no maxillary sinus tenderness and no frontal sinus tenderness. Left sinus exhibits no maxillary sinus tenderness and no frontal sinus tenderness.   Mouth/Throat: Uvula is midline, oropharynx is clear and moist and mucous membranes are normal. No trismus in the jaw. Normal dentition. No uvula swelling. No oropharyngeal exudate, posterior oropharyngeal edema or posterior oropharyngeal erythema.   Eyes: Conjunctivae and lids are normal. No scleral icterus.   Neck: Trachea normal and phonation normal. Neck supple. No edema present. No erythema present. No neck rigidity present.   Cardiovascular: Normal rate, regular rhythm, normal heart sounds and normal pulses.   Pulmonary/Chest: Effort normal and breath sounds normal. No respiratory distress. He has no decreased breath sounds. He has no rhonchi.   Abdominal: Normal appearance.   Musculoskeletal: Normal range of motion.         General: No deformity. Normal range of motion.   Neurological: He is alert and oriented to person, place, and time. He exhibits normal muscle tone. Coordination normal.   Skin: Skin is warm, dry, intact, not diaphoretic and not pale.   Psychiatric: His speech is normal and behavior is normal. Judgment and thought content normal.   Nursing note and vitals reviewed.      Assessment:     1. Non-recurrent acute suppurative otitis media of both ears without spontaneous rupture of tympanic membranes    2. Sore throat        Plan:     Results for orders placed or performed in  visit on 02/13/25   POCT Influenza A/B MOLECULAR    Collection Time: 02/13/25  9:26 AM   Result Value Ref Range    POC Molecular Influenza A Ag Negative Negative    POC Molecular Influenza B Ag Negative Negative     Acceptable Yes    SARS Coronavirus 2 Antigen, POCT Manual Read    Collection Time: 02/13/25  9:26 AM   Result Value Ref Range    SARS Coronavirus 2 Antigen Negative Negative, Presumptive Negative     Acceptable Yes        Non-recurrent acute suppurative otitis media of both ears without spontaneous rupture of tympanic membranes  -     Ambulatory referral/consult to ENT  -     amoxicillin-clavulanate 875-125mg (AUGMENTIN) 875-125 mg per tablet; Take 1 tablet by mouth 2 (two) times daily. for 10 days  Dispense: 20 tablet; Refill: 0    Sore throat  -     POCT Influenza A/B MOLECULAR  -     SARS Coronavirus 2 Antigen, POCT Manual Read      Patient Instructions   A referral was placed for you, call [phone] to schedule appointment.    Please drink plenty of fluids.  Please get plenty of rest.  Please return here or go to the Emergency Department for any concerns or worsening of condition.  If you were prescribed antibiotics, please take them to completion.  Please follow up with your primary care doctor or specialist as needed.    If you  smoke, please stop smoking.

## 2025-02-13 NOTE — PATIENT INSTRUCTIONS
A referral was placed for you, call [phone] to schedule appointment.    Please drink plenty of fluids.  Please get plenty of rest.  Please return here or go to the Emergency Department for any concerns or worsening of condition.  If you were prescribed antibiotics, please take them to completion.  Please follow up with your primary care doctor or specialist as needed.    If you  smoke, please stop smoking.

## 2025-02-14 DIAGNOSIS — C90.01 MULTIPLE MYELOMA IN REMISSION: ICD-10-CM

## 2025-02-14 RX ORDER — LENALIDOMIDE 10 MG/1
10 CAPSULE ORAL DAILY
Qty: 21 EACH | Refills: 0 | Status: SHIPPED | OUTPATIENT
Start: 2025-02-14 | End: 2025-03-07

## 2025-02-18 NOTE — PROGRESS NOTES
Subjective:   Vicente Connors is a 64 y.o. male with PMHx of HTN, Multiple myeloma (receiving chemo) who was referred to me by Jo Webb in consultation for  acute otitis media . He reports initially experiencing ear pain in both ears with associated muffled hearing. Notes associated URI with nasal congestion and sore throat. Seen by PCP 2/13/25 who noted FEMI bilaterally. Patient treated with Augmentin x 10 days. He is currently on day 6 of treatment. Notes complete improvement in ear pain but still with muffled hearing. COVID and Flu negative. Denies tinnitus or otorrhea. There is not a prior history of ear surgery. There is not a prior history of ear infections. There is not a history of ear trauma. He denies a history of significant noise exposure.       Past Medical History  He has a past medical history of Cancer, GERD (gastroesophageal reflux disease), and Mild hypertension.    Past Surgical History  He has a past surgical history that includes Cataract extraction, bilateral (Bilateral, 2021) and Appendectomy.    Family History  His family history includes Diabetes in his father; Heart disease in his brother and mother.    Social History  He reports that he has never smoked. He has never been exposed to tobacco smoke. He has never used smokeless tobacco. He reports that he does not currently use alcohol. He reports that he does not use drugs.    Allergies  He has no known allergies.    Medications  He has a current medication list which includes the following prescription(s): acyclovir, amoxicillin-clavulanate 875-125mg, bortezomib, calcium carb/vit d3/minerals, dexamethasone, folbic, hydrochlorothiazide, lenalidomide, magnesium oxide, multivitamin with minerals, omeprazole, rivaroxaban, sulfamethoxazole-trimethoprim 800-160mg, zoledronic acid, and triamcinolone acetonide 0.1%.    Review of Systems     Constitutional: Negative for appetite change, chills, fatigue, fever and unexpected weight loss.       HENT: Positive for ear infection and hearing loss.  Negative for ear discharge, ear pain and ringing in the ears.      Eyes:  Positive for eye itching.     Respiratory:  Positive for cough.      Cardiovascular:  Negative for chest pain, foot swelling, irregular heartbeat and swollen veins.     Gastrointestinal:  Positive for diarrhea.     Genitourinary: Negative for difficulty urinating, sexual problems and frequent urination.     Musc: Positive for back pain.     Skin: Negative for rash.     Allergy: Negative for food allergies and seasonal allergies.     Endocrine: Negative for cold intolerance and heat intolerance.      Neurological: Positive for headaches.     Hematologic: Positive for swollen glands.     Psychiatric: Negative for decreased concentration, depression, nervous/anxious and sleep disturbance.          Objective:       Head:  Normocephalic and atraumatic.     Ears:    Right Ear: No drainage, swelling or tenderness. Tympanic membrane is not injected, not perforated, not erythematous and not retracted.   Left Ear: No drainage, swelling or tenderness. Tympanic membrane is not injected, not perforated, not erythematous and not retracted.   Ears:    Opaque tympanic membranes AU.   No obvious FEMI.     Mouth/Throat  Oropharynx clear and moist without lesions or asymmetry. Tonsils present, +1.      Neck:  No adenopathy.     Procedure  None    Audiology     I independently reviewed the tracings of the complete audiometric evaluation performed today. I reviewed the audiogram with the patient as well. Pertinent findings include: mild rising to normal sloping to moderately severe SNHL AU. Conductive component at 500 Hz AU. Type B tymp AU. SRT 20 dBHL AU. Speech discrimination 96% AD and 100% AS.    Imaging:   No pertinent imaging available.    Assessment:     1. Mixed hearing loss, bilateral    2. Abnormal tympanic membrane of both ears      Plan:   Vicente was seen today for follow-up.  Diagnoses and all  orders for this visit:    Mixed hearing loss, bilateral  Predominately SNHL with a conductive component at 500 Hz in both ears. Type B tympanogram. No mobility on exam. No obvious FEMI. Suspect mild ETD. Recommend daily saline irrigations and OTC Afrin 2 SEN daily for 2 days then stop. Start OTC Flonase 2 SEN daily. Follow up in 6-8 weeks with audiogram.    Abnormal tympanic membrane of both ears  Scant amount of dried blood on both TM. Avoidance of Q tips or anything else in the ear to allow ears to naturally restore their protective wax layer.  Recommend a few drops of mineral oil a few nights per week.

## 2025-02-19 ENCOUNTER — CLINICAL SUPPORT (OUTPATIENT)
Dept: AUDIOLOGY | Facility: CLINIC | Age: 65
End: 2025-02-19
Payer: COMMERCIAL

## 2025-02-19 ENCOUNTER — OFFICE VISIT (OUTPATIENT)
Dept: OTOLARYNGOLOGY | Facility: CLINIC | Age: 65
End: 2025-02-19
Payer: COMMERCIAL

## 2025-02-19 DIAGNOSIS — H73.93 ABNORMAL TYMPANIC MEMBRANE OF BOTH EARS: ICD-10-CM

## 2025-02-19 DIAGNOSIS — H90.A22 SENSORINEURAL HEARING LOSS (SNHL) OF LEFT EAR WITH RESTRICTED HEARING OF RIGHT EAR: Primary | ICD-10-CM

## 2025-02-19 DIAGNOSIS — H90.6 MIXED HEARING LOSS, BILATERAL: Primary | ICD-10-CM

## 2025-02-19 DIAGNOSIS — H69.93 DYSFUNCTION OF BOTH EUSTACHIAN TUBES: ICD-10-CM

## 2025-02-19 PROCEDURE — 99999 PR PBB SHADOW E&M-EST. PATIENT-LVL II: CPT | Mod: PBBFAC,,,

## 2025-02-19 PROCEDURE — 92567 TYMPANOMETRY: CPT | Mod: S$GLB,,,

## 2025-02-19 PROCEDURE — 92557 COMPREHENSIVE HEARING TEST: CPT | Mod: S$GLB,,,

## 2025-02-19 RX ORDER — OFLOXACIN 3 MG/ML
5 SOLUTION AURICULAR (OTIC) DAILY
Qty: 10 ML | Refills: 0 | Status: CANCELLED | OUTPATIENT
Start: 2025-02-19 | End: 2025-02-24

## 2025-02-19 NOTE — PROGRESS NOTES
Vicente Connors, a 64 y.o. male, was seen today in the clinic for an audiologic evaluation. Mr. Connors reported recent ear infections bilaterally. He reported hearing is muffled in both ears. He noted slight otalgia and aural fullness but reported it has improved recently. Patient denied tinnitus and dizziness.    Tympanometry revealed Type B tympanogram with average ear canal volume in the right ear and Type B tympanogram with average ear canal volume in the left ear. Audiogram results revealed mild rising to normal hearing sensitivity sloping to moderately severe mixed hearing loss in the right ear and mild rising to normal hearing sensitivity sloping to moderately severe sensorineural hearing loss with a conductive component at 500 Hz in the left ear.  Speech reception thresholds were noted at 20 dBHL in the right ear and 20 dBHL in the left ear.  Speech discrimination scores were 92% in the right ear and 100% in the left ear.    Recommendations:  Otologic evaluation  Repeat audiogram per ENT plan of care, then annually after that  Hearing protection in noise

## 2025-02-20 ENCOUNTER — PATIENT MESSAGE (OUTPATIENT)
Dept: OTOLARYNGOLOGY | Facility: CLINIC | Age: 65
End: 2025-02-20
Payer: COMMERCIAL

## 2025-02-27 ENCOUNTER — LAB VISIT (OUTPATIENT)
Dept: LAB | Facility: OTHER | Age: 65
End: 2025-02-27
Attending: INTERNAL MEDICINE
Payer: COMMERCIAL

## 2025-02-27 DIAGNOSIS — C90.01 MULTIPLE MYELOMA IN REMISSION: ICD-10-CM

## 2025-02-27 DIAGNOSIS — Z94.84 HISTORY OF AUTO STEM CELL TRANSPLANT: ICD-10-CM

## 2025-02-27 LAB
ALBUMIN SERPL BCP-MCNC: 3.3 G/DL (ref 3.5–5.2)
ALP SERPL-CCNC: 57 U/L (ref 40–150)
ALT SERPL W/O P-5'-P-CCNC: 26 U/L (ref 10–44)
ANION GAP SERPL CALC-SCNC: 9 MMOL/L (ref 8–16)
AST SERPL-CCNC: 23 U/L (ref 10–40)
BASOPHILS # BLD AUTO: 0.01 K/UL (ref 0–0.2)
BASOPHILS NFR BLD: 0.3 % (ref 0–1.9)
BILIRUB SERPL-MCNC: 1 MG/DL (ref 0.1–1)
BUN SERPL-MCNC: 13 MG/DL (ref 8–23)
CALCIUM SERPL-MCNC: 9.2 MG/DL (ref 8.7–10.5)
CHLORIDE SERPL-SCNC: 104 MMOL/L (ref 95–110)
CO2 SERPL-SCNC: 25 MMOL/L (ref 23–29)
CREAT SERPL-MCNC: 0.9 MG/DL (ref 0.5–1.4)
DIFFERENTIAL METHOD BLD: ABNORMAL
EOSINOPHIL # BLD AUTO: 0.1 K/UL (ref 0–0.5)
EOSINOPHIL NFR BLD: 1.5 % (ref 0–8)
ERYTHROCYTE [DISTWIDTH] IN BLOOD BY AUTOMATED COUNT: 16.1 % (ref 11.5–14.5)
EST. GFR  (NO RACE VARIABLE): >60 ML/MIN/1.73 M^2
GLUCOSE SERPL-MCNC: 95 MG/DL (ref 70–110)
HCT VFR BLD AUTO: 36.2 % (ref 40–54)
HGB BLD-MCNC: 12 G/DL (ref 14–18)
IGA SERPL-MCNC: 221 MG/DL (ref 40–350)
IGG SERPL-MCNC: 765 MG/DL (ref 650–1600)
IGM SERPL-MCNC: 16 MG/DL (ref 50–300)
IMM GRANULOCYTES # BLD AUTO: 0.01 K/UL (ref 0–0.04)
IMM GRANULOCYTES NFR BLD AUTO: 0.3 % (ref 0–0.5)
LYMPHOCYTES # BLD AUTO: 1.8 K/UL (ref 1–4.8)
LYMPHOCYTES NFR BLD: 45.3 % (ref 18–48)
MCH RBC QN AUTO: 30.1 PG (ref 27–31)
MCHC RBC AUTO-ENTMCNC: 33.1 G/DL (ref 32–36)
MCV RBC AUTO: 91 FL (ref 82–98)
MONOCYTES # BLD AUTO: 0.5 K/UL (ref 0.3–1)
MONOCYTES NFR BLD: 13.8 % (ref 4–15)
NEUTROPHILS # BLD AUTO: 1.5 K/UL (ref 1.8–7.7)
NEUTROPHILS NFR BLD: 38.8 % (ref 38–73)
NRBC BLD-RTO: 0 /100 WBC
PLATELET # BLD AUTO: 147 K/UL (ref 150–450)
PMV BLD AUTO: 10.4 FL (ref 9.2–12.9)
POTASSIUM SERPL-SCNC: 3.7 MMOL/L (ref 3.5–5.1)
PROT SERPL-MCNC: 6.7 G/DL (ref 6–8.4)
RBC # BLD AUTO: 3.99 M/UL (ref 4.6–6.2)
SODIUM SERPL-SCNC: 138 MMOL/L (ref 136–145)
WBC # BLD AUTO: 3.91 K/UL (ref 3.9–12.7)

## 2025-02-27 PROCEDURE — 86334 IMMUNOFIX E-PHORESIS SERUM: CPT

## 2025-02-27 PROCEDURE — 84165 PROTEIN E-PHORESIS SERUM: CPT

## 2025-02-27 PROCEDURE — 84165 PROTEIN E-PHORESIS SERUM: CPT | Mod: 26,,, | Performed by: PATHOLOGY

## 2025-02-27 PROCEDURE — 82784 ASSAY IGA/IGD/IGG/IGM EACH: CPT | Mod: 59

## 2025-02-27 PROCEDURE — 83521 IG LIGHT CHAINS FREE EACH: CPT | Mod: 59

## 2025-02-27 PROCEDURE — 80053 COMPREHEN METABOLIC PANEL: CPT

## 2025-02-27 PROCEDURE — 85025 COMPLETE CBC W/AUTO DIFF WBC: CPT

## 2025-02-27 PROCEDURE — 86334 IMMUNOFIX E-PHORESIS SERUM: CPT | Mod: 26,,, | Performed by: PATHOLOGY

## 2025-02-27 PROCEDURE — 36415 COLL VENOUS BLD VENIPUNCTURE: CPT

## 2025-02-27 RX ORDER — HEPARIN 100 UNIT/ML
500 SYRINGE INTRAVENOUS
Status: CANCELLED | OUTPATIENT
Start: 2025-02-28

## 2025-02-27 RX ORDER — SODIUM CHLORIDE 0.9 % (FLUSH) 0.9 %
10 SYRINGE (ML) INJECTION
Status: CANCELLED | OUTPATIENT
Start: 2025-02-28

## 2025-02-27 RX ORDER — BORTEZOMIB 3.5 MG/1
1.3 INJECTION, POWDER, LYOPHILIZED, FOR SOLUTION INTRAVENOUS; SUBCUTANEOUS
Status: CANCELLED | OUTPATIENT
Start: 2025-02-28

## 2025-02-28 ENCOUNTER — INFUSION (OUTPATIENT)
Dept: INFUSION THERAPY | Facility: OTHER | Age: 65
End: 2025-02-28
Attending: INTERNAL MEDICINE
Payer: COMMERCIAL

## 2025-02-28 VITALS
BODY MASS INDEX: 27.76 KG/M2 | DIASTOLIC BLOOD PRESSURE: 73 MMHG | WEIGHT: 182.56 LBS | RESPIRATION RATE: 16 BRPM | TEMPERATURE: 98 F | HEART RATE: 88 BPM | SYSTOLIC BLOOD PRESSURE: 158 MMHG | OXYGEN SATURATION: 97 %

## 2025-02-28 DIAGNOSIS — C90.01 MULTIPLE MYELOMA IN REMISSION: Primary | ICD-10-CM

## 2025-02-28 LAB
ALBUMIN SERPL ELPH-MCNC: 3.43 G/DL (ref 3.35–5.55)
ALPHA1 GLOB SERPL ELPH-MCNC: 0.28 G/DL (ref 0.17–0.41)
ALPHA2 GLOB SERPL ELPH-MCNC: 0.63 G/DL (ref 0.43–0.99)
B-GLOBULIN SERPL ELPH-MCNC: 0.83 G/DL (ref 0.5–1.1)
GAMMA GLOB SERPL ELPH-MCNC: 0.73 G/DL (ref 0.67–1.58)
INTERPRETATION SERPL IFE-IMP: NORMAL
KAPPA LC SER QL IA: 2.31 MG/DL (ref 0.33–1.94)
KAPPA LC/LAMBDA SER IA: 1.72 (ref 0.26–1.65)
LAMBDA LC SER QL IA: 1.34 MG/DL (ref 0.57–2.63)
PROT SERPL-MCNC: 5.9 G/DL (ref 6–8.4)

## 2025-02-28 PROCEDURE — 63600175 PHARM REV CODE 636 W HCPCS

## 2025-02-28 PROCEDURE — 96401 CHEMO ANTI-NEOPL SQ/IM: CPT

## 2025-02-28 RX ORDER — HEPARIN 100 UNIT/ML
500 SYRINGE INTRAVENOUS
Status: DISCONTINUED | OUTPATIENT
Start: 2025-02-28 | End: 2025-02-28 | Stop reason: HOSPADM

## 2025-02-28 RX ORDER — BORTEZOMIB 3.5 MG/1
1.3 INJECTION, POWDER, LYOPHILIZED, FOR SOLUTION INTRAVENOUS; SUBCUTANEOUS
Status: COMPLETED | OUTPATIENT
Start: 2025-02-28 | End: 2025-02-28

## 2025-02-28 RX ORDER — SODIUM CHLORIDE 0.9 % (FLUSH) 0.9 %
10 SYRINGE (ML) INJECTION
Status: DISCONTINUED | OUTPATIENT
Start: 2025-02-28 | End: 2025-02-28 | Stop reason: HOSPADM

## 2025-02-28 RX ADMIN — BORTEZOMIB 2.5 MG: 3.5 INJECTION, POWDER, LYOPHILIZED, FOR SOLUTION INTRAVENOUS; SUBCUTANEOUS at 09:02

## 2025-03-03 ENCOUNTER — RESULTS FOLLOW-UP (OUTPATIENT)
Dept: HEMATOLOGY/ONCOLOGY | Facility: CLINIC | Age: 65
End: 2025-03-03

## 2025-03-03 LAB
PATHOLOGIST INTERPRETATION IFE: NORMAL
PATHOLOGIST INTERPRETATION SPE: NORMAL

## 2025-03-17 DIAGNOSIS — C90.01 MULTIPLE MYELOMA IN REMISSION: ICD-10-CM

## 2025-03-18 RX ORDER — LENALIDOMIDE 10 MG/1
10 CAPSULE ORAL DAILY
Qty: 21 EACH | Refills: 0 | Status: SHIPPED | OUTPATIENT
Start: 2025-03-18 | End: 2025-04-08

## 2025-03-27 ENCOUNTER — LAB VISIT (OUTPATIENT)
Dept: LAB | Facility: OTHER | Age: 65
End: 2025-03-27
Attending: INTERNAL MEDICINE
Payer: COMMERCIAL

## 2025-03-27 DIAGNOSIS — C90.01 MULTIPLE MYELOMA IN REMISSION: ICD-10-CM

## 2025-03-27 DIAGNOSIS — Z94.84 HISTORY OF AUTO STEM CELL TRANSPLANT: ICD-10-CM

## 2025-03-27 LAB
ABSOLUTE EOSINOPHIL (OHS): 0.16 K/UL
ABSOLUTE MONOCYTE (OHS): 0.5 K/UL (ref 0.3–1)
ABSOLUTE NEUTROPHIL COUNT (OHS): 1.81 K/UL (ref 1.8–7.7)
ALBUMIN SERPL BCP-MCNC: 3.3 G/DL (ref 3.5–5.2)
ALP SERPL-CCNC: 60 UNIT/L (ref 40–150)
ALT SERPL W/O P-5'-P-CCNC: 29 UNIT/L (ref 10–44)
ANION GAP (OHS): 9 MMOL/L (ref 8–16)
AST SERPL-CCNC: 28 UNIT/L (ref 11–45)
BASOPHILS # BLD AUTO: 0.02 K/UL
BASOPHILS NFR BLD AUTO: 0.5 %
BILIRUB SERPL-MCNC: 1.5 MG/DL (ref 0.1–1)
BUN SERPL-MCNC: 11 MG/DL (ref 8–23)
CALCIUM SERPL-MCNC: 9.3 MG/DL (ref 8.7–10.5)
CHLORIDE SERPL-SCNC: 105 MMOL/L (ref 95–110)
CO2 SERPL-SCNC: 24 MMOL/L (ref 23–29)
CREAT SERPL-MCNC: 1 MG/DL (ref 0.5–1.4)
ERYTHROCYTE [DISTWIDTH] IN BLOOD BY AUTOMATED COUNT: 16.5 % (ref 11.5–14.5)
GFR SERPLBLD CREATININE-BSD FMLA CKD-EPI: >60 ML/MIN/1.73/M2
GLUCOSE SERPL-MCNC: 130 MG/DL (ref 70–110)
HCT VFR BLD AUTO: 38.9 % (ref 40–54)
HGB BLD-MCNC: 12.8 GM/DL (ref 14–18)
IMM GRANULOCYTES # BLD AUTO: 0.01 K/UL (ref 0–0.04)
IMM GRANULOCYTES NFR BLD AUTO: 0.3 % (ref 0–0.5)
LYMPHOCYTES # BLD AUTO: 1.25 K/UL (ref 1–4.8)
MCH RBC QN AUTO: 30.1 PG (ref 27–50)
MCHC RBC AUTO-ENTMCNC: 32.9 G/DL (ref 32–36)
MCV RBC AUTO: 92 FL (ref 82–98)
NUCLEATED RBC (/100WBC) (OHS): 0 /100 WBC
PLATELET # BLD AUTO: 155 K/UL (ref 150–450)
PMV BLD AUTO: 9.8 FL (ref 9.2–12.9)
POTASSIUM SERPL-SCNC: 3.3 MMOL/L (ref 3.5–5.1)
PROT SERPL-MCNC: 6.7 GM/DL (ref 6–8.4)
RBC # BLD AUTO: 4.25 M/UL (ref 4.6–6.2)
RELATIVE EOSINOPHIL (OHS): 4.3 %
RELATIVE LYMPHOCYTE (OHS): 33.3 % (ref 18–48)
RELATIVE MONOCYTE (OHS): 13.3 % (ref 4–15)
RELATIVE NEUTROPHIL (OHS): 48.3 % (ref 38–73)
SODIUM SERPL-SCNC: 138 MMOL/L (ref 136–145)
WBC # BLD AUTO: 3.75 K/UL (ref 3.9–12.7)

## 2025-03-27 PROCEDURE — 84165 PROTEIN E-PHORESIS SERUM: CPT | Mod: ,,, | Performed by: PATHOLOGY

## 2025-03-27 PROCEDURE — 85025 COMPLETE CBC W/AUTO DIFF WBC: CPT

## 2025-03-27 PROCEDURE — 84165 PROTEIN E-PHORESIS SERUM: CPT

## 2025-03-27 PROCEDURE — 82784 ASSAY IGA/IGD/IGG/IGM EACH: CPT

## 2025-03-27 PROCEDURE — 86334 IMMUNOFIX E-PHORESIS SERUM: CPT

## 2025-03-27 PROCEDURE — 84295 ASSAY OF SERUM SODIUM: CPT

## 2025-03-27 PROCEDURE — 83521 IG LIGHT CHAINS FREE EACH: CPT

## 2025-03-27 PROCEDURE — 36415 COLL VENOUS BLD VENIPUNCTURE: CPT

## 2025-03-27 RX ORDER — SODIUM CHLORIDE 0.9 % (FLUSH) 0.9 %
10 SYRINGE (ML) INJECTION
Status: CANCELLED | OUTPATIENT
Start: 2025-03-28

## 2025-03-27 RX ORDER — BORTEZOMIB 3.5 MG/1
1.3 INJECTION, POWDER, LYOPHILIZED, FOR SOLUTION INTRAVENOUS; SUBCUTANEOUS
Status: CANCELLED | OUTPATIENT
Start: 2025-03-28

## 2025-03-27 RX ORDER — HEPARIN 100 UNIT/ML
500 SYRINGE INTRAVENOUS
Status: CANCELLED | OUTPATIENT
Start: 2025-03-28

## 2025-03-27 NOTE — PROGRESS NOTES
Section of Hematology and Stem Cell Transplantation  Follow-Up Note     04/02/2025  Primary Oncologic Diagnosis: Multiple myeloma in remission [C90.01]    History of Present Ilness:   Vicente Steele) is a pleasant 64 y.o.male from [place], [place], here for follow up of IgA kappa multiple myeloma, s/p tandem autoSCT, on VRd maintenance. Past medical history of peripheral neuropathy, history of LE DVT on chronic anticoagulation, GERD, vertebral fracture. Previously followed by Dr. Burt and Dr. Coronel.    Oncologic History:  Here for hematology follow up. He has recently moved to [place] from [place]. He has history of stage IIIA, [employer] stage II IgA kappa multiple myeloma, diagnosed in 2014, which is likely intermediate-risk based upon the presence of t(4;14) with hyperdiploidy, based on records available through care everywhere.   He was started on velcade, revlimid and dexamethasone at diagnosis.  Of note, he developed a popliteal DVT and was started on Lovenox and later switched Xarelto.   He completed a stem cell transplant with Dr Schuster in May 2015. He is s/p tandem autologous transplant in 2015 and has been on triple maintenance with bortezomib, lenalidomide and dexamethasone since then.  He has had multiple skeletal complications since his original diagnosis.  He had prolonged bout of diarrhea in January 2023. All stool studies were negative/ unremarkable. He had COVID 19 infection in March 2023. He received paxlovid.  MRI spine in March 2023 showed compression fracture of L1 with retropulsion of the posterior fragment as well as minimal compression fractures of the superior endplates of T8 and T11. MRI pelvis with facet arthropathy in the lower lumbar spine. Hip joints exhibiting bilateral chondral thinning with labral fraying and osteophyte production.     Interval History 04/02/2025:  Patient here for follow up, on VRd with monthly velcade. He reports everything is status quo  with no bothersome symptoms. He has some recent colds with no fevers but feeling improved today. His chronic back pain is stable. Denies fever, chills, drenching night sweats, unexplained weight loss, early satiety, chest pain, shortness of breath, new/worsening bone pain, adenopathy, N/V/D.     Past Medical History, Social History, and Past Family History are unchanged since last evaluation except for HPI.    Past Medical History:   Diagnosis Date    Cancer     GERD (gastroesophageal reflux disease)     Mild hypertension 3/15/2021        CURRENT MEDICATIONS:   Current Outpatient Medications   Medication Sig    acyclovir (ZOVIRAX) 400 MG tablet Take 1 tablet (400 mg total) by mouth 2 (two) times daily.    bortezomib (VELCADE INJ) Inject as directed. Pt gets every month    calcium carb/vit D3/minerals (CALCIUM-VITAMIN D ORAL)     dexAMETHasone (DECADRON) 4 MG Tab TAKE 10 TABLETS (40 MG DOSE) BY MOUTH DAY OF AND DAY AFTER VELCADE once monthly    folic acid-vit B6-vit B12 2.5-25-2 mg (FOLBIC) 2.5-25-2 mg Tab Take 1 tablet by mouth once daily.    hydroCHLOROthiazide (HYDRODIURIL) 25 MG tablet TAKE 1 TABLET(25 MG) BY MOUTH EVERY DAY    lenalidomide 10 mg Cap Take 1 capsule (10 mg total) by mouth once daily On Days 1-21 of a 28 Day Cycle. Auth #[id number] 2/14/25 BRAND NAME ONLY.    magnesium oxide 500 mg Tab once daily.    multivitamin with minerals (MULTI-VITAMIN W/MINERALS ORAL)     omeprazole (PRILOSEC) 20 MG capsule     rivaroxaban (XARELTO) 15 mg Tab Take 1 tablet (15 mg total) by mouth once daily.    sulfamethoxazole-trimethoprim 800-160mg (BACTRIM DS) 800-160 mg Tab TAKE 1 TABLET EVERY MONDAY, WEDNESDAY AND FRIDAY    zoledronic acid (ZOMETA IV) Inject into the vein. Pt gets every 3 months     No current facility-administered medications for this visit.       ALLERGIES:   Review of patient's allergies indicates:  No Known Allergies    Review of Systems:     Review of Systems   Constitutional:   Negative for chills, fever, malaise/fatigue and weight loss.   HENT:  Positive for congestion. Negative for nosebleeds, sinus pain and sore throat.    Eyes:  Negative for blurred vision, double vision, photophobia and pain.   Respiratory:  Negative for cough and shortness of breath.    Cardiovascular:  Negative for chest pain and palpitations.   Gastrointestinal:  Negative for constipation, diarrhea, heartburn, nausea and vomiting.   Genitourinary:  Negative for dysuria and hematuria.   Musculoskeletal:  Positive for back pain. Negative for falls.   Skin:  Negative for rash.   Neurological:  Positive for sensory change. Negative for dizziness, weakness and headaches.   Endo/Heme/Allergies:  Negative for environmental allergies.   Psychiatric/Behavioral:  The patient does not have insomnia.        Physical Exam:     Vitals:    04/02/25 0759   BP: (!) 142/71   Pulse: 78   Temp: 98 °F (36.7 °C)       Physical Exam  Vitals reviewed.   Constitutional:       General: He is not in acute distress.     Appearance: Normal appearance. He is normal weight. He is not ill-appearing.   HENT:      Head: Normocephalic and atraumatic.      Nose: Nose normal. No congestion.      Comments: Post nasal drip     Mouth/Throat:      Mouth: Mucous membranes are moist.      Pharynx: Oropharynx is clear. No oropharyngeal exudate.   Eyes:      Pupils: Pupils are equal, round, and reactive to light.   Cardiovascular:      Rate and Rhythm: Normal rate and regular rhythm.      Pulses: Normal pulses.      Heart sounds: Normal heart sounds. No murmur heard.  Pulmonary:      Effort: Pulmonary effort is normal.      Breath sounds: Normal breath sounds. No wheezing.   Abdominal:      General: Bowel sounds are normal.      Palpations: Abdomen is soft.      Tenderness: There is no abdominal tenderness.   Musculoskeletal:         General: Normal range of motion.      Cervical back: Normal range of motion and neck supple.      Right lower leg: No edema.       Left lower leg: No edema.   Skin:     General: Skin is warm and dry.      Findings: No bruising, lesion or rash.   Neurological:      Mental Status: He is alert and oriented to person, place, and time.      Motor: No weakness.   Psychiatric:         Mood and Affect: Mood normal.         Thought Content: Thought content normal.        ECOG Performance Status: (foot note - ECOG PS provided by Eastern Cooperative Oncology Group) 1 - Symptomatic but completely ambulatory    Karnofsky Performance Score:  90%- Able to Carry on Normal Activity: Minor Symptoms of Disease      Labs:   Lab Results   Component Value Date    WBC 3.75 (L) 03/27/2025    HGB 12.8 (L) 03/27/2025    HCT 38.9 (L) 03/27/2025    MCV 92 03/27/2025     03/27/2025       Lab Results   Component Value Date     03/27/2025    K 3.3 (L) 03/27/2025     03/27/2025    CO2 24 03/27/2025    BUN 11 03/27/2025    CREATININE 1.0 03/27/2025    ALBUMIN 3.3 (L) 03/27/2025    BILITOT 1.5 (H) 03/27/2025    ALKPHOS 60 03/27/2025    AST 28 03/27/2025    ALT 29 03/27/2025       Imaging:   None     Pathology:  None       Assessment and Plan:     Problem List Items Addressed This Visit      Multiple myeloma in remission  - Stage IIIA, ISS II IgA kappa multiple myeloma, which is likely intermediate-risk based upon the presence of t(4;14) with hyperdiploidy   - Now on triple maintenance: monthly velcade 1.3 mg/m2 sub q, dex 40 mg PO day of and day after monthly velcade, revlimid 10 mg PO for 21 days on / 7 days off every 28 days  - Systemic restaging completed to reassess MRD status for possible therapy deescalation in light of almost 10 years since SCT  - 11/27/24 marrow shows 10e-5 MRD negative sCR; he expressed hesitancy over stopping maintenance  - 3/27/25 biochemical studies/labs cw with CR and no CRAB  - Dr. Coronel reached out to Dr. Ramirez at [facility], she suggested de-escalating to lenalidomide alone for a year, repeating BMBx, and if continued MRD  negativity, stopping therapy, then continuing annual BMBx with MRD testing  - Plan to continue maintenance as above for now, patient would like to discuss with MD prior to de-escalation.  - Continue zometa q3 months, previously for jaw pain, but cleared by dentist after extensive evaluation including MRI. Next, July 2025.    History of auto stem cell transplant  - S/p tandem autoSCT in 2015    Other thrombophilia  - History of DVT, increased risk of clots with ongoing revlimid  - Continue xarelto 15 mg daily to prevent VTE    Immunodeficiency due to drugs  - Secondary to maintenance chemotherapy, continue acyclovir two times daily for shingles prophylaxis  - Post-transplant vaccines complete    Cancer associated pain  - History of vertebral compression fracture  - Chronic, stable, unchanged, not on analgesic at this time    Anemia in neoplastic disease  - Secondary to chemotherapy, mild, stable, asymptomatic    Leukopenia due to antineoplastic chemotherapy  - Secondary to chemotherapy  - Stable, recent cold, no fever, ANC normal    Healthcare maintenance  - Colonoscopy 2011 and 2017, repeat 2022   - PSA  up to date   - Covid vaccine 3/31 pfizer booster done, second booster 2/17/22  - Recommend COVID booster      BMT Chart Routing      Follow up with physician 2 months. Dr. Aden or Ramandeep, discuss de-escalation of treatment   Follow up with [facility]    Provider visit type    Infusion scheduling note   Zometa 7/18   Injection scheduling note [facility]: Velcade 5/23, 6/20, 7/18   Labs CBC, CMP, free light chains, immunofixation, immunoglobulins and SPEP   Scheduling:  Preferred lab:  Lab interval:  [facility]: 4/24, 5/22, 6/19 (day prior to treatments)   Imaging    Pharmacy appointment    Other referrals                Orders Placed:      Orders Placed This Encounter    CBC Auto Differential    Comprehensive Metabolic Panel    Immunofixation, Serum    Immunoglobulin Free LT Chains Blood    Immunoglobulins (IgG, IgA, IgM)  Quantitative    Protein Electrophoresis, Serum       Total time of this visit was 30 minutes, including time spent face to face with patient and/or via video/audio, and also in preparing for today's visit for MDM and documentation. (Medical Decision Making, including consideration of possible diagnoses, management options, complex medical record review, review of diagnostic tests and information, consideration and discussion of significant complications based on comorbidities, and discussion with providers involved with the care of the patient). Greater than 50% was spent face to face with the patient counseling and coordinating care.    Visit today included increased complexity associated with the care of the episodic problem therapy de-escalation discussion addressed and managing the longitudinal care of the patient due to the serious and/or complex managed problem(s) multiple myeloma, s/p autologous stem cell transplant.     Thank you for allowing me to partake in the care of this patient. If there are any questions, please do not hesitate to reach out.    Mary Jane Shirley, FNP-C  Hematologic Malignancies, Stem Cell Transplantation, and Cellular Therapy  [facility]

## 2025-03-28 ENCOUNTER — INFUSION (OUTPATIENT)
Dept: INFUSION THERAPY | Facility: OTHER | Age: 65
End: 2025-03-28
Attending: INTERNAL MEDICINE
Payer: COMMERCIAL

## 2025-03-28 VITALS
SYSTOLIC BLOOD PRESSURE: 145 MMHG | HEIGHT: 68 IN | HEART RATE: 85 BPM | DIASTOLIC BLOOD PRESSURE: 67 MMHG | OXYGEN SATURATION: 97 % | TEMPERATURE: 98 F | RESPIRATION RATE: 16 BRPM | BODY MASS INDEX: 27.17 KG/M2 | WEIGHT: 179.25 LBS

## 2025-03-28 DIAGNOSIS — C90.01 MULTIPLE MYELOMA IN REMISSION: Primary | ICD-10-CM

## 2025-03-28 LAB
ALBUMIN, SPE (OHS): 3.52 G/DL (ref 3.35–5.55)
ALPHA 1 GLOB (OHS): 0.33 GM/DL (ref 0.17–0.41)
ALPHA 2 GLOB (OHS): 0.7 GM/DL (ref 0.43–0.99)
BETA GLOB (OHS): 0.82 GM/DL (ref 0.5–1.1)
GAMMA GLOBULIN (OHS): 0.63 GM/DL (ref 0.67–1.58)
IGA SERPL-MCNC: 175 MG/DL (ref 40–350)
IGG SERPL-MCNC: 671 MG/DL (ref 650–1600)
IGM SERPL-MCNC: 16 MG/DL (ref 50–300)
KAPPA LC FREE SER-MCNC: 1.73 MG/L (ref 0.26–1.65)
KAPPA LC FREE/LAMBDA FREE SER: 2.22 MG/DL (ref 0.33–1.94)
LAMBDA LC FREE SERPL-MCNC: 1.28 MG/DL (ref 0.57–2.63)
PROT SERPL-MCNC: 6 GM/DL (ref 6–8.4)

## 2025-03-28 PROCEDURE — 63600175 PHARM REV CODE 636 W HCPCS

## 2025-03-28 PROCEDURE — 96401 CHEMO ANTI-NEOPL SQ/IM: CPT

## 2025-03-28 RX ORDER — HEPARIN 100 UNIT/ML
500 SYRINGE INTRAVENOUS
Status: DISCONTINUED | OUTPATIENT
Start: 2025-03-28 | End: 2025-03-28 | Stop reason: HOSPADM

## 2025-03-28 RX ORDER — SODIUM CHLORIDE 0.9 % (FLUSH) 0.9 %
10 SYRINGE (ML) INJECTION
Status: DISCONTINUED | OUTPATIENT
Start: 2025-03-28 | End: 2025-03-28 | Stop reason: HOSPADM

## 2025-03-28 RX ORDER — BORTEZOMIB 3.5 MG/1
1.3 INJECTION, POWDER, LYOPHILIZED, FOR SOLUTION INTRAVENOUS; SUBCUTANEOUS
Status: COMPLETED | OUTPATIENT
Start: 2025-03-28 | End: 2025-03-28

## 2025-03-28 RX ADMIN — BORTEZOMIB 2.5 MG: 3.5 INJECTION, POWDER, LYOPHILIZED, FOR SOLUTION INTRAVENOUS; SUBCUTANEOUS at 09:03

## 2025-03-28 NOTE — PLAN OF CARE
Velcade injection complete. Pt tolerated well. VSS. NAD. Pt states he takes home steroid. Next injection appointment confirmed. Pt verbalized understanding of discharge instructions before leaving.

## 2025-03-29 ENCOUNTER — PATIENT MESSAGE (OUTPATIENT)
Dept: HEMATOLOGY/ONCOLOGY | Facility: CLINIC | Age: 65
End: 2025-03-29
Payer: COMMERCIAL

## 2025-03-29 LAB
PATHOLOGIST INTERPRETATION - IFE SERUM (OHS): NORMAL
PATHOLOGIST REVIEW - SPE (OHS): NORMAL

## 2025-03-31 DIAGNOSIS — Z94.81 AUTOLOGOUS BONE MARROW TRANSPLANTATION STATUS: ICD-10-CM

## 2025-03-31 DIAGNOSIS — C90.01 MULTIPLE MYELOMA IN REMISSION: ICD-10-CM

## 2025-03-31 RX ORDER — ACYCLOVIR 400 MG/1
400 TABLET ORAL 2 TIMES DAILY
Qty: 180 TABLET | Refills: 4 | Status: SHIPPED | OUTPATIENT
Start: 2025-03-31

## 2025-04-02 ENCOUNTER — OFFICE VISIT (OUTPATIENT)
Dept: HEMATOLOGY/ONCOLOGY | Facility: CLINIC | Age: 65
End: 2025-04-02
Payer: COMMERCIAL

## 2025-04-02 ENCOUNTER — CLINICAL SUPPORT (OUTPATIENT)
Dept: AUDIOLOGY | Facility: CLINIC | Age: 65
End: 2025-04-02
Payer: COMMERCIAL

## 2025-04-02 ENCOUNTER — OFFICE VISIT (OUTPATIENT)
Dept: OTOLARYNGOLOGY | Facility: CLINIC | Age: 65
End: 2025-04-02
Payer: COMMERCIAL

## 2025-04-02 VITALS
DIASTOLIC BLOOD PRESSURE: 71 MMHG | SYSTOLIC BLOOD PRESSURE: 142 MMHG | HEART RATE: 78 BPM | TEMPERATURE: 98 F | BODY MASS INDEX: 27.03 KG/M2 | WEIGHT: 178.38 LBS | HEIGHT: 68 IN | OXYGEN SATURATION: 97 %

## 2025-04-02 DIAGNOSIS — Z79.899 IMMUNODEFICIENCY DUE TO DRUGS: ICD-10-CM

## 2025-04-02 DIAGNOSIS — D70.1 LEUKOPENIA DUE TO ANTINEOPLASTIC CHEMOTHERAPY: ICD-10-CM

## 2025-04-02 DIAGNOSIS — G89.3 CANCER ASSOCIATED PAIN: ICD-10-CM

## 2025-04-02 DIAGNOSIS — H90.3 SENSORINEURAL HEARING LOSS (SNHL) OF BOTH EARS: Primary | ICD-10-CM

## 2025-04-02 DIAGNOSIS — D63.0 ANEMIA IN NEOPLASTIC DISEASE: ICD-10-CM

## 2025-04-02 DIAGNOSIS — C90.01 MULTIPLE MYELOMA IN REMISSION: Primary | ICD-10-CM

## 2025-04-02 DIAGNOSIS — D84.821 IMMUNODEFICIENCY DUE TO DRUGS: ICD-10-CM

## 2025-04-02 DIAGNOSIS — H90.3 HEARING LOSS, SENSORINEURAL, HIGH FREQUENCY, BILATERAL: Primary | ICD-10-CM

## 2025-04-02 DIAGNOSIS — Z94.84 HISTORY OF AUTO STEM CELL TRANSPLANT: ICD-10-CM

## 2025-04-02 DIAGNOSIS — D68.69 OTHER THROMBOPHILIA: ICD-10-CM

## 2025-04-02 DIAGNOSIS — Z00.00 HEALTHCARE MAINTENANCE: ICD-10-CM

## 2025-04-02 DIAGNOSIS — H90.6 MIXED HEARING LOSS, BILATERAL: ICD-10-CM

## 2025-04-02 DIAGNOSIS — T45.1X5A LEUKOPENIA DUE TO ANTINEOPLASTIC CHEMOTHERAPY: ICD-10-CM

## 2025-04-02 PROBLEM — D61.818 PANCYTOPENIA: Status: RESOLVED | Noted: 2022-12-05 | Resolved: 2025-04-02

## 2025-04-02 PROCEDURE — 99212 OFFICE O/P EST SF 10 MIN: CPT | Mod: S$GLB,,,

## 2025-04-02 PROCEDURE — 1101F PT FALLS ASSESS-DOCD LE1/YR: CPT | Mod: CPTII,S$GLB,,

## 2025-04-02 PROCEDURE — 99999 PR PBB SHADOW E&M-EST. PATIENT-LVL IV: CPT | Mod: PBBFAC,,,

## 2025-04-02 PROCEDURE — 3288F FALL RISK ASSESSMENT DOCD: CPT | Mod: CPTII,S$GLB,,

## 2025-04-02 PROCEDURE — 99999 PR PBB SHADOW E&M-EST. PATIENT-LVL III: CPT | Mod: PBBFAC,,,

## 2025-04-02 PROCEDURE — 1159F MED LIST DOCD IN RCRD: CPT | Mod: CPTII,S$GLB,,

## 2025-04-02 PROCEDURE — 1126F AMNT PAIN NOTED NONE PRSNT: CPT | Mod: CPTII,S$GLB,,

## 2025-04-02 PROCEDURE — 99999 PR PBB SHADOW E&M-EST. PATIENT-LVL I: CPT | Mod: PBBFAC,,, | Performed by: AUDIOLOGIST

## 2025-04-02 RX ORDER — SODIUM CHLORIDE 0.9 % (FLUSH) 0.9 %
10 SYRINGE (ML) INJECTION
OUTPATIENT
Start: 2025-04-25

## 2025-04-02 RX ORDER — BORTEZOMIB 3.5 MG/1
1.3 INJECTION, POWDER, LYOPHILIZED, FOR SOLUTION INTRAVENOUS; SUBCUTANEOUS
OUTPATIENT
Start: 2025-04-25

## 2025-04-02 RX ORDER — HEPARIN 100 UNIT/ML
500 SYRINGE INTRAVENOUS
OUTPATIENT
Start: 2025-04-25

## 2025-04-02 NOTE — PROGRESS NOTES
Vicente Connors, a 64 y.o. male, was seen today in the clinic for a follow-up audiologic evaluation.  Patient has a history of bilateral middle ear effusion. Patient reported improvement in sound perception. Patient denied tinnitus and vertigo.    Tympanometry revealed Type C in the right ear and Type A in the left ear.     Audiogram results revealed normal to moderately-severe sensorineural hearing loss bilaterally.  Speech reception thresholds were noted at 25 dB in the right ear and 30 dB in the left ear.  Speech discrimination scores were 100% in the right ear and 100% in the left ear.    Recommendations:  Otologic evaluation  Annual audiogram  Hearing protection when in noise

## 2025-04-02 NOTE — PROGRESS NOTES
Subjective:   Previous note from 2/19/25:  Vicente Connors is a 64 y.o. male with PMHx of HTN, Multiple myeloma (receiving chemo) who was referred to me by Jo Webb in consultation for  acute otitis media . He reports initially experiencing ear pain in both ears with associated muffled hearing. Notes associated URI with nasal congestion and sore throat. Seen by PCP 2/13/25 who noted FEMI bilaterally. Patient treated with Augmentin x 10 days. He is currently on day 6 of treatment. Notes complete improvement in ear pain but still with muffled hearing. COVID and Flu negative. Denies tinnitus or otorrhea. There is not a prior history of ear surgery. There is not a prior history of ear infections. There is not a history of ear trauma. He denies a history of significant noise exposure.     4/2/25:   Patient presents today for 6 week follow up. He had a mixed hearing loss bilaterally and a type B tympanogram at the previous visit. No obvious FEMI noted on exam or concern of infection. He completed Augmentin. He used Afrin for 2 days. He did not try Flonase. Overall he has had complete improvement in ear related symptoms. Has no hearing concerns, otalgia, otorrhea or tinnitus at this time.       Past Medical History  He has a past medical history of Cancer, GERD (gastroesophageal reflux disease), and Mild hypertension.    Past Surgical History  He has a past surgical history that includes Cataract extraction, bilateral (Bilateral, 2021) and Appendectomy.    Family History  His family history includes Diabetes in his father; Heart disease in his brother and mother.    Social History  He reports that he has never smoked. He has never been exposed to tobacco smoke. He has never used smokeless tobacco. He reports that he does not currently use alcohol. He reports that he does not use drugs.    Allergies  He has no known allergies.    Medications  He has a current medication list which includes the following  prescription(s): acyclovir, bortezomib, calcium carb/vit d3/minerals, dexamethasone, folbic, hydrochlorothiazide, lenalidomide, magnesium oxide, multivitamin with minerals, omeprazole, rivaroxaban, sulfamethoxazole-trimethoprim 800-160mg, and zoledronic acid.    Review of Systems     Constitutional: Negative for appetite change, chills, fatigue, fever and unexpected weight loss.      HENT: Positive for ear infection and hearing loss.  Negative for ear discharge, ear pain and ringing in the ears.      Eyes:  Positive for eye itching.     Respiratory:  Positive for cough.      Cardiovascular:  Negative for chest pain, foot swelling, irregular heartbeat and swollen veins.     Gastrointestinal:  Positive for diarrhea.     Genitourinary: Negative for difficulty urinating, sexual problems and frequent urination.     Musc: Positive for back pain.     Skin: Negative for rash.     Allergy: Negative for food allergies and seasonal allergies.     Endocrine: Negative for cold intolerance and heat intolerance.      Neurological: Positive for headaches.     Hematologic: Positive for swollen glands.     Psychiatric: Negative for decreased concentration, depression, nervous/anxious and sleep disturbance.          Objective:       Head:  Normocephalic and atraumatic.     Ears:    Right Ear: No drainage, swelling or tenderness. Tympanic membrane is not injected, not scarred, not perforated, not erythematous and not retracted.   Left Ear: No drainage, swelling or tenderness. Tympanic membrane is not injected, not scarred, not perforated, not erythematous and not retracted.     Mouth/Throat  Oropharynx clear and moist without lesions or asymmetry. Tonsils present, +1.      Neck:  No adenopathy.     Procedure  None    Audiology     I independently reviewed the tracings of the complete audiometric evaluation performed today. I reviewed the audiogram with the patient as well. Pertinent findings include: mild rising to normal sloping to  moderately severe SNHL AU. Conductive component at 500 Hz AU. Type B tymp AU. SRT 20 dBHL AU. Speech discrimination 96% AD and 100% AS.      Audiogram today with essentially normal hearing with sloping to moderately severe SNHL at 6k-8k Hz AU. SRT 25 dBHL AD and 30 dBHL AS. Speech discrimination 100% AU. Type C tympanogram AD and type A tympanogram AS.      Imaging:   No pertinent imaging available.    Assessment:     1. Hearing loss, sensorineural, high frequency, bilateral    2. Mixed hearing loss, bilateral        Plan:   Vicente was seen today for follow-up.  Diagnoses and all orders for this visit:    Hearing loss, sensorineural, high frequency, bilateral  Audiogram interpretation with moderately severe high frequency SNHL in both ears. Audiometric testing interpretation consistent with sensorineural hearing loss. Discussed the etiology of SNHL. Medically cleared for hearing amplification, and will follow-up with Audiology if interested. Hearing conservation in noisy environments. RTC in 2-3 years for routine audiogram or sooner if needed.     Mixed hearing loss, bilateral  Resolved. Follow up as needed.

## 2025-04-09 DIAGNOSIS — C90.01 MULTIPLE MYELOMA IN REMISSION: ICD-10-CM

## 2025-04-09 RX ORDER — LENALIDOMIDE 10 MG/1
10 CAPSULE ORAL DAILY
Qty: 21 EACH | Refills: 0 | Status: SHIPPED | OUTPATIENT
Start: 2025-04-09 | End: 2025-04-30

## 2025-04-18 ENCOUNTER — PATIENT MESSAGE (OUTPATIENT)
Dept: HEMATOLOGY/ONCOLOGY | Facility: CLINIC | Age: 65
End: 2025-04-18
Payer: COMMERCIAL

## 2025-04-20 ENCOUNTER — PATIENT MESSAGE (OUTPATIENT)
Dept: INTERNAL MEDICINE | Facility: CLINIC | Age: 65
End: 2025-04-20
Payer: COMMERCIAL

## 2025-04-20 ENCOUNTER — PATIENT MESSAGE (OUTPATIENT)
Dept: HEMATOLOGY/ONCOLOGY | Facility: CLINIC | Age: 65
End: 2025-04-20
Payer: COMMERCIAL

## 2025-04-24 ENCOUNTER — LAB VISIT (OUTPATIENT)
Dept: LAB | Facility: OTHER | Age: 65
End: 2025-04-24
Attending: INTERNAL MEDICINE
Payer: COMMERCIAL

## 2025-04-24 DIAGNOSIS — Z94.84 HISTORY OF AUTO STEM CELL TRANSPLANT: ICD-10-CM

## 2025-04-24 DIAGNOSIS — C90.01 MULTIPLE MYELOMA IN REMISSION: ICD-10-CM

## 2025-04-24 LAB
ABSOLUTE EOSINOPHIL (OHS): 0.09 K/UL
ABSOLUTE MONOCYTE (OHS): 0.32 K/UL (ref 0.3–1)
ABSOLUTE NEUTROPHIL COUNT (OHS): 1.24 K/UL (ref 1.8–7.7)
ALBUMIN SERPL BCP-MCNC: 3.3 G/DL (ref 3.5–5.2)
ALP SERPL-CCNC: 57 UNIT/L (ref 40–150)
ALT SERPL W/O P-5'-P-CCNC: 26 UNIT/L (ref 10–44)
ANION GAP (OHS): 8 MMOL/L (ref 8–16)
AST SERPL-CCNC: 25 UNIT/L (ref 11–45)
BASOPHILS # BLD AUTO: 0.02 K/UL
BASOPHILS NFR BLD AUTO: 0.7 %
BILIRUB SERPL-MCNC: 1.4 MG/DL (ref 0.1–1)
BUN SERPL-MCNC: 11 MG/DL (ref 8–23)
CALCIUM SERPL-MCNC: 8.9 MG/DL (ref 8.7–10.5)
CHLORIDE SERPL-SCNC: 105 MMOL/L (ref 95–110)
CO2 SERPL-SCNC: 26 MMOL/L (ref 23–29)
CREAT SERPL-MCNC: 0.9 MG/DL (ref 0.5–1.4)
ERYTHROCYTE [DISTWIDTH] IN BLOOD BY AUTOMATED COUNT: 16.2 % (ref 11.5–14.5)
GFR SERPLBLD CREATININE-BSD FMLA CKD-EPI: >60 ML/MIN/1.73/M2
GLUCOSE SERPL-MCNC: 146 MG/DL (ref 70–110)
HCT VFR BLD AUTO: 37.3 % (ref 40–54)
HGB BLD-MCNC: 12.1 GM/DL (ref 14–18)
IGA SERPL-MCNC: 201 MG/DL (ref 40–350)
IGG SERPL-MCNC: 730 MG/DL (ref 650–1600)
IGM SERPL-MCNC: 17 MG/DL (ref 50–300)
IMM GRANULOCYTES # BLD AUTO: 0.01 K/UL (ref 0–0.04)
IMM GRANULOCYTES NFR BLD AUTO: 0.3 % (ref 0–0.5)
LYMPHOCYTES # BLD AUTO: 1.27 K/UL (ref 1–4.8)
MCH RBC QN AUTO: 29.7 PG (ref 27–31)
MCHC RBC AUTO-ENTMCNC: 32.4 G/DL (ref 32–36)
MCV RBC AUTO: 92 FL (ref 82–98)
NUCLEATED RBC (/100WBC) (OHS): 0 /100 WBC
PLATELET # BLD AUTO: 133 K/UL (ref 150–450)
PMV BLD AUTO: 9.5 FL (ref 9.2–12.9)
POTASSIUM SERPL-SCNC: 3.3 MMOL/L (ref 3.5–5.1)
PROT SERPL-MCNC: 6.3 GM/DL (ref 6–8.4)
RBC # BLD AUTO: 4.07 M/UL (ref 4.6–6.2)
RELATIVE EOSINOPHIL (OHS): 3.1 %
RELATIVE LYMPHOCYTE (OHS): 43.1 % (ref 18–48)
RELATIVE MONOCYTE (OHS): 10.8 % (ref 4–15)
RELATIVE NEUTROPHIL (OHS): 42 % (ref 38–73)
SODIUM SERPL-SCNC: 139 MMOL/L (ref 136–145)
WBC # BLD AUTO: 2.95 K/UL (ref 3.9–12.7)

## 2025-04-24 PROCEDURE — 82784 ASSAY IGA/IGD/IGG/IGM EACH: CPT | Mod: 59

## 2025-04-24 PROCEDURE — 84165 PROTEIN E-PHORESIS SERUM: CPT

## 2025-04-24 PROCEDURE — 85025 COMPLETE CBC W/AUTO DIFF WBC: CPT

## 2025-04-24 PROCEDURE — 82040 ASSAY OF SERUM ALBUMIN: CPT

## 2025-04-24 PROCEDURE — 84165 PROTEIN E-PHORESIS SERUM: CPT | Mod: 26,,, | Performed by: PATHOLOGY

## 2025-04-24 PROCEDURE — 36415 COLL VENOUS BLD VENIPUNCTURE: CPT

## 2025-04-24 PROCEDURE — 83521 IG LIGHT CHAINS FREE EACH: CPT

## 2025-04-24 PROCEDURE — 86334 IMMUNOFIX E-PHORESIS SERUM: CPT

## 2025-04-25 ENCOUNTER — INFUSION (OUTPATIENT)
Dept: INFUSION THERAPY | Facility: OTHER | Age: 65
End: 2025-04-25
Attending: INTERNAL MEDICINE
Payer: COMMERCIAL

## 2025-04-25 VITALS
WEIGHT: 181.69 LBS | TEMPERATURE: 98 F | DIASTOLIC BLOOD PRESSURE: 74 MMHG | OXYGEN SATURATION: 96 % | HEART RATE: 79 BPM | RESPIRATION RATE: 16 BRPM | SYSTOLIC BLOOD PRESSURE: 139 MMHG | BODY MASS INDEX: 27.62 KG/M2

## 2025-04-25 DIAGNOSIS — C90.01 MULTIPLE MYELOMA IN REMISSION: Primary | ICD-10-CM

## 2025-04-25 LAB
ALBUMIN, SPE (OHS): 3.51 G/DL (ref 3.35–5.55)
ALPHA 1 GLOB (OHS): 0.25 GM/DL (ref 0.17–0.41)
ALPHA 2 GLOB (OHS): 0.53 GM/DL (ref 0.43–0.99)
BETA GLOB (OHS): 0.81 GM/DL (ref 0.5–1.1)
GAMMA GLOBULIN (OHS): 0.7 GM/DL (ref 0.67–1.58)
KAPPA LC FREE SER-MCNC: 1.32 MG/L (ref 0.26–1.65)
KAPPA LC FREE/LAMBDA FREE SER: 1.75 MG/DL (ref 0.33–1.94)
LAMBDA LC FREE SERPL-MCNC: 1.33 MG/DL (ref 0.57–2.63)
PROT SERPL-MCNC: 5.8 GM/DL (ref 6–8.4)

## 2025-04-25 PROCEDURE — 96374 THER/PROPH/DIAG INJ IV PUSH: CPT

## 2025-04-25 PROCEDURE — 63600175 PHARM REV CODE 636 W HCPCS

## 2025-04-25 PROCEDURE — 96401 CHEMO ANTI-NEOPL SQ/IM: CPT

## 2025-04-25 RX ORDER — HEPARIN 100 UNIT/ML
500 SYRINGE INTRAVENOUS
Status: DISCONTINUED | OUTPATIENT
Start: 2025-04-25 | End: 2025-04-25 | Stop reason: HOSPADM

## 2025-04-25 RX ORDER — SODIUM CHLORIDE 0.9 % (FLUSH) 0.9 %
10 SYRINGE (ML) INJECTION
Status: DISCONTINUED | OUTPATIENT
Start: 2025-04-25 | End: 2025-04-25 | Stop reason: HOSPADM

## 2025-04-25 RX ORDER — HEPARIN 100 UNIT/ML
500 SYRINGE INTRAVENOUS
Status: DISCONTINUED | OUTPATIENT
Start: 2025-04-25 | End: 2025-04-25

## 2025-04-25 RX ORDER — ZOLEDRONIC ACID 0.04 MG/ML
4 INJECTION, SOLUTION INTRAVENOUS
Status: COMPLETED | OUTPATIENT
Start: 2025-04-25 | End: 2025-04-25

## 2025-04-25 RX ORDER — BORTEZOMIB 3.5 MG/1
1.3 INJECTION, POWDER, LYOPHILIZED, FOR SOLUTION INTRAVENOUS; SUBCUTANEOUS
Status: COMPLETED | OUTPATIENT
Start: 2025-04-25 | End: 2025-04-25

## 2025-04-25 RX ADMIN — BORTEZOMIB 2.5 MG: 3.5 INJECTION, POWDER, LYOPHILIZED, FOR SOLUTION INTRAVENOUS; SUBCUTANEOUS at 09:04

## 2025-04-25 RX ADMIN — ZOLEDRONIC ACID 4 MG: 0.04 INJECTION, SOLUTION INTRAVENOUS at 09:04

## 2025-04-25 NOTE — PLAN OF CARE
Problem: Adult Inpatient Plan of Care  Goal: Plan of Care Review  Outcome: Progressing  Goal: Patient-Specific Goal (Individualized)  Outcome: Progressing     Problem: Nausea and Vomiting (Chemotherapy Effects)  Goal: Fluid and Electrolyte Balance  Outcome: Progressing       Patient presented today for zometa infusion and velcade injection. PIV started and infusion given with no issues and injection given in abd tissue. VS remained stable throughout visit and assessment provided no issues. PIV removed w/o complications and all questions answered. Patient scheduled for next appt and left clinic in no acute distress.

## 2025-04-29 ENCOUNTER — RESULTS FOLLOW-UP (OUTPATIENT)
Dept: HEMATOLOGY/ONCOLOGY | Facility: CLINIC | Age: 65
End: 2025-04-29

## 2025-04-29 ENCOUNTER — PATIENT MESSAGE (OUTPATIENT)
Dept: HEMATOLOGY/ONCOLOGY | Facility: CLINIC | Age: 65
End: 2025-04-29
Payer: COMMERCIAL

## 2025-04-29 DIAGNOSIS — Z86.718 HISTORY OF DVT (DEEP VEIN THROMBOSIS): ICD-10-CM

## 2025-04-29 DIAGNOSIS — C90.01 MULTIPLE MYELOMA IN REMISSION: ICD-10-CM

## 2025-04-29 LAB
PATHOLOGIST INTERPRETATION - IFE SERUM (OHS): NORMAL
PATHOLOGIST REVIEW - SPE (OHS): NORMAL

## 2025-05-01 DIAGNOSIS — C90.01 MULTIPLE MYELOMA IN REMISSION: ICD-10-CM

## 2025-05-01 RX ORDER — LENALIDOMIDE 10 MG/1
10 CAPSULE ORAL DAILY
Qty: 21 EACH | Refills: 0 | Status: SHIPPED | OUTPATIENT
Start: 2025-05-01 | End: 2025-05-22

## 2025-05-14 ENCOUNTER — PATIENT MESSAGE (OUTPATIENT)
Dept: HEMATOLOGY/ONCOLOGY | Facility: CLINIC | Age: 65
End: 2025-05-14
Payer: COMMERCIAL

## 2025-05-28 ENCOUNTER — LAB VISIT (OUTPATIENT)
Dept: LAB | Facility: OTHER | Age: 65
End: 2025-05-28
Attending: INTERNAL MEDICINE
Payer: COMMERCIAL

## 2025-05-28 DIAGNOSIS — C90.01 MULTIPLE MYELOMA IN REMISSION: ICD-10-CM

## 2025-05-28 DIAGNOSIS — Z94.84 HISTORY OF AUTO STEM CELL TRANSPLANT: ICD-10-CM

## 2025-05-28 LAB
ABSOLUTE EOSINOPHIL (OHS): 0.07 K/UL
ABSOLUTE MONOCYTE (OHS): 0.48 K/UL (ref 0.3–1)
ABSOLUTE NEUTROPHIL COUNT (OHS): 1.83 K/UL (ref 1.8–7.7)
ALBUMIN SERPL BCP-MCNC: 3.1 G/DL (ref 3.5–5.2)
ALP SERPL-CCNC: 57 UNIT/L (ref 40–150)
ALT SERPL W/O P-5'-P-CCNC: 29 UNIT/L (ref 10–44)
ANION GAP (OHS): 12 MMOL/L (ref 8–16)
AST SERPL-CCNC: 32 UNIT/L (ref 11–45)
BASOPHILS # BLD AUTO: 0.02 K/UL
BASOPHILS NFR BLD AUTO: 0.5 %
BILIRUB SERPL-MCNC: 0.9 MG/DL (ref 0.1–1)
BUN SERPL-MCNC: 18 MG/DL (ref 8–23)
CALCIUM SERPL-MCNC: 9.3 MG/DL (ref 8.7–10.5)
CHLORIDE SERPL-SCNC: 99 MMOL/L (ref 95–110)
CO2 SERPL-SCNC: 27 MMOL/L (ref 23–29)
CREAT SERPL-MCNC: 1 MG/DL (ref 0.5–1.4)
ERYTHROCYTE [DISTWIDTH] IN BLOOD BY AUTOMATED COUNT: 16.1 % (ref 11.5–14.5)
GFR SERPLBLD CREATININE-BSD FMLA CKD-EPI: >60 ML/MIN/1.73/M2
GLUCOSE SERPL-MCNC: 110 MG/DL (ref 70–110)
HCT VFR BLD AUTO: 38.8 % (ref 40–54)
HGB BLD-MCNC: 12.6 GM/DL (ref 14–18)
IGA SERPL-MCNC: 252 MG/DL (ref 40–350)
IGG SERPL-MCNC: 941 MG/DL (ref 650–1600)
IGM SERPL-MCNC: 18 MG/DL (ref 50–300)
IMM GRANULOCYTES # BLD AUTO: 0 K/UL (ref 0–0.04)
IMM GRANULOCYTES NFR BLD AUTO: 0 % (ref 0–0.5)
LYMPHOCYTES # BLD AUTO: 1.31 K/UL (ref 1–4.8)
MCH RBC QN AUTO: 29.9 PG (ref 27–31)
MCHC RBC AUTO-ENTMCNC: 32.5 G/DL (ref 32–36)
MCV RBC AUTO: 92 FL (ref 82–98)
NUCLEATED RBC (/100WBC) (OHS): 0 /100 WBC
PLATELET # BLD AUTO: 162 K/UL (ref 150–450)
PMV BLD AUTO: 9.9 FL (ref 9.2–12.9)
POTASSIUM SERPL-SCNC: 3.4 MMOL/L (ref 3.5–5.1)
PROT SERPL-MCNC: 7.4 GM/DL (ref 6–8.4)
RBC # BLD AUTO: 4.22 M/UL (ref 4.6–6.2)
RELATIVE EOSINOPHIL (OHS): 1.9 %
RELATIVE LYMPHOCYTE (OHS): 35.3 % (ref 18–48)
RELATIVE MONOCYTE (OHS): 12.9 % (ref 4–15)
RELATIVE NEUTROPHIL (OHS): 49.4 % (ref 38–73)
SODIUM SERPL-SCNC: 138 MMOL/L (ref 136–145)
WBC # BLD AUTO: 3.71 K/UL (ref 3.9–12.7)

## 2025-05-28 PROCEDURE — 85025 COMPLETE CBC W/AUTO DIFF WBC: CPT

## 2025-05-28 PROCEDURE — 36415 COLL VENOUS BLD VENIPUNCTURE: CPT

## 2025-05-28 PROCEDURE — 80053 COMPREHEN METABOLIC PANEL: CPT

## 2025-05-28 PROCEDURE — 83521 IG LIGHT CHAINS FREE EACH: CPT

## 2025-05-28 PROCEDURE — 82784 ASSAY IGA/IGD/IGG/IGM EACH: CPT | Mod: 59

## 2025-05-28 PROCEDURE — 84165 PROTEIN E-PHORESIS SERUM: CPT

## 2025-05-28 PROCEDURE — 86334 IMMUNOFIX E-PHORESIS SERUM: CPT

## 2025-05-28 RX ORDER — SODIUM CHLORIDE 0.9 % (FLUSH) 0.9 %
10 SYRINGE (ML) INJECTION
Status: CANCELLED | OUTPATIENT
Start: 2025-05-30

## 2025-05-28 RX ORDER — BORTEZOMIB 3.5 MG/1
1.3 INJECTION, POWDER, LYOPHILIZED, FOR SOLUTION INTRAVENOUS; SUBCUTANEOUS
Status: CANCELLED | OUTPATIENT
Start: 2025-05-30

## 2025-05-28 RX ORDER — HEPARIN 100 UNIT/ML
500 SYRINGE INTRAVENOUS
Status: CANCELLED | OUTPATIENT
Start: 2025-05-30

## 2025-05-29 LAB
ALBUMIN, SPE (OHS): 3.23 G/DL (ref 3.35–5.55)
ALPHA 1 GLOB (OHS): 0.43 GM/DL (ref 0.17–0.41)
ALPHA 2 GLOB (OHS): 0.86 GM/DL (ref 0.43–0.99)
BETA GLOB (OHS): 0.89 GM/DL (ref 0.5–1.1)
GAMMA GLOBULIN (OHS): 0.89 GM/DL (ref 0.67–1.58)
KAPPA LC FREE SER-MCNC: 1.46 MG/L (ref 0.26–1.65)
KAPPA LC FREE/LAMBDA FREE SER: 2.93 MG/DL (ref 0.33–1.94)
LAMBDA LC FREE SERPL-MCNC: 2.01 MG/DL (ref 0.57–2.63)
PATHOLOGIST INTERPRETATION - IFE SERUM (OHS): NORMAL
PATHOLOGIST REVIEW - SPE (OHS): NORMAL
PROT SERPL-MCNC: 6.3 GM/DL (ref 6–8.4)

## 2025-05-30 ENCOUNTER — INFUSION (OUTPATIENT)
Dept: INFUSION THERAPY | Facility: OTHER | Age: 65
End: 2025-05-30
Attending: INTERNAL MEDICINE
Payer: COMMERCIAL

## 2025-05-30 VITALS
HEART RATE: 82 BPM | OXYGEN SATURATION: 97 % | DIASTOLIC BLOOD PRESSURE: 76 MMHG | WEIGHT: 178.38 LBS | TEMPERATURE: 98 F | BODY MASS INDEX: 27.12 KG/M2 | SYSTOLIC BLOOD PRESSURE: 132 MMHG | RESPIRATION RATE: 16 BRPM

## 2025-05-30 DIAGNOSIS — C90.01 MULTIPLE MYELOMA IN REMISSION: Primary | ICD-10-CM

## 2025-05-30 PROCEDURE — 96401 CHEMO ANTI-NEOPL SQ/IM: CPT

## 2025-05-30 PROCEDURE — 63600175 PHARM REV CODE 636 W HCPCS

## 2025-05-30 RX ORDER — HEPARIN 100 UNIT/ML
500 SYRINGE INTRAVENOUS
Status: DISCONTINUED | OUTPATIENT
Start: 2025-05-30 | End: 2025-05-30 | Stop reason: HOSPADM

## 2025-05-30 RX ORDER — SODIUM CHLORIDE 0.9 % (FLUSH) 0.9 %
10 SYRINGE (ML) INJECTION
Status: DISCONTINUED | OUTPATIENT
Start: 2025-05-30 | End: 2025-05-30 | Stop reason: HOSPADM

## 2025-05-30 RX ORDER — BORTEZOMIB 3.5 MG/1
1.3 INJECTION, POWDER, LYOPHILIZED, FOR SOLUTION INTRAVENOUS; SUBCUTANEOUS
Status: COMPLETED | OUTPATIENT
Start: 2025-05-30 | End: 2025-05-30

## 2025-05-30 RX ADMIN — BORTEZOMIB 2.5 MG: 3.5 INJECTION, POWDER, LYOPHILIZED, FOR SOLUTION INTRAVENOUS; SUBCUTANEOUS at 09:05

## 2025-05-30 NOTE — PLAN OF CARE
Problem: Adult Inpatient Plan of Care  Goal: Plan of Care Review  Outcome: Progressing  Goal: Patient-Specific Goal (Individualized)  Outcome: Progressing     Problem: Chemotherapy Effects  Goal: Safety Maintained  Outcome: Progressing  Goal: Absence of Infection  Outcome: Progressing       Patient arrived to clinic today for SQ velcade injection. VS and assessment completed with no acute issues noted. Injection given and band aid applied. All questions answered, scheduled for next appt and left clinic in NAD.

## 2025-06-02 ENCOUNTER — RESULTS FOLLOW-UP (OUTPATIENT)
Dept: HEMATOLOGY/ONCOLOGY | Facility: CLINIC | Age: 65
End: 2025-06-02

## 2025-06-03 ENCOUNTER — OFFICE VISIT (OUTPATIENT)
Dept: HEMATOLOGY/ONCOLOGY | Facility: CLINIC | Age: 65
End: 2025-06-03
Payer: COMMERCIAL

## 2025-06-03 VITALS
RESPIRATION RATE: 16 BRPM | HEART RATE: 88 BPM | HEIGHT: 68 IN | SYSTOLIC BLOOD PRESSURE: 135 MMHG | OXYGEN SATURATION: 97 % | BODY MASS INDEX: 27.36 KG/M2 | DIASTOLIC BLOOD PRESSURE: 75 MMHG | WEIGHT: 180.56 LBS

## 2025-06-03 DIAGNOSIS — D84.821 IMMUNODEFICIENCY DUE TO DRUGS: ICD-10-CM

## 2025-06-03 DIAGNOSIS — Z86.718 HISTORY OF DVT (DEEP VEIN THROMBOSIS): ICD-10-CM

## 2025-06-03 DIAGNOSIS — Z79.899 IMMUNODEFICIENCY DUE TO DRUGS: ICD-10-CM

## 2025-06-03 DIAGNOSIS — Z94.84 HISTORY OF AUTO STEM CELL TRANSPLANT: ICD-10-CM

## 2025-06-03 DIAGNOSIS — C90.01 MULTIPLE MYELOMA IN REMISSION: Primary | ICD-10-CM

## 2025-06-03 PROCEDURE — 1126F AMNT PAIN NOTED NONE PRSNT: CPT | Mod: CPTII,S$GLB,, | Performed by: INTERNAL MEDICINE

## 2025-06-03 PROCEDURE — 3075F SYST BP GE 130 - 139MM HG: CPT | Mod: CPTII,S$GLB,, | Performed by: INTERNAL MEDICINE

## 2025-06-03 PROCEDURE — 1159F MED LIST DOCD IN RCRD: CPT | Mod: CPTII,S$GLB,, | Performed by: INTERNAL MEDICINE

## 2025-06-03 PROCEDURE — 3288F FALL RISK ASSESSMENT DOCD: CPT | Mod: CPTII,S$GLB,, | Performed by: INTERNAL MEDICINE

## 2025-06-03 PROCEDURE — 3078F DIAST BP <80 MM HG: CPT | Mod: CPTII,S$GLB,, | Performed by: INTERNAL MEDICINE

## 2025-06-03 PROCEDURE — 99999 PR PBB SHADOW E&M-EST. PATIENT-LVL IV: CPT | Mod: PBBFAC,,, | Performed by: INTERNAL MEDICINE

## 2025-06-03 PROCEDURE — 3008F BODY MASS INDEX DOCD: CPT | Mod: CPTII,S$GLB,, | Performed by: INTERNAL MEDICINE

## 2025-06-03 PROCEDURE — 1101F PT FALLS ASSESS-DOCD LE1/YR: CPT | Mod: CPTII,S$GLB,, | Performed by: INTERNAL MEDICINE

## 2025-06-03 PROCEDURE — 99215 OFFICE O/P EST HI 40 MIN: CPT | Mod: S$GLB,,, | Performed by: INTERNAL MEDICINE

## 2025-06-03 RX ORDER — LENALIDOMIDE 10 MG/1
10 CAPSULE ORAL
COMMUNITY
Start: 2025-05-09 | End: 2025-06-04 | Stop reason: SDUPTHER

## 2025-06-04 DIAGNOSIS — C90.01 MULTIPLE MYELOMA IN REMISSION: Primary | ICD-10-CM

## 2025-06-04 RX ORDER — LENALIDOMIDE 10 MG/1
10 CAPSULE ORAL DAILY
Qty: 21 EACH | Refills: 0 | Status: SHIPPED | OUTPATIENT
Start: 2025-06-04

## 2025-06-13 ENCOUNTER — HOSPITAL ENCOUNTER (OUTPATIENT)
Dept: RADIOLOGY | Facility: OTHER | Age: 65
Discharge: HOME | End: 2025-06-13
Attending: INTERNAL MEDICINE
Payer: COMMERCIAL

## 2025-06-13 DIAGNOSIS — C90.01 MULTIPLE MYELOMA IN REMISSION: ICD-10-CM

## 2025-06-13 PROCEDURE — 72197 MRI PELVIS W/O & W/DYE: CPT | Mod: 26,,, | Performed by: RADIOLOGY

## 2025-06-13 PROCEDURE — 72156 MRI NECK SPINE W/O & W/DYE: CPT | Mod: TC

## 2025-06-13 PROCEDURE — 72157 MRI CHEST SPINE W/O & W/DYE: CPT | Mod: 26,,, | Performed by: RADIOLOGY

## 2025-06-13 PROCEDURE — 72158 MRI LUMBAR SPINE W/O & W/DYE: CPT | Mod: 26,,, | Performed by: RADIOLOGY

## 2025-06-13 PROCEDURE — A9585 GADOBUTROL INJECTION: HCPCS | Performed by: INTERNAL MEDICINE

## 2025-06-13 PROCEDURE — 72197 MRI PELVIS W/O & W/DYE: CPT | Mod: TC

## 2025-06-13 PROCEDURE — 25500020 PHARM REV CODE 255: Performed by: INTERNAL MEDICINE

## 2025-06-13 PROCEDURE — 72156 MRI NECK SPINE W/O & W/DYE: CPT | Mod: 26,,, | Performed by: RADIOLOGY

## 2025-06-13 RX ORDER — GADOBUTROL 604.72 MG/ML
8 INJECTION INTRAVENOUS
Status: COMPLETED | OUTPATIENT
Start: 2025-06-13 | End: 2025-06-13

## 2025-06-13 RX ADMIN — GADOBUTROL 8 ML: 604.72 INJECTION INTRAVENOUS at 02:06

## 2025-06-16 ENCOUNTER — PATIENT MESSAGE (OUTPATIENT)
Dept: HEMATOLOGY/ONCOLOGY | Facility: CLINIC | Age: 65
End: 2025-06-16
Payer: COMMERCIAL

## 2025-06-16 DIAGNOSIS — C90.01 MULTIPLE MYELOMA IN REMISSION: Primary | ICD-10-CM

## 2025-06-26 ENCOUNTER — LAB VISIT (OUTPATIENT)
Dept: LAB | Facility: OTHER | Age: 65
End: 2025-06-26
Attending: INTERNAL MEDICINE
Payer: COMMERCIAL

## 2025-06-26 DIAGNOSIS — C90.01 MULTIPLE MYELOMA IN REMISSION: ICD-10-CM

## 2025-06-26 LAB
ABSOLUTE EOSINOPHIL (OHS): 0.1 K/UL
ABSOLUTE MONOCYTE (OHS): 0.37 K/UL (ref 0.3–1)
ABSOLUTE NEUTROPHIL COUNT (OHS): 1.79 K/UL (ref 1.8–7.7)
ALBUMIN SERPL BCP-MCNC: 2.9 G/DL (ref 3.5–5.2)
ALP SERPL-CCNC: 63 UNIT/L (ref 40–150)
ALT SERPL W/O P-5'-P-CCNC: 22 UNIT/L (ref 10–44)
ANION GAP (OHS): 13 MMOL/L (ref 8–16)
AST SERPL-CCNC: 21 UNIT/L (ref 11–45)
BASOPHILS # BLD AUTO: 0.05 K/UL
BASOPHILS NFR BLD AUTO: 1.4 %
BETA 2 MICROGLOBILIN (OHS): 2.5 UG/ML (ref 0–2.5)
BILIRUB SERPL-MCNC: 0.8 MG/DL (ref 0.1–1)
BUN SERPL-MCNC: 16 MG/DL (ref 8–23)
CALCIUM SERPL-MCNC: 8.9 MG/DL (ref 8.7–10.5)
CHLORIDE SERPL-SCNC: 105 MMOL/L (ref 95–110)
CO2 SERPL-SCNC: 24 MMOL/L (ref 23–29)
CREAT SERPL-MCNC: 1 MG/DL (ref 0.5–1.4)
CRP SERPL-MCNC: 6.5 MG/L
ERYTHROCYTE [DISTWIDTH] IN BLOOD BY AUTOMATED COUNT: 16.5 % (ref 11.5–14.5)
GFR SERPLBLD CREATININE-BSD FMLA CKD-EPI: >60 ML/MIN/1.73/M2
GLUCOSE SERPL-MCNC: 159 MG/DL (ref 70–110)
HCT VFR BLD AUTO: 36.6 % (ref 40–54)
HGB BLD-MCNC: 12.1 GM/DL (ref 14–18)
IGA SERPL-MCNC: 285 MG/DL (ref 40–350)
IGG SERPL-MCNC: 794 MG/DL (ref 650–1600)
IGM SERPL-MCNC: 21 MG/DL (ref 50–300)
IMM GRANULOCYTES # BLD AUTO: 0.02 K/UL (ref 0–0.04)
IMM GRANULOCYTES NFR BLD AUTO: 0.6 % (ref 0–0.5)
LDH SERPL-CCNC: 183 U/L (ref 110–260)
LYMPHOCYTES # BLD AUTO: 1.29 K/UL (ref 1–4.8)
MCH RBC QN AUTO: 29.8 PG (ref 27–31)
MCHC RBC AUTO-ENTMCNC: 33.1 G/DL (ref 32–36)
MCV RBC AUTO: 90 FL (ref 82–98)
NUCLEATED RBC (/100WBC) (OHS): 0 /100 WBC
PLATELET # BLD AUTO: 194 K/UL (ref 150–450)
PMV BLD AUTO: 10 FL (ref 9.2–12.9)
POTASSIUM SERPL-SCNC: 3.4 MMOL/L (ref 3.5–5.1)
PROT SERPL-MCNC: 6.5 GM/DL (ref 6–8.4)
RBC # BLD AUTO: 4.06 M/UL (ref 4.6–6.2)
RELATIVE EOSINOPHIL (OHS): 2.8 %
RELATIVE LYMPHOCYTE (OHS): 35.6 % (ref 18–48)
RELATIVE MONOCYTE (OHS): 10.2 % (ref 4–15)
RELATIVE NEUTROPHIL (OHS): 49.4 % (ref 38–73)
SODIUM SERPL-SCNC: 142 MMOL/L (ref 136–145)
URATE SERPL-MCNC: 5.9 MG/DL (ref 3.4–7)
WBC # BLD AUTO: 3.62 K/UL (ref 3.9–12.7)

## 2025-06-26 PROCEDURE — 84075 ASSAY ALKALINE PHOSPHATASE: CPT

## 2025-06-26 PROCEDURE — 84165 PROTEIN E-PHORESIS SERUM: CPT | Mod: ,,, | Performed by: PATHOLOGY

## 2025-06-26 PROCEDURE — 84550 ASSAY OF BLOOD/URIC ACID: CPT

## 2025-06-26 PROCEDURE — 83615 LACTATE (LD) (LDH) ENZYME: CPT

## 2025-06-26 PROCEDURE — 85025 COMPLETE CBC W/AUTO DIFF WBC: CPT

## 2025-06-26 PROCEDURE — 82784 ASSAY IGA/IGD/IGG/IGM EACH: CPT | Mod: 59

## 2025-06-26 PROCEDURE — 84165 PROTEIN E-PHORESIS SERUM: CPT

## 2025-06-26 PROCEDURE — 82232 ASSAY OF BETA-2 PROTEIN: CPT

## 2025-06-26 PROCEDURE — 86140 C-REACTIVE PROTEIN: CPT

## 2025-06-26 PROCEDURE — 83521 IG LIGHT CHAINS FREE EACH: CPT

## 2025-06-26 PROCEDURE — 36415 COLL VENOUS BLD VENIPUNCTURE: CPT

## 2025-06-26 RX ORDER — SODIUM CHLORIDE 0.9 % (FLUSH) 0.9 %
10 SYRINGE (ML) INJECTION
Status: CANCELLED | OUTPATIENT
Start: 2025-06-27

## 2025-06-26 RX ORDER — HEPARIN 100 UNIT/ML
500 SYRINGE INTRAVENOUS
Status: CANCELLED | OUTPATIENT
Start: 2025-06-27

## 2025-06-26 RX ORDER — BORTEZOMIB 3.5 MG/1
1.3 INJECTION, POWDER, LYOPHILIZED, FOR SOLUTION INTRAVENOUS; SUBCUTANEOUS
Status: CANCELLED | OUTPATIENT
Start: 2025-06-27

## 2025-06-27 ENCOUNTER — INFUSION (OUTPATIENT)
Dept: INFUSION THERAPY | Facility: OTHER | Age: 65
End: 2025-06-27
Attending: INTERNAL MEDICINE
Payer: COMMERCIAL

## 2025-06-27 VITALS — BODY MASS INDEX: 27.05 KG/M2 | WEIGHT: 177.94 LBS

## 2025-06-27 DIAGNOSIS — C90.01 MULTIPLE MYELOMA IN REMISSION: Primary | ICD-10-CM

## 2025-06-27 LAB
ALBUMIN, SPE (OHS): 3.09 G/DL (ref 3.35–5.55)
ALPHA 1 GLOB (OHS): 0.34 GM/DL (ref 0.17–0.41)
ALPHA 2 GLOB (OHS): 0.74 GM/DL (ref 0.43–0.99)
BETA GLOB (OHS): 0.85 GM/DL (ref 0.5–1.1)
GAMMA GLOBULIN (OHS): 0.79 GM/DL (ref 0.67–1.58)
KAPPA LC FREE SER-MCNC: 1.3 MG/L (ref 0.26–1.65)
KAPPA LC FREE/LAMBDA FREE SER: 2.59 MG/DL (ref 0.33–1.94)
LAMBDA LC FREE SERPL-MCNC: 2 MG/DL (ref 0.57–2.63)
PATHOLOGIST REVIEW - SPE (OHS): NORMAL
PROT SERPL-MCNC: 5.8 GM/DL (ref 6–8.4)

## 2025-06-27 PROCEDURE — 63600175 PHARM REV CODE 636 W HCPCS

## 2025-06-27 PROCEDURE — 96401 CHEMO ANTI-NEOPL SQ/IM: CPT

## 2025-06-27 RX ORDER — SODIUM CHLORIDE 0.9 % (FLUSH) 0.9 %
10 SYRINGE (ML) INJECTION
Status: DISCONTINUED | OUTPATIENT
Start: 2025-06-27 | End: 2025-06-27 | Stop reason: HOSPADM

## 2025-06-27 RX ORDER — BORTEZOMIB 3.5 MG/1
1.3 INJECTION, POWDER, LYOPHILIZED, FOR SOLUTION INTRAVENOUS; SUBCUTANEOUS
Status: COMPLETED | OUTPATIENT
Start: 2025-06-27 | End: 2025-06-27

## 2025-06-27 RX ORDER — HEPARIN 100 UNIT/ML
500 SYRINGE INTRAVENOUS
Status: DISCONTINUED | OUTPATIENT
Start: 2025-06-27 | End: 2025-06-27 | Stop reason: HOSPADM

## 2025-06-27 RX ADMIN — BORTEZOMIB 2.5 MG: 3.5 INJECTION, POWDER, LYOPHILIZED, FOR SOLUTION INTRAVENOUS; SUBCUTANEOUS at 09:06

## 2025-06-28 ENCOUNTER — PATIENT MESSAGE (OUTPATIENT)
Dept: HEMATOLOGY/ONCOLOGY | Facility: CLINIC | Age: 65
End: 2025-06-28
Payer: COMMERCIAL

## 2025-06-28 DIAGNOSIS — C90.01 MULTIPLE MYELOMA IN REMISSION: ICD-10-CM

## 2025-06-28 DIAGNOSIS — Z94.81 AUTOLOGOUS BONE MARROW TRANSPLANTATION STATUS: ICD-10-CM

## 2025-07-01 DIAGNOSIS — C90.01 MULTIPLE MYELOMA IN REMISSION: ICD-10-CM

## 2025-07-01 DIAGNOSIS — Z94.81 AUTOLOGOUS BONE MARROW TRANSPLANTATION STATUS: ICD-10-CM

## 2025-07-01 RX ORDER — FOLIC ACID-PYRIDOXINE-CYANOCOBALAMIN TAB 2.5-25-2 MG 2.5-25-2 MG
1 TAB ORAL DAILY
Qty: 30 TABLET | Refills: 11 | Status: SHIPPED | OUTPATIENT
Start: 2025-07-01

## 2025-07-02 DIAGNOSIS — C90.01 MULTIPLE MYELOMA IN REMISSION: ICD-10-CM

## 2025-07-02 RX ORDER — LENALIDOMIDE 10 MG/1
10 CAPSULE ORAL DAILY
Qty: 21 EACH | Refills: 0 | Status: SHIPPED | OUTPATIENT
Start: 2025-07-02

## 2025-07-14 ENCOUNTER — TELEPHONE (OUTPATIENT)
Dept: HEMATOLOGY/ONCOLOGY | Facility: CLINIC | Age: 65
End: 2025-07-14
Payer: COMMERCIAL

## 2025-07-15 ENCOUNTER — OFFICE VISIT (OUTPATIENT)
Dept: HEMATOLOGY/ONCOLOGY | Facility: CLINIC | Age: 65
End: 2025-07-15
Payer: COMMERCIAL

## 2025-07-15 ENCOUNTER — PATIENT MESSAGE (OUTPATIENT)
Dept: HEMATOLOGY/ONCOLOGY | Facility: CLINIC | Age: 65
End: 2025-07-15

## 2025-07-15 VITALS
BODY MASS INDEX: 27.39 KG/M2 | SYSTOLIC BLOOD PRESSURE: 124 MMHG | HEART RATE: 77 BPM | RESPIRATION RATE: 16 BRPM | HEIGHT: 68 IN | DIASTOLIC BLOOD PRESSURE: 66 MMHG | OXYGEN SATURATION: 96 % | WEIGHT: 180.75 LBS

## 2025-07-15 DIAGNOSIS — Z94.81 AUTOLOGOUS BONE MARROW TRANSPLANTATION STATUS: ICD-10-CM

## 2025-07-15 DIAGNOSIS — C90.01 MULTIPLE MYELOMA IN REMISSION: ICD-10-CM

## 2025-07-15 PROCEDURE — 1101F PT FALLS ASSESS-DOCD LE1/YR: CPT | Mod: CPTII,S$GLB,, | Performed by: INTERNAL MEDICINE

## 2025-07-15 PROCEDURE — 99999 PR PBB SHADOW E&M-EST. PATIENT-LVL III: CPT | Mod: PBBFAC,,, | Performed by: INTERNAL MEDICINE

## 2025-07-15 PROCEDURE — 1126F AMNT PAIN NOTED NONE PRSNT: CPT | Mod: CPTII,S$GLB,, | Performed by: INTERNAL MEDICINE

## 2025-07-15 PROCEDURE — 99214 OFFICE O/P EST MOD 30 MIN: CPT | Mod: S$GLB,,, | Performed by: INTERNAL MEDICINE

## 2025-07-15 PROCEDURE — 3074F SYST BP LT 130 MM HG: CPT | Mod: CPTII,S$GLB,, | Performed by: INTERNAL MEDICINE

## 2025-07-15 PROCEDURE — 3008F BODY MASS INDEX DOCD: CPT | Mod: CPTII,S$GLB,, | Performed by: INTERNAL MEDICINE

## 2025-07-15 PROCEDURE — 3288F FALL RISK ASSESSMENT DOCD: CPT | Mod: CPTII,S$GLB,, | Performed by: INTERNAL MEDICINE

## 2025-07-15 PROCEDURE — 3078F DIAST BP <80 MM HG: CPT | Mod: CPTII,S$GLB,, | Performed by: INTERNAL MEDICINE

## 2025-07-15 PROCEDURE — 1159F MED LIST DOCD IN RCRD: CPT | Mod: CPTII,S$GLB,, | Performed by: INTERNAL MEDICINE

## 2025-07-15 RX ORDER — FOLIC ACID-PYRIDOXINE-CYANOCOBALAMIN TAB 2.5-25-2 MG 2.5-25-2 MG
1 TAB ORAL DAILY
Qty: 90 TABLET | Refills: 11 | Status: SHIPPED | OUTPATIENT
Start: 2025-07-15

## 2025-07-15 NOTE — PROGRESS NOTES
Section of Hematology and Stem Cell Transplantation  Follow-Up Note     07/15/2025  Primary Oncologic Diagnosis: IgA multiple myeloma, s/p tandem AutoSCT in [place]    History of Present Ilness:   Vicente Connors (Vicente) is a pleasant 64 y.o.male from [place], [place], here for follow up of IgA kappa multiple myeloma, s/p tandem autoSCT, on VRd maintenance. Past medical history of peripheral neuropathy, history of LE DVT on chronic anticoagulation, GERD, vertebral fracture. Previously followed by Dr. Burt and Dr. Coronel.    Oncologic History:  Here for hematology follow up. He has recently moved to [place] from [place]. He has history of stage IIIA, [employer] stage II IgA kappa multiple myeloma, diagnosed in 2014, which is likely intermediate-risk based upon the presence of t(4;14) with hyperdiploidy, based on records available through care everywhere.   He was started on velcade, revlimid and dexamethasone at diagnosis.  Of note, he developed a popliteal DVT and was started on Lovenox and later switched Xarelto.   He completed a stem cell transplant with Dr Schuster in May 2015. He is s/p tandem autologous transplant in 2015 and has been on triple maintenance with bortezomib, lenalidomide and dexamethasone since then.  He has had multiple skeletal complications since his original diagnosis.  He had prolonged bout of diarrhea in January 2023. All stool studies were negative/ unremarkable. He had COVID 19 infection in March 2023. He received paxlovid.  MRI spine in March 2023 showed compression fracture of L1 with retropulsion of the posterior fragment as well as minimal compression fractures of the superior endplates of T8 and T11. MRI pelvis with facet arthropathy in the lower lumbar spine. Hip joints exhibiting bilateral chondral thinning with labral fraying and osteophyte production.     INTERVAL HISTORY 7/15/25    Vicente is seen here for follow up for IgA MM. He was recntly seen by Dr. Schuster  in [place] on 7/7/25. Recommended pursuing monthly UIFE, UPEP and continued Ig evaluation and SIFE. He is worried that he may be having an earlier sign of relapse. He denies any bone pain other than low back pain. No fever, chills, nausea and vomiting.    Interval History 04/02/2025:  Patient here for follow up, on VRd with monthly velcade. He reports everything is status quo with no bothersome symptoms. He has some recent colds with no fevers but feeling improved today. His chronic back pain is stable. Denies fever, chills, drenching night sweats, unexplained weight loss, early satiety, chest pain, shortness of breath, new/worsening bone pain, adenopathy, N/V/D.     INTERVAL 6/3/25  Mr. Connors returns to clinic for scheduled follow-up while on maintenance VRd. He reports he is about the same, though reports his back pain seems to be slightly worse lately. He is also concerned about the slight increase in kappa light chains compared to historical baseline. No constitutional/B-symptoms. He is scheduled to see Dr. Schuster in [place] next month. We discussed the possible role for de-escalation of maintenance therapy as previously documented by EZEQUIEL Shirley--he requests second opinion from Dr. Schuster.     Past Medical History, Social History, and Past Family History are unchanged since last evaluation except for HPI.    Past Medical History:   Diagnosis Date    Cancer     GERD (gastroesophageal reflux disease)     Mild hypertension 3/15/2021        CURRENT MEDICATIONS:   Current Outpatient Medications   Medication Sig    acyclovir (ZOVIRAX) 400 MG tablet Take 1 tablet (400 mg total) by mouth 2 (two) times daily.    bortezomib (VELCADE INJ) Inject as directed. Pt gets every month    calcium carb/vit D3/minerals (CALCIUM-VITAMIN D ORAL)     dexAMETHasone (DECADRON) 4 MG Tab TAKE 10 TABLETS (40 MG DOSE) BY MOUTH DAY OF AND DAY AFTER VELCADE once monthly    folic acid-vit B6-vit B12 2.5-25-2 mg (FOLBIC) 2.5-25-2 mg Tab  Take 1 tablet by mouth once daily.    hydroCHLOROthiazide (HYDRODIURIL) 25 MG tablet TAKE 1 TABLET(25 MG) BY MOUTH EVERY DAY    magnesium oxide 500 mg Tab once daily.    multivitamin with minerals (MULTI-VITAMIN W/MINERALS ORAL)     omeprazole (PRILOSEC) 20 MG capsule     REVLIMID 10 mg Cap Take 1 capsule (10 mg total) by mouth Daily Take 1 capsule 21 days on and 7 days off.    rivaroxaban (XARELTO) 15 mg Tab Take 1 tablet (15 mg total) by mouth once daily.    zoledronic acid (ZOMETA IV) Inject into the vein. Pt gets every 3 months    folic acid-vit B6-vit B12 2.5-25-2 mg (FOLBIC) 2.5-25-2 mg Tab Take 1 tablet by mouth once daily.     No current facility-administered medications for this visit.       ALLERGIES:   Review of patient's allergies indicates:  No Known Allergies    Review of Systems:   A comprehensive review of systems was performed; pertinent positives and negatives are noted in the HPI.        Physical Exam:     Vitals:    07/15/25 1017   BP: 124/66   Pulse: 77   Resp: 16       Physical Exam  Vitals and nursing note reviewed.   Constitutional:       General: He is not in acute distress.     Appearance: Normal appearance. He is normal weight. He is not toxic-appearing.   HENT:      Head: Normocephalic and atraumatic.   Eyes:      General: No scleral icterus.     Conjunctiva/sclera: Conjunctivae normal.   Pulmonary:      Effort: Pulmonary effort is normal. No respiratory distress.   Musculoskeletal:         General: No deformity.   Skin:     Coloration: Skin is not jaundiced.      Findings: No erythema.   Neurological:      General: No focal deficit present.      Mental Status: He is alert and oriented to person, place, and time. Mental status is at baseline.   Psychiatric:         Mood and Affect: Mood normal.         Behavior: Behavior normal.         Thought Content: Thought content normal.          ECOG Performance Status: (foot note - ECOG PS provided by Eastern Co[employer] Oncology Group) 1 -  Symptomatic but completely ambulatory    Karnofsky Performance Score:  90%- Able to Carry on Normal Activity: Minor Symptoms of Disease      Labs:   Lab Results   Component Value Date    WBC 3.62 (L) 06/26/2025    HGB 12.1 (L) 06/26/2025    HCT 36.6 (L) 06/26/2025    MCV 90 06/26/2025     06/26/2025       Lab Results   Component Value Date     06/26/2025    K 3.4 (L) 06/26/2025     06/26/2025    CO2 24 06/26/2025    BUN 16 06/26/2025    CREATININE 1.0 06/26/2025    ALBUMIN 2.9 (L) 06/26/2025    BILITOT 0.8 06/26/2025    ALKPHOS 63 06/26/2025    AST 21 06/26/2025    ALT 22 06/26/2025       Imaging:   None     Pathology:  None       Assessment and Plan:     Problem List Items Addressed This Visit      Multiple myeloma in remission  - Stage IIIA, ISS II IgA kappa multiple myeloma, which is likely intermediate-risk based upon the presence of t(4;14) with hyperdiploidy   - Now on triple maintenance: monthly velcade 1.3 mg/m2 sub q, dex 40 mg PO day of and day after monthly velcade, revlimid 10 mg PO for 21 days on / 7 days off every 28 days  - Systemic restaging completed to reassess MRD status for possible therapy deescalation in light of almost 10 years since SCT  - 11/27/24 marrow shows 10e-5 MRD negative sCR; he expressed hesitancy over stopping maintenance  - Per Dr. Schuster (transplant oncologist) - will add 24 hr urine for total protein, UPEP and UIFE. Quant Ig monitoring and SIFE and trend for the next 3 months- which is reasonable.   - Plan to continue maintenance as above for now.  - Discussed risk associated with prolonged revlimid use including but not limited to MDS and treatment related AML.  - Continue zometa q3 months, previously for jaw pain, but cleared by dentist after extensive evaluation including MRI. Next, July 2025.    Worsening back pain  -Repeat MRI C/T/L spine and MRI pelvis which showed L1 compression fracture  - Per patient,he has had this since MM diagnosis    History of  auto stem cell transplant  - S/p tandem autoSCT in 2015    Other thrombophilia  - History of DVT, increased risk of clots with ongoing revlimid  - Continue xarelto 15 mg daily to prevent VTE    Immunodeficiency due to drugs  - Secondary to maintenance chemotherapy, continue acyclovir two times daily for shingles prophylaxis  - Post-transplant vaccines complete    Cancer associated pain  - History of vertebral compression fracture  - Chronic, stable, unchanged, not on analgesic at this time    Anemia in neoplastic disease  - Secondary to chemotherapy, mild, stable, asymptomatic    Leukopenia due to antineoplastic chemotherapy  - Secondary to chemotherapy  - Stable, recent cold, no fever, ANC normal    Healthcare maintenance  - Colonoscopy 2011 and 2017, repeat 2022   - PSA  up to date   - Covid vaccine 3/31 pfizer booster done, second booster 2/17/22  - Recommend COVID booster      BMT Chart Routing      Follow up with physician 3 months. Dr. Sabillon   Follow up with [facility]    Provider visit type Malignant hem   Infusion scheduling note    Injection scheduling note    Labs CMP, CBC, UPEP, SPEP, immunoglobulins and immunofixation   Scheduling:  Preferred lab:  Lab interval: every 4 weeks  Do CBC, CMP, SPEP, SIFE, 24hr - UPEP, UIFE, Quant Ig, every 4 weeks   Imaging    Pharmacy appointment    Other referrals                      Orders Placed:      Orders Placed This Encounter    Immunofixation, 24 hour Urine    Protein electrophoresis, timed urine    folic acid-vit B6-vit B12 2.5-25-2 mg (FOLBIC) 2.5-25-2 mg Tab         James Franco MD  Hematology & Oncology Fellow, PGY-VI  [facility]           High Risk Conditions:    Patient has a condition that poses threat to life and bodily function: MM  Patient is currently on drug therapy requiring intensive monitoring for toxicity: VRd

## 2025-07-24 ENCOUNTER — LAB VISIT (OUTPATIENT)
Dept: LAB | Facility: OTHER | Age: 65
End: 2025-07-24
Attending: INTERNAL MEDICINE
Payer: COMMERCIAL

## 2025-07-24 DIAGNOSIS — C90.01 MULTIPLE MYELOMA IN REMISSION: ICD-10-CM

## 2025-07-24 DIAGNOSIS — Z94.81 AUTOLOGOUS BONE MARROW TRANSPLANTATION STATUS: ICD-10-CM

## 2025-07-24 LAB
ABSOLUTE EOSINOPHIL (OHS): 0.1 K/UL
ABSOLUTE MONOCYTE (OHS): 0.41 K/UL (ref 0.3–1)
ABSOLUTE NEUTROPHIL COUNT (OHS): 1.11 K/UL (ref 1.8–7.7)
ALBUMIN SERPL BCP-MCNC: 3.3 G/DL (ref 3.5–5.2)
ALP SERPL-CCNC: 56 UNIT/L (ref 40–150)
ALT SERPL W/O P-5'-P-CCNC: 20 UNIT/L (ref 10–44)
ANION GAP (OHS): 8 MMOL/L (ref 8–16)
AST SERPL-CCNC: 38 UNIT/L (ref 11–45)
BASOPHILS # BLD AUTO: 0.05 K/UL
BASOPHILS NFR BLD AUTO: 1.5 %
BILIRUB SERPL-MCNC: 1.1 MG/DL (ref 0.1–1)
BUN SERPL-MCNC: 12 MG/DL (ref 8–23)
CALCIUM SERPL-MCNC: 8.8 MG/DL (ref 8.7–10.5)
CHLORIDE SERPL-SCNC: 106 MMOL/L (ref 95–110)
CO2 SERPL-SCNC: 26 MMOL/L (ref 23–29)
CREAT SERPL-MCNC: 0.9 MG/DL (ref 0.5–1.4)
ERYTHROCYTE [DISTWIDTH] IN BLOOD BY AUTOMATED COUNT: 16.9 % (ref 11.5–14.5)
GFR SERPLBLD CREATININE-BSD FMLA CKD-EPI: >60 ML/MIN/1.73/M2
GLUCOSE SERPL-MCNC: 102 MG/DL (ref 70–110)
HCT VFR BLD AUTO: 37.4 % (ref 40–54)
HGB BLD-MCNC: 12.6 GM/DL (ref 14–18)
IGA SERPL-MCNC: 292 MG/DL (ref 40–350)
IGG SERPL-MCNC: 774 MG/DL (ref 650–1600)
IGM SERPL-MCNC: 13 MG/DL (ref 50–300)
IMM GRANULOCYTES # BLD AUTO: 0.01 K/UL (ref 0–0.04)
IMM GRANULOCYTES NFR BLD AUTO: 0.3 % (ref 0–0.5)
LYMPHOCYTES # BLD AUTO: 1.62 K/UL (ref 1–4.8)
MCH RBC QN AUTO: 30.4 PG (ref 27–31)
MCHC RBC AUTO-ENTMCNC: 33.7 G/DL (ref 32–36)
MCV RBC AUTO: 90 FL (ref 82–98)
NUCLEATED RBC (/100WBC) (OHS): 0 /100 WBC
PLATELET # BLD AUTO: 181 K/UL (ref 150–450)
PMV BLD AUTO: 9.7 FL (ref 9.2–12.9)
POTASSIUM SERPL-SCNC: 3.3 MMOL/L (ref 3.5–5.1)
PROT SERPL-MCNC: 6.4 GM/DL (ref 6–8.4)
RBC # BLD AUTO: 4.14 M/UL (ref 4.6–6.2)
RELATIVE EOSINOPHIL (OHS): 3 %
RELATIVE LYMPHOCYTE (OHS): 49.1 % (ref 18–48)
RELATIVE MONOCYTE (OHS): 12.4 % (ref 4–15)
RELATIVE NEUTROPHIL (OHS): 33.7 % (ref 38–73)
SODIUM SERPL-SCNC: 140 MMOL/L (ref 136–145)
WBC # BLD AUTO: 3.3 K/UL (ref 3.9–12.7)

## 2025-07-24 PROCEDURE — 36415 COLL VENOUS BLD VENIPUNCTURE: CPT

## 2025-07-24 PROCEDURE — 82784 ASSAY IGA/IGD/IGG/IGM EACH: CPT

## 2025-07-24 PROCEDURE — 80053 COMPREHEN METABOLIC PANEL: CPT

## 2025-07-24 PROCEDURE — 85025 COMPLETE CBC W/AUTO DIFF WBC: CPT

## 2025-07-24 PROCEDURE — 83521 IG LIGHT CHAINS FREE EACH: CPT

## 2025-07-24 PROCEDURE — 86334 IMMUNOFIX E-PHORESIS SERUM: CPT

## 2025-07-25 ENCOUNTER — LAB VISIT (OUTPATIENT)
Dept: LAB | Facility: OTHER | Age: 65
End: 2025-07-25
Attending: INTERNAL MEDICINE
Payer: COMMERCIAL

## 2025-07-25 ENCOUNTER — INFUSION (OUTPATIENT)
Dept: INFUSION THERAPY | Facility: OTHER | Age: 65
End: 2025-07-25
Attending: INTERNAL MEDICINE
Payer: COMMERCIAL

## 2025-07-25 VITALS
WEIGHT: 182.31 LBS | OXYGEN SATURATION: 96 % | TEMPERATURE: 98 F | HEART RATE: 81 BPM | BODY MASS INDEX: 27.72 KG/M2 | DIASTOLIC BLOOD PRESSURE: 77 MMHG | RESPIRATION RATE: 16 BRPM | SYSTOLIC BLOOD PRESSURE: 136 MMHG

## 2025-07-25 DIAGNOSIS — C90.01 MULTIPLE MYELOMA IN REMISSION: ICD-10-CM

## 2025-07-25 DIAGNOSIS — C90.01 MULTIPLE MYELOMA IN REMISSION: Primary | ICD-10-CM

## 2025-07-25 DIAGNOSIS — E87.6 HYPOKALEMIA: Primary | ICD-10-CM

## 2025-07-25 DIAGNOSIS — Z94.81 AUTOLOGOUS BONE MARROW TRANSPLANTATION STATUS: ICD-10-CM

## 2025-07-25 LAB
KAPPA LC FREE SER-MCNC: 1.38 MG/L (ref 0.26–1.65)
KAPPA LC FREE/LAMBDA FREE SER: 2.12 MG/DL (ref 0.33–1.94)
LAMBDA LC FREE SERPL-MCNC: 1.54 MG/DL (ref 0.57–2.63)

## 2025-07-25 PROCEDURE — 84166 PROTEIN E-PHORESIS/URINE/CSF: CPT

## 2025-07-25 PROCEDURE — 96374 THER/PROPH/DIAG INJ IV PUSH: CPT

## 2025-07-25 PROCEDURE — 86335 IMMUNFIX E-PHORSIS/URINE/CSF: CPT

## 2025-07-25 PROCEDURE — 63600175 PHARM REV CODE 636 W HCPCS

## 2025-07-25 PROCEDURE — 96401 CHEMO ANTI-NEOPL SQ/IM: CPT

## 2025-07-25 RX ORDER — SODIUM CHLORIDE 0.9 % (FLUSH) 0.9 %
10 SYRINGE (ML) INJECTION
Status: DISCONTINUED | OUTPATIENT
Start: 2025-07-25 | End: 2025-07-25 | Stop reason: HOSPADM

## 2025-07-25 RX ORDER — SODIUM CHLORIDE 0.9 % (FLUSH) 0.9 %
10 SYRINGE (ML) INJECTION
Status: CANCELLED | OUTPATIENT
Start: 2025-07-25

## 2025-07-25 RX ORDER — BORTEZOMIB 3.5 MG/1
1.3 INJECTION, POWDER, LYOPHILIZED, FOR SOLUTION INTRAVENOUS; SUBCUTANEOUS
Status: COMPLETED | OUTPATIENT
Start: 2025-07-25 | End: 2025-07-25

## 2025-07-25 RX ORDER — POTASSIUM CHLORIDE 20 MEQ/1
20 TABLET, EXTENDED RELEASE ORAL DAILY
Qty: 5 TABLET | Refills: 0 | Status: SHIPPED | OUTPATIENT
Start: 2025-07-25

## 2025-07-25 RX ORDER — BORTEZOMIB 3.5 MG/1
1.3 INJECTION, POWDER, LYOPHILIZED, FOR SOLUTION INTRAVENOUS; SUBCUTANEOUS
Status: CANCELLED | OUTPATIENT
Start: 2025-07-25

## 2025-07-25 RX ORDER — HEPARIN 100 UNIT/ML
500 SYRINGE INTRAVENOUS
Status: DISCONTINUED | OUTPATIENT
Start: 2025-07-25 | End: 2025-07-25 | Stop reason: HOSPADM

## 2025-07-25 RX ORDER — ZOLEDRONIC ACID 0.04 MG/ML
4 INJECTION, SOLUTION INTRAVENOUS
Status: COMPLETED | OUTPATIENT
Start: 2025-07-25 | End: 2025-07-25

## 2025-07-25 RX ORDER — HEPARIN 100 UNIT/ML
500 SYRINGE INTRAVENOUS
Status: CANCELLED | OUTPATIENT
Start: 2025-07-25

## 2025-07-25 RX ADMIN — BORTEZOMIB 2.5 MG: 3.5 INJECTION, POWDER, LYOPHILIZED, FOR SOLUTION INTRAVENOUS; SUBCUTANEOUS at 09:07

## 2025-07-25 RX ADMIN — ZOLEDRONIC ACID 4 MG: 0.04 INJECTION, SOLUTION INTRAVENOUS at 09:07

## 2025-07-25 NOTE — PLAN OF CARE
Problem: Adult Inpatient Plan of Care  Goal: Plan of Care Review  Outcome: Progressing  Goal: Patient-Specific Goal (Individualized)  Outcome: Progressing     Problem: Chemotherapy Effects  Goal: Safety Maintained  Outcome: Progressing  Goal: Absence of Infection  Outcome: Progressing       Patient presented today for zometa infusion and velcade injection. PIV started and infusion and injection given with no issues. VS remained stable throughout visit and assessment provided no issues. PIV removed w/o complications and all questions answered. Patient scheduled for next appt and left clinic in no acute distress.

## 2025-07-27 ENCOUNTER — PATIENT MESSAGE (OUTPATIENT)
Dept: HEMATOLOGY/ONCOLOGY | Facility: CLINIC | Age: 65
End: 2025-07-27
Payer: COMMERCIAL

## 2025-07-27 LAB — PATHOLOGIST INTERPRETATION - IFE SERUM (OHS): NORMAL

## 2025-07-28 ENCOUNTER — PATIENT MESSAGE (OUTPATIENT)
Dept: INTERNAL MEDICINE | Facility: CLINIC | Age: 65
End: 2025-07-28
Payer: COMMERCIAL

## 2025-07-28 DIAGNOSIS — Z86.69 HISTORY OF CATARACT: Primary | ICD-10-CM

## 2025-07-28 LAB
PROT 24H UR-MRATE: NORMAL G/(24.H)
PROT UR-MCNC: <7 MG/DL
TOTAL HOURS OF COLLECTION (OHS): 24 HR
TOTAL VOLUME  (OHS): 2800 ML

## 2025-07-28 NOTE — ADDENDUM NOTE
Addended by: ODESSA CAUSEY on: 7/28/2025 01:09 PM     Modules accepted: Orders     Cephalexin Counseling: I counseled the patient regarding use of cephalexin as an antibiotic for prophylactic and/or therapeutic purposes. Cephalexin (commonly prescribed under brand name Keflex) is a cephalosporin antibiotic which is active against numerous classes of bacteria, including most skin bacteria. Side effects may include nausea, diarrhea, gastrointestinal upset, rash, hives, yeast infections, and in rare cases, hepatitis, kidney disease, seizures, fever, confusion, neurologic symptoms, and others. Patients with severe allergies to penicillin medications are cautioned that there is about a 10% incidence of cross-reactivity with cephalosporins. When possible, patients with penicillin allergies should use alternatives to cephalosporins for antibiotic therapy.

## 2025-07-28 NOTE — TELEPHONE ENCOUNTER
Pt requesting a referral and a recommendation on an ophthalmologist at [facility] (preferred) or main campus for annual eye exam and a hx of catact sx. Pended referral.     LOV with Norris Pantoja MD , 10/21/2024

## 2025-07-28 NOTE — TELEPHONE ENCOUNTER
Hi, please contact the patient to assist in scheduling    Orders Placed This Encounter    Ambulatory referral/consult to Ophthalmology   Here are some options:  Nick Faustin, [place]      Thank you, Norris Pantoja

## 2025-07-30 LAB
PATHOLOGIST INTERPRETATION - IFE URINE (OHS): NORMAL
PATHOLOGIST INTERPRETATION - UPE (OHS): NORMAL

## 2025-08-01 DIAGNOSIS — C90.01 MULTIPLE MYELOMA IN REMISSION: ICD-10-CM

## 2025-08-01 RX ORDER — LENALIDOMIDE 10 MG/1
10 CAPSULE ORAL DAILY
Qty: 21 EACH | Refills: 0 | Status: SHIPPED | OUTPATIENT
Start: 2025-08-01

## 2025-08-10 ENCOUNTER — PATIENT MESSAGE (OUTPATIENT)
Dept: HEMATOLOGY/ONCOLOGY | Facility: CLINIC | Age: 65
End: 2025-08-10
Payer: COMMERCIAL

## 2025-08-11 ENCOUNTER — PATIENT MESSAGE (OUTPATIENT)
Dept: HEMATOLOGY/ONCOLOGY | Facility: CLINIC | Age: 65
End: 2025-08-11
Payer: COMMERCIAL

## 2025-08-11 DIAGNOSIS — Z94.81 AUTOLOGOUS BONE MARROW TRANSPLANTATION STATUS: ICD-10-CM

## 2025-08-11 DIAGNOSIS — C90.01 MULTIPLE MYELOMA IN REMISSION: ICD-10-CM

## 2025-08-11 RX ORDER — ACYCLOVIR 400 MG/1
400 TABLET ORAL 2 TIMES DAILY
Qty: 180 TABLET | Refills: 4 | Status: SHIPPED | OUTPATIENT
Start: 2025-08-11

## 2025-08-11 RX ORDER — DEXAMETHASONE 4 MG/1
TABLET ORAL
Qty: 80 TABLET | Refills: 11 | Status: SHIPPED | OUTPATIENT
Start: 2025-08-11

## 2025-08-13 ENCOUNTER — PATIENT MESSAGE (OUTPATIENT)
Dept: HEMATOLOGY/ONCOLOGY | Facility: CLINIC | Age: 65
End: 2025-08-13
Payer: COMMERCIAL

## 2025-08-13 DIAGNOSIS — C90.01 MULTIPLE MYELOMA IN REMISSION: Primary | ICD-10-CM

## 2025-08-15 ENCOUNTER — CLINICAL SUPPORT (OUTPATIENT)
Dept: REHABILITATION | Facility: OTHER | Age: 65
End: 2025-08-15
Attending: INTERNAL MEDICINE
Payer: COMMERCIAL

## 2025-08-15 DIAGNOSIS — M53.86 DECREASED ROM OF LUMBAR SPINE: Primary | ICD-10-CM

## 2025-08-15 PROCEDURE — 97161 PT EVAL LOW COMPLEX 20 MIN: CPT | Mod: PN

## 2025-08-18 PROBLEM — M53.86 DECREASED ROM OF LUMBAR SPINE: Status: ACTIVE | Noted: 2025-08-18

## 2025-08-20 ENCOUNTER — PATIENT MESSAGE (OUTPATIENT)
Dept: HEMATOLOGY/ONCOLOGY | Facility: CLINIC | Age: 65
End: 2025-08-20
Payer: COMMERCIAL

## 2025-08-21 ENCOUNTER — LAB VISIT (OUTPATIENT)
Dept: LAB | Facility: OTHER | Age: 65
End: 2025-08-21
Payer: COMMERCIAL

## 2025-08-21 ENCOUNTER — PATIENT MESSAGE (OUTPATIENT)
Dept: HEMATOLOGY/ONCOLOGY | Facility: CLINIC | Age: 65
End: 2025-08-21
Payer: COMMERCIAL

## 2025-08-21 DIAGNOSIS — E87.6 HYPOKALEMIA: ICD-10-CM

## 2025-08-21 DIAGNOSIS — C90.01 MULTIPLE MYELOMA IN REMISSION: ICD-10-CM

## 2025-08-21 DIAGNOSIS — Z94.81 AUTOLOGOUS BONE MARROW TRANSPLANTATION STATUS: ICD-10-CM

## 2025-08-21 LAB
ABSOLUTE EOSINOPHIL (OHS): 0.06 K/UL
ABSOLUTE MONOCYTE (OHS): 0.5 K/UL (ref 0.3–1)
ABSOLUTE NEUTROPHIL COUNT (OHS): 1.23 K/UL (ref 1.8–7.7)
ALBUMIN SERPL BCP-MCNC: 3.5 G/DL (ref 3.5–5.2)
ALP SERPL-CCNC: 59 UNIT/L (ref 40–150)
ALT SERPL W/O P-5'-P-CCNC: 29 UNIT/L (ref 10–44)
ANION GAP (OHS): 11 MMOL/L (ref 8–16)
AST SERPL-CCNC: 35 UNIT/L (ref 11–45)
BASOPHILS # BLD AUTO: 0.02 K/UL
BASOPHILS NFR BLD AUTO: 0.5 %
BILIRUB SERPL-MCNC: 1.5 MG/DL (ref 0.1–1)
BUN SERPL-MCNC: 15 MG/DL (ref 8–23)
CALCIUM SERPL-MCNC: 9 MG/DL (ref 8.7–10.5)
CHLORIDE SERPL-SCNC: 103 MMOL/L (ref 95–110)
CO2 SERPL-SCNC: 25 MMOL/L (ref 23–29)
CREAT SERPL-MCNC: 0.9 MG/DL (ref 0.5–1.4)
ERYTHROCYTE [DISTWIDTH] IN BLOOD BY AUTOMATED COUNT: 16.7 % (ref 11.5–14.5)
GFR SERPLBLD CREATININE-BSD FMLA CKD-EPI: >60 ML/MIN/1.73/M2
GLUCOSE SERPL-MCNC: 111 MG/DL (ref 70–110)
HCT VFR BLD AUTO: 38.1 % (ref 40–54)
HGB BLD-MCNC: 12.8 GM/DL (ref 14–18)
IGA SERPL-MCNC: 249 MG/DL (ref 40–350)
IGG SERPL-MCNC: 769 MG/DL (ref 650–1600)
IGM SERPL-MCNC: 13 MG/DL (ref 50–300)
IMM GRANULOCYTES # BLD AUTO: 0 K/UL (ref 0–0.04)
IMM GRANULOCYTES NFR BLD AUTO: 0 % (ref 0–0.5)
LYMPHOCYTES # BLD AUTO: 1.85 K/UL (ref 1–4.8)
MCH RBC QN AUTO: 30.3 PG (ref 27–31)
MCHC RBC AUTO-ENTMCNC: 33.6 G/DL (ref 32–36)
MCV RBC AUTO: 90 FL (ref 82–98)
NUCLEATED RBC (/100WBC) (OHS): 0 /100 WBC
PLATELET # BLD AUTO: 180 K/UL (ref 150–450)
PMV BLD AUTO: 9.5 FL (ref 9.2–12.9)
POTASSIUM SERPL-SCNC: 3.2 MMOL/L (ref 3.5–5.1)
PROT SERPL-MCNC: 6.5 GM/DL (ref 6–8.4)
RBC # BLD AUTO: 4.22 M/UL (ref 4.6–6.2)
RELATIVE EOSINOPHIL (OHS): 1.6 %
RELATIVE LYMPHOCYTE (OHS): 50.5 % (ref 18–48)
RELATIVE MONOCYTE (OHS): 13.7 % (ref 4–15)
RELATIVE NEUTROPHIL (OHS): 33.7 % (ref 38–73)
SODIUM SERPL-SCNC: 139 MMOL/L (ref 136–145)
WBC # BLD AUTO: 3.66 K/UL (ref 3.9–12.7)

## 2025-08-21 PROCEDURE — 84166 PROTEIN E-PHORESIS/URINE/CSF: CPT

## 2025-08-21 PROCEDURE — 86335 IMMUNFIX E-PHORSIS/URINE/CSF: CPT

## 2025-08-21 PROCEDURE — 84165 PROTEIN E-PHORESIS SERUM: CPT

## 2025-08-21 PROCEDURE — 85025 COMPLETE CBC W/AUTO DIFF WBC: CPT

## 2025-08-21 PROCEDURE — 82784 ASSAY IGA/IGD/IGG/IGM EACH: CPT | Mod: 59

## 2025-08-21 PROCEDURE — 82247 BILIRUBIN TOTAL: CPT

## 2025-08-21 PROCEDURE — 36415 COLL VENOUS BLD VENIPUNCTURE: CPT

## 2025-08-21 PROCEDURE — 83521 IG LIGHT CHAINS FREE EACH: CPT

## 2025-08-21 PROCEDURE — 86334 IMMUNOFIX E-PHORESIS SERUM: CPT

## 2025-08-21 RX ORDER — SODIUM CHLORIDE 0.9 % (FLUSH) 0.9 %
10 SYRINGE (ML) INJECTION
Status: CANCELLED | OUTPATIENT
Start: 2025-08-21

## 2025-08-21 RX ORDER — POTASSIUM CHLORIDE 20 MEQ/1
20 TABLET, EXTENDED RELEASE ORAL DAILY
Qty: 30 TABLET | Refills: 2 | Status: SHIPPED | OUTPATIENT
Start: 2025-08-21

## 2025-08-21 RX ORDER — HEPARIN 100 UNIT/ML
500 SYRINGE INTRAVENOUS
Status: CANCELLED | OUTPATIENT
Start: 2025-08-21

## 2025-08-21 RX ORDER — BORTEZOMIB 3.5 MG/1
1.3 INJECTION, POWDER, LYOPHILIZED, FOR SOLUTION INTRAVENOUS; SUBCUTANEOUS
Status: CANCELLED | OUTPATIENT
Start: 2025-08-21 | End: 2025-08-21

## 2025-08-22 ENCOUNTER — INFUSION (OUTPATIENT)
Dept: INFUSION THERAPY | Facility: OTHER | Age: 65
End: 2025-08-22
Attending: INTERNAL MEDICINE
Payer: COMMERCIAL

## 2025-08-22 VITALS — BODY MASS INDEX: 27.96 KG/M2 | WEIGHT: 183.88 LBS

## 2025-08-22 DIAGNOSIS — C90.01 MULTIPLE MYELOMA IN REMISSION: Primary | ICD-10-CM

## 2025-08-22 LAB
KAPPA LC FREE SER-MCNC: 1.43 MG/L (ref 0.26–1.65)
KAPPA LC FREE/LAMBDA FREE SER: 1.93 MG/DL (ref 0.33–1.94)
LAMBDA LC FREE SERPL-MCNC: 1.35 MG/DL (ref 0.57–2.63)
PATHOLOGIST INTERPRETATION - IFE SERUM (OHS): NORMAL
PATHOLOGIST INTERPRETATION - IFE URINE (OHS): NORMAL

## 2025-08-22 PROCEDURE — 96401 CHEMO ANTI-NEOPL SQ/IM: CPT

## 2025-08-22 PROCEDURE — 63600175 PHARM REV CODE 636 W HCPCS: Performed by: INTERNAL MEDICINE

## 2025-08-22 RX ORDER — SODIUM CHLORIDE 0.9 % (FLUSH) 0.9 %
10 SYRINGE (ML) INJECTION
Status: DISCONTINUED | OUTPATIENT
Start: 2025-08-22 | End: 2025-08-22 | Stop reason: HOSPADM

## 2025-08-22 RX ORDER — BORTEZOMIB 3.5 MG/1
1.3 INJECTION, POWDER, LYOPHILIZED, FOR SOLUTION INTRAVENOUS; SUBCUTANEOUS
Status: COMPLETED | OUTPATIENT
Start: 2025-08-22 | End: 2025-08-22

## 2025-08-22 RX ORDER — HEPARIN 100 UNIT/ML
500 SYRINGE INTRAVENOUS
Status: DISCONTINUED | OUTPATIENT
Start: 2025-08-22 | End: 2025-08-22 | Stop reason: HOSPADM

## 2025-08-22 RX ADMIN — BORTEXOMIB 2.5 MG: 3.5 INJECTION, POWDER, LYOPHILIZED, FOR SOLUTION INTRAVENOUS; SUBCUTANEOUS at 09:08

## 2025-08-25 LAB
ALBUMIN, SPE (OHS): 3.66 G/DL (ref 3.35–5.55)
ALPHA 1 GLOB (OHS): 0.28 GM/DL (ref 0.17–0.41)
ALPHA 2 GLOB (OHS): 0.67 GM/DL (ref 0.43–0.99)
BETA GLOB (OHS): 0.91 GM/DL (ref 0.5–1.1)
GAMMA GLOBULIN (OHS): 0.77 GM/DL (ref 0.67–1.58)
PATHOLOGIST INTERPRETATION - UPE (OHS): NORMAL
PATHOLOGIST REVIEW - SPE (OHS): NORMAL
PROT 24H UR-MRATE: NORMAL G/(24.H)
PROT SERPL-MCNC: 6.3 GM/DL (ref 6–8.4)
PROT UR-MCNC: <7 MG/DL
TOTAL HOURS OF COLLECTION (OHS): 24 HR
TOTAL VOLUME  (OHS): 2590 ML

## 2025-08-27 DIAGNOSIS — C90.01 MULTIPLE MYELOMA IN REMISSION: ICD-10-CM

## 2025-08-27 RX ORDER — LENALIDOMIDE 10 MG/1
10 CAPSULE ORAL DAILY
Qty: 21 EACH | Refills: 0 | Status: SHIPPED | OUTPATIENT
Start: 2025-08-27

## 2025-08-28 ENCOUNTER — CLINICAL SUPPORT (OUTPATIENT)
Dept: REHABILITATION | Facility: OTHER | Age: 65
End: 2025-08-28
Payer: COMMERCIAL

## 2025-08-28 DIAGNOSIS — M53.86 DECREASED ROM OF LUMBAR SPINE: Primary | ICD-10-CM

## 2025-08-28 PROCEDURE — 97112 NEUROMUSCULAR REEDUCATION: CPT | Mod: PN,CQ

## 2025-08-28 PROCEDURE — 97530 THERAPEUTIC ACTIVITIES: CPT | Mod: PN,CQ
